# Patient Record
Sex: FEMALE | Race: OTHER | Employment: FULL TIME | URBAN - METROPOLITAN AREA
[De-identification: names, ages, dates, MRNs, and addresses within clinical notes are randomized per-mention and may not be internally consistent; named-entity substitution may affect disease eponyms.]

---

## 2017-07-11 ENCOUNTER — OFFICE VISIT (OUTPATIENT)
Dept: FAMILY MEDICINE CLINIC | Age: 37
End: 2017-07-11

## 2017-07-11 VITALS
DIASTOLIC BLOOD PRESSURE: 70 MMHG | TEMPERATURE: 98.3 F | BODY MASS INDEX: 35.33 KG/M2 | HEIGHT: 62 IN | SYSTOLIC BLOOD PRESSURE: 108 MMHG | WEIGHT: 192 LBS | HEART RATE: 76 BPM | RESPIRATION RATE: 18 BRPM

## 2017-07-11 DIAGNOSIS — Z86.69 HISTORY OF MIGRAINE HEADACHES: ICD-10-CM

## 2017-07-11 DIAGNOSIS — Z13.9 SCREENING: ICD-10-CM

## 2017-07-11 DIAGNOSIS — E03.9 HYPOTHYROIDISM, UNSPECIFIED TYPE: Primary | ICD-10-CM

## 2017-07-11 PROCEDURE — 99203 OFFICE O/P NEW LOW 30 MIN: CPT | Performed by: NURSE PRACTITIONER

## 2017-07-11 RX ORDER — LEVOTHYROXINE SODIUM 0.12 MG/1
125 TABLET ORAL DAILY
COMMUNITY
End: 2017-07-11 | Stop reason: SDUPTHER

## 2017-07-11 RX ORDER — LEVOTHYROXINE SODIUM 0.12 MG/1
125 TABLET ORAL DAILY
Qty: 90 TABLET | Refills: 3 | Status: SHIPPED | OUTPATIENT
Start: 2017-07-11 | End: 2018-02-05 | Stop reason: SDUPTHER

## 2017-07-11 RX ORDER — VERAPAMIL HYDROCHLORIDE 120 MG/1
120 CAPSULE, EXTENDED RELEASE ORAL NIGHTLY
COMMUNITY
End: 2019-02-18 | Stop reason: SDUPTHER

## 2017-07-11 RX ORDER — SERTRALINE HYDROCHLORIDE 100 MG/1
100 TABLET, FILM COATED ORAL DAILY
COMMUNITY
End: 2017-10-23 | Stop reason: SDUPTHER

## 2017-07-11 ASSESSMENT — PATIENT HEALTH QUESTIONNAIRE - PHQ9
SUM OF ALL RESPONSES TO PHQ QUESTIONS 1-9: 0
2. FEELING DOWN, DEPRESSED OR HOPELESS: 0
1. LITTLE INTEREST OR PLEASURE IN DOING THINGS: 0
SUM OF ALL RESPONSES TO PHQ9 QUESTIONS 1 & 2: 0

## 2017-09-02 LAB
ALBUMIN SERPL-MCNC: 4 G/DL (ref 3.2–5.3)
ALK PHOSPHATASE: 24 IU/L (ref 35–121)
ALT SERPL-CCNC: 12 IU/L (ref 5–59)
ANION GAP SERPL CALCULATED.3IONS-SCNC: 17 MMOL/L
AST SERPL-CCNC: 20 IU/L (ref 10–42)
BILIRUB SERPL-MCNC: 0.4 MG/DL (ref 0.2–1.3)
BUN BLDV-MCNC: 13 MG/DL (ref 10–20)
CALCIUM SERPL-MCNC: 9.1 MG/DL (ref 8.7–10.8)
CHLORIDE BLD-SCNC: 101 MMOL/L (ref 95–111)
CHOLESTEROL/HDL RATIO: 3.6
CHOLESTEROL: 178 MG/DL
CO2: 27 MMOL/L (ref 21–32)
CREAT SERPL-MCNC: 0.8 MG/DL (ref 0.5–1.3)
EGFR AFRICAN AMERICAN: 98
EGFR IF NONAFRICAN AMERICAN: 81
GLUCOSE: 77 MG/DL (ref 70–100)
HDLC SERPL-MCNC: 50 MG/DL (ref 40–60)
LDL CHOLESTEROL CALCULATED: 84 MG/DL
LDL/HDL RATIO: 1.7
POTASSIUM SERPL-SCNC: 4 MMOL/L (ref 3.5–5.4)
SODIUM BLD-SCNC: 141 MMOL/L (ref 134–147)
T4 FREE: 1.07 NG/DL (ref 0.8–1.8)
TOTAL PROTEIN: 7 G/DL (ref 5.8–8)
TRIGL SERPL-MCNC: 221 MG/DL
TSH SERPL DL<=0.05 MIU/L-ACNC: 1.07 UIU/ML (ref 0.4–4.4)
VLDLC SERPL CALC-MCNC: 44 MG/DL

## 2017-09-06 LAB — THYROID PEROXIDASE ANTIBODY: 1 IU/ML

## 2017-09-07 ENCOUNTER — TELEPHONE (OUTPATIENT)
Dept: FAMILY MEDICINE CLINIC | Age: 37
End: 2017-09-07

## 2017-10-23 ENCOUNTER — HOSPITAL ENCOUNTER (OUTPATIENT)
Dept: GENERAL RADIOLOGY | Age: 37
Discharge: HOME | End: 2017-10-23
Payer: COMMERCIAL

## 2017-10-23 ENCOUNTER — HOSPITAL ENCOUNTER (OUTPATIENT)
Age: 37
Discharge: HOME | End: 2017-10-23
Payer: COMMERCIAL

## 2017-10-23 ENCOUNTER — OFFICE VISIT (OUTPATIENT)
Dept: FAMILY MEDICINE CLINIC | Age: 37
End: 2017-10-23
Payer: COMMERCIAL

## 2017-10-23 VITALS
TEMPERATURE: 99.1 F | HEART RATE: 80 BPM | RESPIRATION RATE: 12 BRPM | SYSTOLIC BLOOD PRESSURE: 96 MMHG | DIASTOLIC BLOOD PRESSURE: 64 MMHG

## 2017-10-23 DIAGNOSIS — E66.9 OBESITY, UNSPECIFIED CLASSIFICATION, UNSPECIFIED OBESITY TYPE, UNSPECIFIED WHETHER SERIOUS COMORBIDITY PRESENT: ICD-10-CM

## 2017-10-23 DIAGNOSIS — F32.A DEPRESSION, UNSPECIFIED DEPRESSION TYPE: ICD-10-CM

## 2017-10-23 DIAGNOSIS — M79.672 ACUTE FOOT PAIN, LEFT: Primary | ICD-10-CM

## 2017-10-23 DIAGNOSIS — M79.672 ACUTE FOOT PAIN, LEFT: ICD-10-CM

## 2017-10-23 DIAGNOSIS — M25.572 ACUTE LEFT ANKLE PAIN: ICD-10-CM

## 2017-10-23 PROCEDURE — G8427 DOCREV CUR MEDS BY ELIG CLIN: HCPCS | Performed by: NURSE PRACTITIONER

## 2017-10-23 PROCEDURE — 73620 X-RAY EXAM OF FOOT: CPT

## 2017-10-23 PROCEDURE — 96372 THER/PROPH/DIAG INJ SC/IM: CPT | Performed by: NURSE PRACTITIONER

## 2017-10-23 PROCEDURE — 1036F TOBACCO NON-USER: CPT | Performed by: NURSE PRACTITIONER

## 2017-10-23 PROCEDURE — G8417 CALC BMI ABV UP PARAM F/U: HCPCS | Performed by: NURSE PRACTITIONER

## 2017-10-23 PROCEDURE — 99213 OFFICE O/P EST LOW 20 MIN: CPT | Performed by: NURSE PRACTITIONER

## 2017-10-23 PROCEDURE — 73600 X-RAY EXAM OF ANKLE: CPT

## 2017-10-23 PROCEDURE — G8484 FLU IMMUNIZE NO ADMIN: HCPCS | Performed by: NURSE PRACTITIONER

## 2017-10-23 RX ORDER — PREDNISONE 1 MG/1
TABLET ORAL
Qty: 30 TABLET | Refills: 0 | Status: SHIPPED | OUTPATIENT
Start: 2017-10-23 | End: 2017-11-02

## 2017-10-23 RX ORDER — SERTRALINE HYDROCHLORIDE 100 MG/1
100 TABLET, FILM COATED ORAL DAILY
Qty: 30 TABLET | Refills: 3 | Status: SHIPPED | OUTPATIENT
Start: 2017-10-23 | End: 2017-12-29 | Stop reason: SDUPTHER

## 2017-10-23 RX ORDER — METHYLPREDNISOLONE ACETATE 80 MG/ML
120 INJECTION, SUSPENSION INTRA-ARTICULAR; INTRALESIONAL; INTRAMUSCULAR; SOFT TISSUE ONCE
Status: COMPLETED | OUTPATIENT
Start: 2017-10-23 | End: 2017-10-23

## 2017-10-23 RX ORDER — KETOROLAC TROMETHAMINE 10 MG/1
10 TABLET, FILM COATED ORAL EVERY 6 HOURS PRN
Qty: 20 TABLET | Refills: 0 | Status: SHIPPED | OUTPATIENT
Start: 2017-10-23 | End: 2017-12-29

## 2017-10-23 RX ADMIN — METHYLPREDNISOLONE ACETATE 120 MG: 80 INJECTION, SUSPENSION INTRA-ARTICULAR; INTRALESIONAL; INTRAMUSCULAR; SOFT TISSUE at 14:56

## 2017-10-23 NOTE — LETTER
[facility]  [address] [place] [address] Rd[address] [address]  Phone: [phone]  Fax: [phone]    Tabatha Sifuentes NP        October 23, 2017     Patient: Neeru Kimball   YOB: 1980   Date of Visit: 10/23/2017       To Whom it May Concern:    Kristin Cummings Lou was seen in my clinic on 10/23/2017. She may return to work on 10/26/17. If you have any questions or concerns, please don't hesitate to call.     Sincerely,         Tabatha Sifuentes NP

## 2017-10-24 ENCOUNTER — TELEPHONE (OUTPATIENT)
Dept: FAMILY MEDICINE CLINIC | Age: 37
End: 2017-10-24

## 2017-10-24 DIAGNOSIS — M79.672 FOOT PAIN, LEFT: Primary | ICD-10-CM

## 2017-10-24 DIAGNOSIS — M77.50 BONE SPUR OF FOOT: ICD-10-CM

## 2017-10-24 DIAGNOSIS — T14.8XXA AVULSION FRACTURE: ICD-10-CM

## 2017-11-02 NOTE — PROGRESS NOTES
vitals reviewed. BP 96/64   Pulse 80   Temp 99.1 °F (37.3 °C) (Oral)   Resp 12       Impression/Plan:  1. Acute foot pain, left    2. Acute left ankle pain    3. Obesity, unspecified classification, unspecified obesity type, unspecified whether serious comorbidity present    4. Depression, unspecified depression type      Requested Prescriptions     Signed Prescriptions Disp Refills    naltrexone-bupropion (CONTRAVE) 8-90 MG per extended release tablet 120 tablet 2     Sig: Take 2 tablets by mouth 2 times daily    sertraline (ZOLOFT) 100 MG tablet 30 tablet 3     Sig: Take 1 tablet by mouth daily    predniSONE (DELTASONE) 5 MG tablet 30 tablet 0     Si po qd for 3 days, then 3 po qd for 3 days, then 2 po qd for 3 days, then 1 po qd for 3 days    ketorolac (TORADOL) 10 MG tablet 20 tablet 0     Sig: Take 1 tablet by mouth every 6 hours as needed for Pain    diclofenac sodium (VOLTAREN) 1 % GEL 5 Tube 0     Sig: Apply 4 g topically 4 times daily     Orders Placed This Encounter   Procedures    XR FOOT LEFT (2 VIEWS)     Standing Status:   Future     Number of Occurrences:   1     Standing Expiration Date:   10/23/2018    XR ANKLE LEFT (2 VIEWS)     Standing Status:   Future     Number of Occurrences:   1     Standing Expiration Date:   10/23/2018       Patient given educational materials - see patient instructions. Discussed use, benefit, and side effects of prescribed medications. All patient questions answered. Pt voiced understanding. Reviewed health maintenance. Patient agreed with treatment plan. Follow up as directed. weight loss info given to patient.     Electronically signed by Dante Butcher NP on 2017 at 1:17 PM

## 2017-12-29 ENCOUNTER — OFFICE VISIT (OUTPATIENT)
Dept: FAMILY MEDICINE CLINIC | Age: 37
End: 2017-12-29
Payer: COMMERCIAL

## 2017-12-29 VITALS
TEMPERATURE: 98.2 F | RESPIRATION RATE: 16 BRPM | HEART RATE: 84 BPM | HEIGHT: 62 IN | BODY MASS INDEX: 37.91 KG/M2 | DIASTOLIC BLOOD PRESSURE: 70 MMHG | WEIGHT: 206 LBS | SYSTOLIC BLOOD PRESSURE: 96 MMHG

## 2017-12-29 DIAGNOSIS — F43.23 SITUATIONAL MIXED ANXIETY AND DEPRESSIVE DISORDER: Primary | ICD-10-CM

## 2017-12-29 PROCEDURE — 99213 OFFICE O/P EST LOW 20 MIN: CPT | Performed by: NURSE PRACTITIONER

## 2017-12-29 PROCEDURE — G8484 FLU IMMUNIZE NO ADMIN: HCPCS | Performed by: NURSE PRACTITIONER

## 2017-12-29 PROCEDURE — 1036F TOBACCO NON-USER: CPT | Performed by: NURSE PRACTITIONER

## 2017-12-29 PROCEDURE — G8427 DOCREV CUR MEDS BY ELIG CLIN: HCPCS | Performed by: NURSE PRACTITIONER

## 2017-12-29 PROCEDURE — G8417 CALC BMI ABV UP PARAM F/U: HCPCS | Performed by: NURSE PRACTITIONER

## 2017-12-29 RX ORDER — HYDROXYZINE HYDROCHLORIDE 25 MG/1
25 TABLET, FILM COATED ORAL EVERY 8 HOURS PRN
Qty: 60 TABLET | Refills: 1 | Status: SHIPPED | OUTPATIENT
Start: 2017-12-29 | End: 2018-12-29

## 2017-12-29 RX ORDER — SERTRALINE HYDROCHLORIDE 100 MG/1
100 TABLET, FILM COATED ORAL DAILY
Qty: 30 TABLET | Refills: 3 | Status: SHIPPED | OUTPATIENT
Start: 2017-12-29 | End: 2018-04-10 | Stop reason: SDUPTHER

## 2017-12-29 RX ORDER — BUPROPION HYDROCHLORIDE 150 MG/1
150 TABLET ORAL EVERY MORNING
Qty: 30 TABLET | Refills: 3 | Status: SHIPPED | OUTPATIENT
Start: 2017-12-29 | End: 2018-01-30

## 2017-12-29 RX ORDER — BUSPIRONE HYDROCHLORIDE 5 MG/1
5 TABLET ORAL 3 TIMES DAILY
Qty: 60 TABLET | Refills: 1 | Status: SHIPPED | OUTPATIENT
Start: 2017-12-29 | End: 2018-01-30 | Stop reason: SDUPTHER

## 2018-01-03 ENCOUNTER — TELEPHONE (OUTPATIENT)
Dept: FAMILY MEDICINE CLINIC | Age: 38
End: 2018-01-03

## 2018-01-03 DIAGNOSIS — F43.23 ADJUSTMENT DISORDER WITH MIXED ANXIETY AND DEPRESSED MOOD: Primary | ICD-10-CM

## 2018-01-03 NOTE — TELEPHONE ENCOUNTER
1/3/18  Patient requesting to see Willis Bahena, no referral found. Please advise.   Balbir/dominique  Dolv: 12/29/17

## 2018-01-05 ASSESSMENT — ENCOUNTER SYMPTOMS
GASTROINTESTINAL NEGATIVE: 1
ALLERGIC/IMMUNOLOGIC NEGATIVE: 1
EYES NEGATIVE: 1
RESPIRATORY NEGATIVE: 1

## 2018-01-15 ENCOUNTER — OFFICE VISIT (OUTPATIENT)
Dept: BEHAVIORAL/MENTAL HEALTH CLINIC | Age: 38
End: 2018-01-15
Payer: COMMERCIAL

## 2018-01-15 DIAGNOSIS — F33.1 MAJOR DEPRESSIVE DISORDER, RECURRENT EPISODE, MODERATE WITH ANXIOUS DISTRESS (HCC): Primary | ICD-10-CM

## 2018-01-15 PROCEDURE — 90791 PSYCH DIAGNOSTIC EVALUATION: CPT | Performed by: PSYCHOLOGIST

## 2018-01-15 ASSESSMENT — PATIENT HEALTH QUESTIONNAIRE - PHQ9
10. IF YOU CHECKED OFF ANY PROBLEMS, HOW DIFFICULT HAVE THESE PROBLEMS MADE IT FOR YOU TO DO YOUR WORK, TAKE CARE OF THINGS AT HOME, OR GET ALONG WITH OTHER PEOPLE: 2
6. FEELING BAD ABOUT YOURSELF - OR THAT YOU ARE A FAILURE OR HAVE LET YOURSELF OR YOUR FAMILY DOWN: 1
5. POOR APPETITE OR OVEREATING: 0
2. FEELING DOWN, DEPRESSED OR HOPELESS: 3
3. TROUBLE FALLING OR STAYING ASLEEP: 3
7. TROUBLE CONCENTRATING ON THINGS, SUCH AS READING THE NEWSPAPER OR WATCHING TELEVISION: 1
9. THOUGHTS THAT YOU WOULD BE BETTER OFF DEAD, OR OF HURTING YOURSELF: 0
SUM OF ALL RESPONSES TO PHQ9 QUESTIONS 1 & 2: 6
SUM OF ALL RESPONSES TO PHQ QUESTIONS 1-9: 14
1. LITTLE INTEREST OR PLEASURE IN DOING THINGS: 3
8. MOVING OR SPEAKING SO SLOWLY THAT OTHER PEOPLE COULD HAVE NOTICED. OR THE OPPOSITE, BEING SO FIGETY OR RESTLESS THAT YOU HAVE BEEN MOVING AROUND A LOT MORE THAN USUAL: 0
4. FEELING TIRED OR HAVING LITTLE ENERGY: 3

## 2018-01-15 NOTE — PROGRESS NOTES
Maternal Grandmother     Allergy (Severe) Maternal Grandfather     Arthritis Maternal Grandfather     Arrhythmia Maternal Grandfather     Asthma Maternal Grandfather     Coronary Art Dis Maternal Grandfather     Depression Maternal Grandfather     Diabetes Maternal Grandfather     Hearing Loss Maternal Grandfather     High Blood Pressure Maternal Grandfather            A:    Administered PHQ-9 and EMIR-7 (see below). Patient endorses moderate symptoms of depression and mild symptoms of anxiety. EMIR can be ruled out, but pt's history of MDE combined with current presentation indicate criteria for MDD, recurrent, moderate with anxious distress are met. PHQ Scores 1/15/2018 7/11/2017   PHQ2 Score 6 0   PHQ9 Score 14 0     Interpretation of Total Score Depression Severity: 1-4 = Minimal depression, 5-9 = Mild depression, 10-14 = Moderate depression, 15-19 = Moderately severe depression, 20-27 = Severe depression    EMIR-7    Over the last 2 weeks, how often have you been bothered by the following problems? 1. Feeling nervous, anxious, or on edge   [  ] Not at all (0)  [  ] Several days (1)  [ X ] Over half the days (2)  [  ] Nearly every day (3)    2. Not being able to stop or control worrying    [ X ] Not at all (0)  [  ] Several days (1)  [  ] Over half the days (2)  [  ] Nearly every day (3)    3. Worrying too much about different things    [  ] Not at all (0)  [ X ] Several days (1)  [  ] Over half the days (2)  [  ] Nearly every day (3)    4. Trouble relaxing    [  ] Not at all (0)  [  ] Several days (1)  [ X ] Over half the days (2)  [  ] Nearly every day (3)    5. Being so restless that its hard to sit still    [  ] Not at all (0)  [ X ] Several days (1)  [  ] Over half the days (2)  [  ] Nearly every day (3)    6. Becoming easily annoyed or irritable    [  ] Not at all (0)  [  ] Several days (1)  [  ] Over half the days (2)  [ X ] Nearly every day (3)    7.  Feeling afraid as if something awful forward to    5. Call your local [employer] (and maybe even the Crossfit class in [facility]) and ask about group classes, cost, etc.      6.  Complete the vital needs list and bring back next time. 7.  Return to see Dr. Kristin Aguirre in 2 weeks.

## 2018-01-15 NOTE — PATIENT INSTRUCTIONS
may help decrease depression), recognizing and changing thought patterns that contribute to depression or worry (thoughts), or learning and trying out new behaviors to improve work or family situations (behaviors, e.g., problem-solving, effective communication, time management, etc.). As you can see, there are a variety of coping methods and behavioral strategies that may be helpful in decreasing depression. It is probably not in your best interests to try all or too many strategies at any one time. Rather, keep it simple and do not overwhelm yourself. It is usually best to pick one or two strategies that sound most relevant to you, try those coping strategies for a few weeks or longer, and then move on to other coping strategies that you think may be important later on. And remember -- even if you are just working directly on one or two coping strategies, you will probably be having an indirect positive effect on other areas. Your Behavioral Health Consultant [facility]) can work with you to develop skills in some of the most effective strategies for breaking the depression spiral and decreasing depression. Four effective strategies include:    __X___  a. Increasing rewarding activities    __X___  b. Taking antidepressant medication as directed by PCP     __X___  c. Increasing physical exercise     __X___  d. Increasing balanced thinking     Talk with your [employer] [facility] about which of the above you are interested in working on to better manage your depression.

## 2018-01-29 ENCOUNTER — OFFICE VISIT (OUTPATIENT)
Dept: BEHAVIORAL/MENTAL HEALTH CLINIC | Age: 38
End: 2018-01-29
Payer: COMMERCIAL

## 2018-01-29 DIAGNOSIS — F33.1 MAJOR DEPRESSIVE DISORDER, RECURRENT EPISODE, MODERATE WITH ANXIOUS DISTRESS (HCC): Primary | ICD-10-CM

## 2018-01-29 PROCEDURE — 90834 PSYTX W PT 45 MINUTES: CPT | Performed by: PSYCHOLOGIST

## 2018-01-29 NOTE — PROGRESS NOTES
Behavioral Health Consultation  Concetta Jhaveri PsyD  Psychologist  1/29/2018  3:08 PM      Time spent with Patient:  45 minutes  This is patient's second  [facility] appointment. Reason for Consult:  depression, anxiety and stress  Referring Provider: HAZEL Molina Ady  [place], [address]    Feedback given to PCP. S:  Pt identified the following vital needs:  Approval and acceptance, financial security, having a project, learning something new, need to give and do for others, personal time, and variety of experiences. Pt said the only one of these that's being met was need to give and do for others. 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 She said she has a hard time delegating tasks to others when she would rather do them herself to make sure they are done right. Pt said she read a book the past couple weeks ago, Present Over Perfect, and it was very good. Pt said she went to a comedy show this past couple weeks and had a good time. Pt said she's been taking the psychotropic medications regularly. She wanted to work on ways to learn something new, have a better variety of work experiences, and have more personal time for herself.       O:  MSE:    Appearance    alert, cooperative, moderate distress  Appetite normal  Sleep disturbance Yes  Fatigue Yes  Loss of pleasure Yes  Impulsive behavior No  Speech    spontaneous, normal rate, normal volume and well articulated  Mood    Depressed  Affect    depressed affect  Thought Content    intact and helplessness  Thought Process    linear, goal directed and coherent  Associations    logical connections  Insight    Fair  Judgment    Intact  Orientation    oriented to person, place, time, and general circumstances  Memory    recent and remote memory intact  Attention/Concentration    intact  Morbid ideation No  Suicide Assessment    no suicidal ideation    History:    Medications:   Current Outpatient Prescriptions   Medication Sig Dispense Refill    buPROPion (WELLBUTRIN XL) 150 MG extended release tablet Take 1 tablet by mouth every morning 30 tablet 3    busPIRone (BUSPAR) 5 MG tablet Take 1 tablet by mouth 3 times daily 60 tablet 1    hydrOXYzine (ATARAX) 25 MG tablet Take 1 tablet by mouth every 8 hours as needed for Anxiety (sleep) 60 tablet 1    sertraline (ZOLOFT) 100 MG tablet Take 1 tablet by mouth daily 30 tablet 3    sertraline (ZOLOFT) 50 MG tablet Take 1 tablet by mouth daily 30 tablet 3    diclofenac sodium (VOLTAREN) 1 % GEL Apply 4 g topically 4 times daily 5 Tube 0    verapamil (VERELAN) 120 MG extended release capsule Take 120 mg by mouth nightly      levothyroxine (SYNTHROID) 125 MCG tablet Take 1 tablet by mouth Daily 90 tablet 3     No current facility-administered medications for this visit. Social History:   Social History     Social History    Marital status: Single     Spouse name: N/A    Number of children: N/A    Years of education: N/A     Occupational History    Not on file. Social History Main Topics    Smoking status: Never Smoker    Smokeless tobacco: Never Used    Alcohol use No    Drug use: No    Sexual activity: Yes     Partners: Male     Other Topics Concern    Not on file     Social History Narrative    No narrative on file       TOBACCO:   reports that she has never smoked. She has never used smokeless tobacco.  ETOH:   reports that she does not drink alcohol.     Family History:   Family History   Problem Relation Age of Onset    Anemia Sister     Asthma Sister     Arthritis Maternal Grandmother     Cancer Maternal Grandmother     Hearing Loss Maternal Grandmother     Miscarriages / Stillbirths Maternal Grandmother     Allergy (Severe) Maternal Grandfather     Arthritis Maternal Grandfather     Arrhythmia Maternal Grandfather     Asthma Maternal Grandfather     Coronary Art Dis

## 2018-01-30 ENCOUNTER — OFFICE VISIT (OUTPATIENT)
Dept: FAMILY MEDICINE CLINIC | Age: 38
End: 2018-01-30
Payer: COMMERCIAL

## 2018-01-30 VITALS
DIASTOLIC BLOOD PRESSURE: 64 MMHG | RESPIRATION RATE: 12 BRPM | SYSTOLIC BLOOD PRESSURE: 98 MMHG | WEIGHT: 205 LBS | TEMPERATURE: 98.3 F | HEIGHT: 63 IN | BODY MASS INDEX: 36.32 KG/M2 | HEART RATE: 72 BPM

## 2018-01-30 DIAGNOSIS — F41.8 SITUATIONAL ANXIETY: ICD-10-CM

## 2018-01-30 DIAGNOSIS — F33.9 EPISODE OF RECURRENT MAJOR DEPRESSIVE DISORDER, UNSPECIFIED DEPRESSION EPISODE SEVERITY (HCC): Primary | ICD-10-CM

## 2018-01-30 DIAGNOSIS — E66.09 OBESITY DUE TO EXCESS CALORIES, UNSPECIFIED CLASSIFICATION, UNSPECIFIED WHETHER SERIOUS COMORBIDITY PRESENT: ICD-10-CM

## 2018-01-30 PROCEDURE — 1036F TOBACCO NON-USER: CPT | Performed by: NURSE PRACTITIONER

## 2018-01-30 PROCEDURE — 99213 OFFICE O/P EST LOW 20 MIN: CPT | Performed by: NURSE PRACTITIONER

## 2018-01-30 PROCEDURE — G8484 FLU IMMUNIZE NO ADMIN: HCPCS | Performed by: NURSE PRACTITIONER

## 2018-01-30 PROCEDURE — G8417 CALC BMI ABV UP PARAM F/U: HCPCS | Performed by: NURSE PRACTITIONER

## 2018-01-30 PROCEDURE — G8427 DOCREV CUR MEDS BY ELIG CLIN: HCPCS | Performed by: NURSE PRACTITIONER

## 2018-01-30 RX ORDER — BUSPIRONE HYDROCHLORIDE 7.5 MG/1
7.5 TABLET ORAL 3 TIMES DAILY
Qty: 90 TABLET | Refills: 1 | Status: SHIPPED | OUTPATIENT
Start: 2018-01-30 | End: 2018-11-26 | Stop reason: SDUPTHER

## 2018-02-05 ENCOUNTER — PATIENT MESSAGE (OUTPATIENT)
Dept: FAMILY MEDICINE CLINIC | Age: 38
End: 2018-02-05

## 2018-02-05 RX ORDER — LEVOTHYROXINE SODIUM 0.12 MG/1
125 TABLET ORAL DAILY
Qty: 90 TABLET | Refills: 1 | Status: SHIPPED | OUTPATIENT
Start: 2018-02-05 | End: 2018-11-26 | Stop reason: SDUPTHER

## 2018-02-05 NOTE — TELEPHONE ENCOUNTER
From: Lee Ann Pedro  To: Aubrey Velasquez NP  Sent: 2/5/2018 2:01 PM EST  Subject: Prescription Question    Good afternoon! I was in the office last week. We discussed prescribing the Contrave as well as refilling the levothyroxine. My other medications were at the pharmacy but they stated they didn't receive scripts for these two. Could you help me with this? Thank you for your time!     Thanks,  [employer]

## 2018-02-06 ASSESSMENT — ENCOUNTER SYMPTOMS
GASTROINTESTINAL NEGATIVE: 1
RESPIRATORY NEGATIVE: 1
EYES NEGATIVE: 1

## 2018-02-08 ENCOUNTER — HOSPITAL ENCOUNTER (OUTPATIENT)
Dept: PHYSICAL THERAPY | Age: 38
Setting detail: THERAPIES SERIES
Discharge: HOME | End: 2018-02-08
Payer: COMMERCIAL

## 2018-02-20 ENCOUNTER — TELEPHONE (OUTPATIENT)
Dept: ADMINISTRATIVE | Age: 38
End: 2018-02-20

## 2018-03-01 ENCOUNTER — OFFICE VISIT (OUTPATIENT)
Dept: FAMILY MEDICINE CLINIC | Age: 38
End: 2018-03-01
Payer: COMMERCIAL

## 2018-03-01 VITALS
HEIGHT: 62 IN | SYSTOLIC BLOOD PRESSURE: 90 MMHG | BODY MASS INDEX: 38.64 KG/M2 | RESPIRATION RATE: 16 BRPM | DIASTOLIC BLOOD PRESSURE: 66 MMHG | HEART RATE: 68 BPM | WEIGHT: 210 LBS

## 2018-03-01 DIAGNOSIS — E66.9 OBESITY, UNSPECIFIED CLASSIFICATION, UNSPECIFIED OBESITY TYPE, UNSPECIFIED WHETHER SERIOUS COMORBIDITY PRESENT: ICD-10-CM

## 2018-03-01 DIAGNOSIS — F43.23 SITUATIONAL MIXED ANXIETY AND DEPRESSIVE DISORDER: Primary | ICD-10-CM

## 2018-03-01 PROCEDURE — G8427 DOCREV CUR MEDS BY ELIG CLIN: HCPCS | Performed by: NURSE PRACTITIONER

## 2018-03-01 PROCEDURE — 99213 OFFICE O/P EST LOW 20 MIN: CPT | Performed by: NURSE PRACTITIONER

## 2018-03-01 PROCEDURE — G8484 FLU IMMUNIZE NO ADMIN: HCPCS | Performed by: NURSE PRACTITIONER

## 2018-03-01 PROCEDURE — G8417 CALC BMI ABV UP PARAM F/U: HCPCS | Performed by: NURSE PRACTITIONER

## 2018-03-01 PROCEDURE — 1036F TOBACCO NON-USER: CPT | Performed by: NURSE PRACTITIONER

## 2018-03-02 ASSESSMENT — ENCOUNTER SYMPTOMS
RESPIRATORY NEGATIVE: 1
EYES NEGATIVE: 1
GASTROINTESTINAL NEGATIVE: 1

## 2018-04-10 ENCOUNTER — OFFICE VISIT (OUTPATIENT)
Dept: FAMILY MEDICINE CLINIC | Age: 38
End: 2018-04-10
Payer: COMMERCIAL

## 2018-04-10 VITALS
HEART RATE: 80 BPM | HEIGHT: 62 IN | DIASTOLIC BLOOD PRESSURE: 66 MMHG | BODY MASS INDEX: 38.09 KG/M2 | SYSTOLIC BLOOD PRESSURE: 100 MMHG | WEIGHT: 207 LBS | RESPIRATION RATE: 16 BRPM

## 2018-04-10 DIAGNOSIS — F17.200 SMOKER: ICD-10-CM

## 2018-04-10 DIAGNOSIS — F43.23 SITUATIONAL MIXED ANXIETY AND DEPRESSIVE DISORDER: Primary | ICD-10-CM

## 2018-04-10 DIAGNOSIS — Z13.31 POSITIVE DEPRESSION SCREENING: ICD-10-CM

## 2018-04-10 DIAGNOSIS — E66.9 OBESITY, UNSPECIFIED CLASSIFICATION, UNSPECIFIED OBESITY TYPE, UNSPECIFIED WHETHER SERIOUS COMORBIDITY PRESENT: ICD-10-CM

## 2018-04-10 PROCEDURE — G8417 CALC BMI ABV UP PARAM F/U: HCPCS | Performed by: NURSE PRACTITIONER

## 2018-04-10 PROCEDURE — G8431 POS CLIN DEPRES SCRN F/U DOC: HCPCS | Performed by: NURSE PRACTITIONER

## 2018-04-10 PROCEDURE — 1036F TOBACCO NON-USER: CPT | Performed by: NURSE PRACTITIONER

## 2018-04-10 PROCEDURE — G8427 DOCREV CUR MEDS BY ELIG CLIN: HCPCS | Performed by: NURSE PRACTITIONER

## 2018-04-10 PROCEDURE — 99213 OFFICE O/P EST LOW 20 MIN: CPT | Performed by: NURSE PRACTITIONER

## 2018-04-10 RX ORDER — SERTRALINE HYDROCHLORIDE 100 MG/1
100 TABLET, FILM COATED ORAL DAILY
Qty: 30 TABLET | Refills: 5 | Status: SHIPPED | OUTPATIENT
Start: 2018-04-10 | End: 2019-02-18

## 2018-04-11 ENCOUNTER — TELEPHONE (OUTPATIENT)
Dept: FAMILY MEDICINE CLINIC | Age: 38
End: 2018-04-11

## 2018-04-20 ASSESSMENT — ENCOUNTER SYMPTOMS
RESPIRATORY NEGATIVE: 1
EYES NEGATIVE: 1
GASTROINTESTINAL NEGATIVE: 1

## 2018-06-20 NOTE — TELEPHONE ENCOUNTER
I attempted to contact the patient to verify dosage. Phone kept ringing, no answer. Will attempt to contact later.

## 2018-07-02 RX ORDER — BUSPIRONE HYDROCHLORIDE 5 MG/1
TABLET ORAL
Qty: 60 TABLET | Refills: 1 | OUTPATIENT
Start: 2018-07-02

## 2018-07-20 ENCOUNTER — OFFICE VISIT (OUTPATIENT)
Dept: FAMILY MEDICINE CLINIC | Age: 38
End: 2018-07-20
Payer: COMMERCIAL

## 2018-07-20 VITALS
DIASTOLIC BLOOD PRESSURE: 76 MMHG | HEART RATE: 72 BPM | RESPIRATION RATE: 16 BRPM | BODY MASS INDEX: 39.61 KG/M2 | SYSTOLIC BLOOD PRESSURE: 124 MMHG | HEIGHT: 61 IN | WEIGHT: 209.8 LBS | TEMPERATURE: 97.9 F

## 2018-07-20 DIAGNOSIS — Z01.818 PREOP EXAMINATION: Primary | ICD-10-CM

## 2018-07-20 PROCEDURE — G8427 DOCREV CUR MEDS BY ELIG CLIN: HCPCS | Performed by: NURSE PRACTITIONER

## 2018-07-20 PROCEDURE — 99242 OFF/OP CONSLTJ NEW/EST SF 20: CPT | Performed by: NURSE PRACTITIONER

## 2018-07-20 PROCEDURE — G8417 CALC BMI ABV UP PARAM F/U: HCPCS | Performed by: NURSE PRACTITIONER

## 2018-07-20 NOTE — PROGRESS NOTES
1 tablet by mouth Daily 90 tablet 1    busPIRone (BUSPAR) 7.5 MG tablet Take 1 tablet by mouth 3 times daily 90 tablet 1    hydrOXYzine (ATARAX) 25 MG tablet Take 1 tablet by mouth every 8 hours as needed for Anxiety (sleep) 60 tablet 1    verapamil (VERELAN) 120 MG extended release capsule Take 120 mg by mouth nightly       No current facility-administered medications for this visit. No Known Allergies  Health Maintenance   Topic Date Due    HIV screen  05/25/1995    DTaP/Tdap/Td vaccine (1 - Tdap) 05/25/1999    Cervical cancer screen  05/25/2001    Flu vaccine (1) 09/01/2018         Objective:     Physical Exam   Constitutional: She is oriented to person, place, and time. She appears well-developed and well-nourished. HENT:   Head: Normocephalic and atraumatic. Right Ear: External ear normal.   Left Ear: External ear normal.   Nose: Nose normal.   Mouth/Throat: Oropharynx is clear and moist. She does not have dentures. Normal dentition. No dental caries. Eyes: Conjunctivae and EOM are normal. Pupils are equal, round, and reactive to light. No scleral icterus. Neck: Normal range of motion. Neck supple. No JVD present. No thyromegaly present. Cardiovascular: Normal rate, regular rhythm, normal heart sounds and intact distal pulses. Pulmonary/Chest: Effort normal and breath sounds normal.   Abdominal: Soft. Bowel sounds are normal.   Musculoskeletal: Normal range of motion. Lymphadenopathy:     She has no cervical adenopathy. Neurological: She is alert and oriented to person, place, and time. Skin: Skin is warm and dry. Psychiatric: She has a normal mood and affect. Nursing note and vitals reviewed.     Component      Latest Ref Rng & Units 7/13/2018   WBC      4.4 - 10.5 th/cmm 6.9   RBC      4.00 - 5.10 mil/cmm 4.11   Hemoglobin Quant      12.0 - 15.0 gm/dL 13.1   Hematocrit      35.0 - 44.0 % 38.6   MCV      80 - 97 CU BROOKE 94.0   MCH      27.5 - 33.0 PG 31.9   MCHC      33.0 - 36.0 gm/dL 33.9   RDW      12.0 - 16.0 % 13.5   Platelet Count      469 - 400 th/cmm 265   Neutrophils %      40 - 70 % 68.4   Lymphocyte %      15 - 45 % 20.9   Monocytes %      2 - 10 % 7.2   Eosinophils %      0 - 6 % 2.9   Basophils %      0 - 2 % 0.6   Nucleated Red Blood Cells      <1 /100 WBC 0.0   Absolute Neut #      1800 - 7700 /cmm 4700   Absolute Lymph #      1000 - 4800 /cmm 1400   Absolute Mono #      0 - 800 /cmm 500   Absolute Eos #      0 - 500 /cmm 200   Basophils #      0 - 200 /cmm 0   Sodium      135 - 145 mEq/L 136   Potassium      3.6 - 5.0 mEq/L 3.7   Chloride      101 - 111 mEq/L 104   CO2      21 - 32 mEq/L 28   Anion Gap      4 - 12 4   Glucose      70 - 110 mg/dL 92   BUN      7 - 20 mg/dL 14   Creatinine      0.60 - 1.30 mg/dL 0.84   Creatinine Clearance       >60   AST      15 - 41 IU/L 26   Alk Phos      39 - 118 IU/L 20 (L)   Bilirubin      0.2 - 1.0 mg/dL 0.4   Calcium      8.8 - 10.5 mg/dL 9.2   Albumin      3.5 - 5.0 gm/dL 4.4   Total Protein      6.2 - 8.0 g/dL 7.7   Albumin/Globulin Ratio      1.5 - 2.5 1.3 (L)   ALT      10 - 40 IU/L 24   Color, UA       Yellow   Appearance       Clear   Glucose, UA      Negative Negative   Bilirubin, Urine      Negative Negative   Ketones, Urine      Negative Negative   Specific Gravity, Urine      1.000 - 1.03 1.018   Urine Hgb      Negative Trace (H)   pH, Urine      5.0 - 8.0 5.0   Protein, Urine      Negative Negative   Urobilinogen, Urine      0.2 - 1.0 <2.0 E.U./dL   Nitrite, Urine      Negative Negative   LEUKOCYTES, UA      Negative Negative   WBC, UA      /HPF 0-5   RBC, UA      /HPF 3-5 (H)   Squam Epithel, UA      /HPF 0-5   Mucus, UA       Present   URINALYSIS REFLEX       No   Prothrombin Time      9.6 - 13.3 Sec 12.1   INR      0.9 - 1.2 1.05   aPTT      23.0 - 38.0 Sec 30.3        Ordering Provider: [facility]          Radiology Department  Patient: [facility]     [address]   :  1980   Sex:  LUBNA Alcocer, [address] [facility]  Location:  [place] [address]   Unit #:   [id number]       Acct #: [de-identified]       Ordering Phys: Dali Rodrigues MD            Exam Date: 07/13/18     Accession #:  [id number]     Exam:  MAIN   XR Chest (PA ^ LAT) Min 2 View     Result:           STUDY:   X-RAY CHEST          REASON FOR EXAM:   Female, 45years old. Neville Prince for carpal tunnel syndrome     surgery          TECHNIQUE:   PA and lateral views of the chest.          COMPARISON:   None.     ___________________________________          FINDINGS:          The lungs are clear and expanded.  There is no demonstrated pleural     abnormality.          Normal size heart.   Normal mediastinum and ju.  Normal visualized     pulmonary arteries.  Normal visualized aortic arch and descending thoracic     aorta.          Normal visualized thoracic spine.  Normal visualized ribs, clavicles, and     shoulders.          There is no demonstrated abnormality of the visualized soft tissue     structures of the upper abdomen.     ___________________________________          IMPRESSION:     Normal x-ray examination of the chest.          Electronically Signed:     Linus Jimenez MD     2018/07/13 at 9:29 EDT     Tel [phone], Service support  [phone], Fax [phone]                         cc: Jessi Griffin FN-C;           /76 (Site: Left Arm, Position: Sitting, Cuff Size: Medium Adult)   Pulse 72   Temp 97.9 °F (36.6 °C) (Oral)   Resp 16   Ht 5' 1.5\" (1.562 m)   Wt 209 lb 12.8 oz (95.2 kg)   BMI 39.00 kg/m²       Impression/Plan:  1. Preop examination      Requested Prescriptions      No prescriptions requested or ordered in this encounter     No orders of the defined types were placed in this encounter. Patient given educational materials - see patient instructions. Discussed use, benefit, and side effects of prescribed medications. All patient questions answered. Pt voiced understanding.  Reviewed

## 2018-10-16 ENCOUNTER — OFFICE VISIT (OUTPATIENT)
Dept: FAMILY MEDICINE CLINIC | Age: 38
End: 2018-10-16
Payer: COMMERCIAL

## 2018-10-16 VITALS
HEART RATE: 84 BPM | TEMPERATURE: 98.6 F | BODY MASS INDEX: 39.56 KG/M2 | DIASTOLIC BLOOD PRESSURE: 78 MMHG | HEIGHT: 62 IN | SYSTOLIC BLOOD PRESSURE: 122 MMHG | RESPIRATION RATE: 18 BRPM | WEIGHT: 215 LBS

## 2018-10-16 DIAGNOSIS — H65.91 RIGHT OTITIS MEDIA WITH EFFUSION: ICD-10-CM

## 2018-10-16 DIAGNOSIS — J02.9 ACUTE PHARYNGITIS, UNSPECIFIED ETIOLOGY: Primary | ICD-10-CM

## 2018-10-16 PROCEDURE — G8484 FLU IMMUNIZE NO ADMIN: HCPCS | Performed by: NURSE PRACTITIONER

## 2018-10-16 PROCEDURE — G8427 DOCREV CUR MEDS BY ELIG CLIN: HCPCS | Performed by: NURSE PRACTITIONER

## 2018-10-16 PROCEDURE — 1036F TOBACCO NON-USER: CPT | Performed by: NURSE PRACTITIONER

## 2018-10-16 PROCEDURE — 99213 OFFICE O/P EST LOW 20 MIN: CPT | Performed by: NURSE PRACTITIONER

## 2018-10-16 PROCEDURE — G8417 CALC BMI ABV UP PARAM F/U: HCPCS | Performed by: NURSE PRACTITIONER

## 2018-10-16 RX ORDER — AZITHROMYCIN 250 MG/1
TABLET, FILM COATED ORAL
Qty: 1 PACKET | Refills: 0 | Status: SHIPPED | OUTPATIENT
Start: 2018-10-16 | End: 2018-10-20

## 2018-10-16 RX ORDER — CETIRIZINE HYDROCHLORIDE 10 MG/1
10 TABLET ORAL DAILY
Qty: 30 TABLET | Refills: 0 | Status: SHIPPED | OUTPATIENT
Start: 2018-10-16 | End: 2019-02-18 | Stop reason: SDUPTHER

## 2018-10-17 ASSESSMENT — ENCOUNTER SYMPTOMS
COUGH: 1
SORE THROAT: 1

## 2018-10-17 NOTE — PROGRESS NOTES
tablet Take 1 tablet by mouth daily 30 tablet 0    azithromycin (ZITHROMAX Z-ODILON) 250 MG tablet Take 2 tablets (500 mg) on Day 1, and then take 1 tablet (250 mg) on days 2 through 5. 1 packet 0    naltrexone-bupropion (CONTRAVE) 8-90 MG per extended release tablet Take 2 tablets by mouth 2 times daily 120 tablet 1    sertraline (ZOLOFT) 100 MG tablet Take 1 tablet by mouth daily 30 tablet 5    sertraline (ZOLOFT) 50 MG tablet Take 1 tablet by mouth daily 30 tablet 5    levothyroxine (SYNTHROID) 125 MCG tablet Take 1 tablet by mouth Daily 90 tablet 1    busPIRone (BUSPAR) 7.5 MG tablet Take 1 tablet by mouth 3 times daily 90 tablet 1    hydrOXYzine (ATARAX) 25 MG tablet Take 1 tablet by mouth every 8 hours as needed for Anxiety (sleep) 60 tablet 1    verapamil (VERELAN) 120 MG extended release capsule Take 120 mg by mouth nightly       No current facility-administered medications for this visit. No Known Allergies  Health Maintenance   Topic Date Due    HIV screen  05/25/1995    DTaP/Tdap/Td vaccine (1 - Tdap) 05/25/1999    Cervical cancer screen  05/25/2001    Flu vaccine (1) 09/01/2018         Objective:     Physical Exam   Constitutional: She appears well-developed and well-nourished. HENT:   Right Ear: Tympanic membrane is erythematous. A middle ear effusion is present. Mouth/Throat: Posterior oropharyngeal erythema present. No oropharyngeal exudate or tonsillar abscesses. Eyes: Conjunctivae and EOM are normal.   Neck: Normal range of motion. Cardiovascular: Normal rate, regular rhythm and normal heart sounds. Pulmonary/Chest: Effort normal and breath sounds normal.   Abdominal: Soft. Musculoskeletal: Normal range of motion. Neurological: She is alert. Skin: Skin is warm. Psychiatric: She has a normal mood and affect. Nursing note and vitals reviewed.     /78 (Site: Right Upper Arm, Position: Sitting)   Pulse 84   Temp 98.6 °F (37 °C) (Oral)   Resp 18   Ht 5' 1.5\"

## 2018-11-27 RX ORDER — LEVOTHYROXINE SODIUM 0.12 MG/1
125 TABLET ORAL DAILY
Qty: 90 TABLET | Refills: 1 | Status: SHIPPED | OUTPATIENT
Start: 2018-11-27 | End: 2019-02-18 | Stop reason: SDUPTHER

## 2018-11-27 RX ORDER — BUSPIRONE HYDROCHLORIDE 7.5 MG/1
7.5 TABLET ORAL 3 TIMES DAILY
Qty: 90 TABLET | Refills: 1 | Status: SHIPPED | OUTPATIENT
Start: 2018-11-27 | End: 2019-02-18 | Stop reason: SDUPTHER

## 2019-02-18 ENCOUNTER — OFFICE VISIT (OUTPATIENT)
Dept: FAMILY MEDICINE CLINIC | Age: 39
End: 2019-02-18
Payer: COMMERCIAL

## 2019-02-18 VITALS
BODY MASS INDEX: 40.96 KG/M2 | HEIGHT: 62 IN | TEMPERATURE: 98.6 F | DIASTOLIC BLOOD PRESSURE: 70 MMHG | SYSTOLIC BLOOD PRESSURE: 104 MMHG | WEIGHT: 222.6 LBS | OXYGEN SATURATION: 98 % | RESPIRATION RATE: 16 BRPM | HEART RATE: 79 BPM

## 2019-02-18 DIAGNOSIS — E66.9 OBESITY, UNSPECIFIED CLASSIFICATION, UNSPECIFIED OBESITY TYPE, UNSPECIFIED WHETHER SERIOUS COMORBIDITY PRESENT: ICD-10-CM

## 2019-02-18 DIAGNOSIS — E03.9 HYPOTHYROIDISM, UNSPECIFIED TYPE: ICD-10-CM

## 2019-02-18 DIAGNOSIS — R53.83 FATIGUE, UNSPECIFIED TYPE: ICD-10-CM

## 2019-02-18 DIAGNOSIS — R63.5 WEIGHT GAIN: Primary | ICD-10-CM

## 2019-02-18 DIAGNOSIS — F33.1 MODERATE EPISODE OF RECURRENT MAJOR DEPRESSIVE DISORDER (HCC): ICD-10-CM

## 2019-02-18 DIAGNOSIS — G47.9 SLEEP DISTURBANCE: ICD-10-CM

## 2019-02-18 DIAGNOSIS — F43.23 SITUATIONAL MIXED ANXIETY AND DEPRESSIVE DISORDER: ICD-10-CM

## 2019-02-18 DIAGNOSIS — E04.9 ENLARGED THYROID: ICD-10-CM

## 2019-02-18 LAB
ALBUMIN SERPL-MCNC: 4.5 G/DL (ref 3.5–5.1)
ALP BLD-CCNC: 27 U/L (ref 38–126)
ALT SERPL-CCNC: 13 U/L (ref 11–66)
ANION GAP SERPL CALCULATED.3IONS-SCNC: 15 MEQ/L (ref 8–16)
AST SERPL-CCNC: 19 U/L (ref 5–40)
BASOPHILS # BLD: 0.3 %
BASOPHILS ABSOLUTE: 0 THOU/MM3 (ref 0–0.1)
BILIRUB SERPL-MCNC: 0.3 MG/DL (ref 0.3–1.2)
BUN BLDV-MCNC: 16 MG/DL (ref 7–22)
CALCIUM SERPL-MCNC: 9.7 MG/DL (ref 8.5–10.5)
CHLORIDE BLD-SCNC: 102 MEQ/L (ref 98–111)
CHOLESTEROL, TOTAL: 201 MG/DL (ref 100–199)
CO2: 23 MEQ/L (ref 23–33)
CREAT SERPL-MCNC: 0.7 MG/DL (ref 0.4–1.2)
EOSINOPHIL # BLD: 3.7 %
EOSINOPHILS ABSOLUTE: 0.3 THOU/MM3 (ref 0–0.4)
ERYTHROCYTE [DISTWIDTH] IN BLOOD BY AUTOMATED COUNT: 13.2 % (ref 11.5–14.5)
ERYTHROCYTE [DISTWIDTH] IN BLOOD BY AUTOMATED COUNT: 45.5 FL (ref 35–45)
GFR SERPL CREATININE-BSD FRML MDRD: > 90 ML/MIN/1.73M2
GLUCOSE BLD-MCNC: 93 MG/DL (ref 70–108)
HCT VFR BLD CALC: 40.2 % (ref 37–47)
HDLC SERPL-MCNC: 50 MG/DL
HEMOGLOBIN: 13.2 GM/DL (ref 12–16)
IMMATURE GRANS (ABS): 0.02 THOU/MM3 (ref 0–0.07)
IMMATURE GRANULOCYTES: 0.3 %
LDL CHOLESTEROL CALCULATED: 121 MG/DL
LYMPHOCYTES # BLD: 22.5 %
LYMPHOCYTES ABSOLUTE: 1.5 THOU/MM3 (ref 1–4.8)
MCH RBC QN AUTO: 31.3 PG (ref 26–33)
MCHC RBC AUTO-ENTMCNC: 32.8 GM/DL (ref 32.2–35.5)
MCV RBC AUTO: 95.3 FL (ref 81–99)
MONOCYTES # BLD: 6.8 %
MONOCYTES ABSOLUTE: 0.5 THOU/MM3 (ref 0.4–1.3)
NUCLEATED RED BLOOD CELLS: 0 /100 WBC
PLATELET # BLD: 221 THOU/MM3 (ref 130–400)
PMV BLD AUTO: 10.8 FL (ref 9.4–12.4)
POTASSIUM SERPL-SCNC: 4.2 MEQ/L (ref 3.5–5.2)
RBC # BLD: 4.22 MILL/MM3 (ref 4.2–5.4)
SEG NEUTROPHILS: 66.4 %
SEGMENTED NEUTROPHILS ABSOLUTE COUNT: 4.5 THOU/MM3 (ref 1.8–7.7)
SODIUM BLD-SCNC: 140 MEQ/L (ref 135–145)
T4 FREE: 1.12 NG/DL (ref 0.93–1.76)
TOTAL PROTEIN: 7.7 G/DL (ref 6.1–8)
TRIGL SERPL-MCNC: 148 MG/DL (ref 0–199)
TSH SERPL DL<=0.05 MIU/L-ACNC: 2.35 UIU/ML (ref 0.4–4.2)
WBC # BLD: 6.8 THOU/MM3 (ref 4.8–10.8)

## 2019-02-18 PROCEDURE — G8417 CALC BMI ABV UP PARAM F/U: HCPCS | Performed by: NURSE PRACTITIONER

## 2019-02-18 PROCEDURE — 1036F TOBACCO NON-USER: CPT | Performed by: NURSE PRACTITIONER

## 2019-02-18 PROCEDURE — G8484 FLU IMMUNIZE NO ADMIN: HCPCS | Performed by: NURSE PRACTITIONER

## 2019-02-18 PROCEDURE — 36415 COLL VENOUS BLD VENIPUNCTURE: CPT | Performed by: NURSE PRACTITIONER

## 2019-02-18 PROCEDURE — 99214 OFFICE O/P EST MOD 30 MIN: CPT | Performed by: NURSE PRACTITIONER

## 2019-02-18 PROCEDURE — G8427 DOCREV CUR MEDS BY ELIG CLIN: HCPCS | Performed by: NURSE PRACTITIONER

## 2019-02-18 RX ORDER — LEVOTHYROXINE SODIUM 0.12 MG/1
125 TABLET ORAL DAILY
Qty: 90 TABLET | Refills: 0 | Status: SHIPPED | OUTPATIENT
Start: 2019-02-18 | End: 2019-03-25 | Stop reason: DRUGHIGH

## 2019-02-18 RX ORDER — DULOXETIN HYDROCHLORIDE 60 MG/1
60 CAPSULE, DELAYED RELEASE ORAL DAILY
Qty: 30 CAPSULE | Refills: 3 | Status: SHIPPED | OUTPATIENT
Start: 2019-02-18 | End: 2019-06-28 | Stop reason: SDUPTHER

## 2019-02-18 RX ORDER — CETIRIZINE HYDROCHLORIDE 10 MG/1
10 TABLET ORAL DAILY PRN
Qty: 90 TABLET | Refills: 1 | Status: SHIPPED | OUTPATIENT
Start: 2019-02-18 | End: 2019-12-20 | Stop reason: ALTCHOICE

## 2019-02-18 RX ORDER — SERTRALINE HYDROCHLORIDE 100 MG/1
100 TABLET, FILM COATED ORAL DAILY
Qty: 90 TABLET | Refills: 2 | Status: CANCELLED | OUTPATIENT
Start: 2019-02-18

## 2019-02-18 RX ORDER — DULOXETIN HYDROCHLORIDE 30 MG/1
30 CAPSULE, DELAYED RELEASE ORAL DAILY
Qty: 10 CAPSULE | Refills: 3 | Status: SHIPPED | OUTPATIENT
Start: 2019-02-18 | End: 2019-03-25 | Stop reason: ALTCHOICE

## 2019-02-18 RX ORDER — BUSPIRONE HYDROCHLORIDE 7.5 MG/1
7.5 TABLET ORAL 2 TIMES DAILY
Qty: 180 TABLET | Refills: 2 | Status: SHIPPED | OUTPATIENT
Start: 2019-02-18 | End: 2019-12-20 | Stop reason: DRUGHIGH

## 2019-02-18 RX ORDER — VERAPAMIL HYDROCHLORIDE 120 MG/1
120 CAPSULE, EXTENDED RELEASE ORAL NIGHTLY
Qty: 90 CAPSULE | Refills: 2 | Status: SHIPPED | OUTPATIENT
Start: 2019-02-18 | End: 2020-01-27 | Stop reason: SDUPTHER

## 2019-02-18 ASSESSMENT — PATIENT HEALTH QUESTIONNAIRE - PHQ9
SUM OF ALL RESPONSES TO PHQ QUESTIONS 1-9: 1
SUM OF ALL RESPONSES TO PHQ QUESTIONS 1-9: 1
SUM OF ALL RESPONSES TO PHQ9 QUESTIONS 1 & 2: 1
2. FEELING DOWN, DEPRESSED OR HOPELESS: 1
1. LITTLE INTEREST OR PLEASURE IN DOING THINGS: 0

## 2019-02-19 LAB — THYROID PEROXIDASE ANTIBODY: 0.4 IU/ML (ref 0–9)

## 2019-02-19 ASSESSMENT — ENCOUNTER SYMPTOMS
EYES NEGATIVE: 1
GASTROINTESTINAL NEGATIVE: 1
RESPIRATORY NEGATIVE: 1

## 2019-03-04 ENCOUNTER — HOSPITAL ENCOUNTER (OUTPATIENT)
Dept: ULTRASOUND IMAGING | Age: 39
Discharge: HOME | End: 2019-03-04
Payer: COMMERCIAL

## 2019-03-04 DIAGNOSIS — E03.9 HYPOTHYROIDISM, UNSPECIFIED TYPE: ICD-10-CM

## 2019-03-04 DIAGNOSIS — E04.9 ENLARGED THYROID: ICD-10-CM

## 2019-03-04 PROCEDURE — 76536 US EXAM OF HEAD AND NECK: CPT

## 2019-03-07 ENCOUNTER — TELEPHONE (OUTPATIENT)
Dept: FAMILY MEDICINE CLINIC | Age: 39
End: 2019-03-07

## 2019-03-25 ENCOUNTER — OFFICE VISIT (OUTPATIENT)
Dept: FAMILY MEDICINE CLINIC | Age: 39
End: 2019-03-25
Payer: COMMERCIAL

## 2019-03-25 VITALS
WEIGHT: 226.6 LBS | OXYGEN SATURATION: 98 % | HEART RATE: 71 BPM | BODY MASS INDEX: 41.7 KG/M2 | RESPIRATION RATE: 16 BRPM | HEIGHT: 62 IN | TEMPERATURE: 98.4 F | DIASTOLIC BLOOD PRESSURE: 64 MMHG | SYSTOLIC BLOOD PRESSURE: 98 MMHG

## 2019-03-25 DIAGNOSIS — F43.23 SITUATIONAL MIXED ANXIETY AND DEPRESSIVE DISORDER: ICD-10-CM

## 2019-03-25 DIAGNOSIS — R63.5 WEIGHT GAIN: ICD-10-CM

## 2019-03-25 DIAGNOSIS — E66.9 OBESITY, UNSPECIFIED CLASSIFICATION, UNSPECIFIED OBESITY TYPE, UNSPECIFIED WHETHER SERIOUS COMORBIDITY PRESENT: ICD-10-CM

## 2019-03-25 DIAGNOSIS — R53.83 FATIGUE, UNSPECIFIED TYPE: Primary | ICD-10-CM

## 2019-03-25 DIAGNOSIS — E03.9 HYPOTHYROIDISM, UNSPECIFIED TYPE: ICD-10-CM

## 2019-03-25 PROCEDURE — G8427 DOCREV CUR MEDS BY ELIG CLIN: HCPCS | Performed by: NURSE PRACTITIONER

## 2019-03-25 PROCEDURE — G8484 FLU IMMUNIZE NO ADMIN: HCPCS | Performed by: NURSE PRACTITIONER

## 2019-03-25 PROCEDURE — G8417 CALC BMI ABV UP PARAM F/U: HCPCS | Performed by: NURSE PRACTITIONER

## 2019-03-25 PROCEDURE — 99214 OFFICE O/P EST MOD 30 MIN: CPT | Performed by: NURSE PRACTITIONER

## 2019-03-25 PROCEDURE — 1036F TOBACCO NON-USER: CPT | Performed by: NURSE PRACTITIONER

## 2019-03-25 RX ORDER — LEVOTHYROXINE SODIUM 0.15 MG/1
150 TABLET ORAL DAILY
Qty: 30 TABLET | Refills: 3 | Status: SHIPPED | OUTPATIENT
Start: 2019-03-25 | End: 2019-05-30 | Stop reason: DRUGHIGH

## 2019-03-28 ASSESSMENT — ENCOUNTER SYMPTOMS
EYES NEGATIVE: 1
RESPIRATORY NEGATIVE: 1
GASTROINTESTINAL NEGATIVE: 1

## 2019-05-10 ENCOUNTER — NURSE ONLY (OUTPATIENT)
Dept: FAMILY MEDICINE CLINIC | Age: 39
End: 2019-05-10
Payer: COMMERCIAL

## 2019-05-10 DIAGNOSIS — E03.9 HYPOTHYROIDISM, UNSPECIFIED TYPE: ICD-10-CM

## 2019-05-10 LAB
T4 FREE: 1.1 NG/DL (ref 0.93–1.76)
TSH SERPL DL<=0.05 MIU/L-ACNC: 0.57 UIU/ML (ref 0.4–4.2)

## 2019-05-10 PROCEDURE — 36415 COLL VENOUS BLD VENIPUNCTURE: CPT | Performed by: NURSE PRACTITIONER

## 2019-05-30 ENCOUNTER — OFFICE VISIT (OUTPATIENT)
Dept: FAMILY MEDICINE CLINIC | Age: 39
End: 2019-05-30
Payer: COMMERCIAL

## 2019-05-30 VITALS
TEMPERATURE: 97.8 F | SYSTOLIC BLOOD PRESSURE: 122 MMHG | HEART RATE: 72 BPM | RESPIRATION RATE: 16 BRPM | DIASTOLIC BLOOD PRESSURE: 64 MMHG | BODY MASS INDEX: 40.93 KG/M2 | WEIGHT: 220.2 LBS

## 2019-05-30 DIAGNOSIS — E66.9 OBESITY, UNSPECIFIED CLASSIFICATION, UNSPECIFIED OBESITY TYPE, UNSPECIFIED WHETHER SERIOUS COMORBIDITY PRESENT: Primary | ICD-10-CM

## 2019-05-30 DIAGNOSIS — F43.23 SITUATIONAL MIXED ANXIETY AND DEPRESSIVE DISORDER: ICD-10-CM

## 2019-05-30 DIAGNOSIS — Z86.69 HISTORY OF MIGRAINE HEADACHES: ICD-10-CM

## 2019-05-30 DIAGNOSIS — R53.83 FATIGUE, UNSPECIFIED TYPE: ICD-10-CM

## 2019-05-30 DIAGNOSIS — F32.A DEPRESSION, UNSPECIFIED DEPRESSION TYPE: ICD-10-CM

## 2019-05-30 DIAGNOSIS — F51.5 NIGHTMARES: ICD-10-CM

## 2019-05-30 PROCEDURE — 99214 OFFICE O/P EST MOD 30 MIN: CPT | Performed by: NURSE PRACTITIONER

## 2019-05-30 PROCEDURE — G8417 CALC BMI ABV UP PARAM F/U: HCPCS | Performed by: NURSE PRACTITIONER

## 2019-05-30 PROCEDURE — 1036F TOBACCO NON-USER: CPT | Performed by: NURSE PRACTITIONER

## 2019-05-30 PROCEDURE — G8427 DOCREV CUR MEDS BY ELIG CLIN: HCPCS | Performed by: NURSE PRACTITIONER

## 2019-05-30 RX ORDER — LEVOTHYROXINE SODIUM 0.12 MG/1
1 TABLET ORAL DAILY
Refills: 0 | COMMUNITY
Start: 2019-04-28 | End: 2019-10-15 | Stop reason: SDUPTHER

## 2019-05-30 NOTE — PATIENT INSTRUCTIONS
Patient Education        Learning About Low-Carbohydrate Diets for Weight Loss  What is a low-carbohydrate diet? Low-carb diets avoid foods that are high in carbohydrate. These high-carb foods include pasta, bread, rice, cereal, fruits, and starchy vegetables. Instead, these diets usually have you eat foods that are high in fat and protein. Many people lose weight quickly on a low-carb diet. But the early weight loss is water. People on this diet often gain the weight back after they start eating carbs again. Not all diet plans are safe or work well. A lot of the evidence shows that low-carb diets aren't healthy. That's because these diets often don't include healthy foods like fruits and vegetables. Losing weight safely means balancing protein, fat, and carbs with every meal and snack. And low-carb diets don't always provide the vitamins, minerals, and fiber you need. If you have a serious medical condition, talk to your doctor before you try any diet. These conditions include kidney disease, heart disease, type 2 diabetes, high cholesterol, and high blood pressure. If you are pregnant, it may not be safe for your baby if you are on a low-carb diet. How can you lose weight safely? You might have heard that a diet plan helped another person lose weight. But that doesn't mean that it will work for you. It is very hard to stay on a diet that includes lots of big changes in your eating habits. If you want to get to a healthy weight and stay there, making healthy lifestyle changes will often work better than dieting. These steps can help. · Make a plan for change. Work with your doctor to create a plan that is right for you. · See a dietitian. He or she can show you how to make healthy changes in your eating habits. · Manage stress. If you have a lot of stress in your life, it can be hard to focus on making healthy changes to your daily habits. · Track your food and activity.  You are likely to do better at losing weight if you keep track of what you eat and what you do. Follow-up care is a key part of your treatment and safety. Be sure to make and go to all appointments, and call your doctor if you are having problems. It's also a good idea to know your test results and keep a list of the medicines you take. Where can you learn more? Go to https://chpepiceweb.[employer]. org and sign in to your [employer] account. Enter A121 in the [employer] box to learn more about \"Learning About Low-Carbohydrate Diets for Weight Loss. \"     If you do not have an account, please click on the \"Sign Up Now\" link. Current as of: November 7, 2018  Content Version: 12.0  © 1213-9654 Healthwise, Incorporated. Care instructions adapted under license by [facility] [place] ([facility]). If you have questions about a medical condition or this instruction, always ask your healthcare professional. [place] [address] any warranty or liability for your use of this information. Patient Education        Learning About Cutting Calories  How do calories affect your weight? Food gives your body energy. Energy from the food you eat is measured in calories. This energy keeps your heart beating, your brain active, and your muscles working. Your body needs a certain number of calories each day. After your body uses the calories it needs, it stores extra calories as fat. To lose weight safely, you have to eat fewer calories while eating in a healthy way. How many calories do you need each day? The more active you are, the more calories you need. When you are less active, you need fewer calories. How many calories you need each day also depends on several things, including your age and whether you are male or female. Here are some general guidelines for adults:  · Less active women and older adults need 1,600 to 2,000 calories each day. · Active women and less active men need 2,000 to 2,400 calories each day.   · Active men need 2,400 to 3,000 calories each day. How can you cut calories and eat healthy meals? Whole grains, vegetables and fruits, and dried beans are good lower-calorie foods. They give you lots of nutrients and fiber. And they fill you up. Sweets, energy drinks, and soda pop are high in calories. They give you few nutrients and no fiber. Try to limit soda pop, fruit juice, and energy drinks. Drink water instead. Some fats can be part of a healthy diet. But cutting back on fats from highly processed foods like fast foods and many snack foods is a good way to lower the calories in your diet. Also, use smaller amounts of fats like butter, margarine, salad dressing, and mayonnaise. Add fresh garlic, lemon, or herbs to your meals to add flavor without adding fat. Meats and dairy products can be a big source of hidden fats. Try to choose lean or low-fat versions of these products. Fat-free cookies, candies, chips, and frozen treats can still be high in sugar and calories. Some fat-free foods have more calories than regular ones. Eat fat-free treats in moderation, as you would other foods. If your favorite foods are high in fat, salt, sugar, or calories, limit how often you eat them. Eat smaller servings, or look for healthy substitutes. Fill up on fruits, vegetables, and whole grains. Eating at home  · Use meat as a side dish instead of as the main part of your meal.  · Try main dishes that use whole wheat pasta, brown rice, dried beans, or vegetables. · Find ways to cook with little or no fat, such as broiling, steaming, or grilling. · Use cooking spray instead of oil. If you use oil, use a monounsaturated oil, such as canola or olive oil. · Trim fat from meats before you cook them. · Drain off fat after you brown the meat or while you roast it. · Chill soups and stews after you cook them. Then skim the fat off the top after it hardens.   Eating out  · Order foods that are broiled or poached rather than fried or

## 2019-06-26 ENCOUNTER — OFFICE VISIT (OUTPATIENT)
Dept: FAMILY MEDICINE CLINIC | Age: 39
End: 2019-06-26
Payer: COMMERCIAL

## 2019-06-26 VITALS
RESPIRATION RATE: 20 BRPM | HEIGHT: 62 IN | WEIGHT: 217 LBS | BODY MASS INDEX: 39.93 KG/M2 | TEMPERATURE: 98.8 F | DIASTOLIC BLOOD PRESSURE: 56 MMHG | SYSTOLIC BLOOD PRESSURE: 90 MMHG | HEART RATE: 104 BPM

## 2019-06-26 DIAGNOSIS — H65.01 NON-RECURRENT ACUTE SEROUS OTITIS MEDIA OF RIGHT EAR: Primary | ICD-10-CM

## 2019-06-26 PROCEDURE — G8417 CALC BMI ABV UP PARAM F/U: HCPCS | Performed by: NURSE PRACTITIONER

## 2019-06-26 PROCEDURE — 1036F TOBACCO NON-USER: CPT | Performed by: NURSE PRACTITIONER

## 2019-06-26 PROCEDURE — 99213 OFFICE O/P EST LOW 20 MIN: CPT | Performed by: NURSE PRACTITIONER

## 2019-06-26 PROCEDURE — G8427 DOCREV CUR MEDS BY ELIG CLIN: HCPCS | Performed by: NURSE PRACTITIONER

## 2019-06-26 RX ORDER — CEFDINIR 300 MG/1
300 CAPSULE ORAL 2 TIMES DAILY
Qty: 20 CAPSULE | Refills: 0 | Status: SHIPPED | OUTPATIENT
Start: 2019-06-26 | End: 2019-07-06

## 2019-06-26 ASSESSMENT — ENCOUNTER SYMPTOMS
SHORTNESS OF BREATH: 0
WHEEZING: 0
CONSTIPATION: 0
DIARRHEA: 0
COUGH: 0
SORE THROAT: 1

## 2019-06-26 NOTE — PROGRESS NOTES
[address] [address]  Dept: [phone]  Dept Fax: [phone]: [phone]     Visit Date:  6/26/2019      Patient:  Sophia Irizarry  YOB: 1980    HPI:     Chief Complaint   Patient presents with    Pharyngitis     right earache, felt flushed, chills. Sx for 3 days. Has taken motrin, otc cough and cold syrup. Otalgia    There is pain in the right ear. This is a new problem. The current episode started in the past 7 days. The problem occurs constantly. The problem has been gradually worsening. There has been no fever. The pain is moderate. Associated symptoms include headaches and a sore throat. Pertinent negatives include no coughing or diarrhea. She has tried nothing for the symptoms. There is no history of a chronic ear infection, hearing loss or a tympanostomy tube. Medications    Current Outpatient Medications:     cefdinir (OMNICEF) 300 MG capsule, Take 1 capsule by mouth 2 times daily for 10 days, Disp: 20 capsule, Rfl: 0    levothyroxine (SYNTHROID) 125 MCG tablet, Take 1 tablet by mouth daily, Disp: , Rfl: 0    naltrexone-bupropion (CONTRAVE) 8-90 MG per extended release tablet, Take 2 tablets by mouth 2 times daily, Disp: 120 tablet, Rfl: 2    busPIRone (BUSPAR) 7.5 MG tablet, Take 1 tablet by mouth 2 times daily, Disp: 180 tablet, Rfl: 2    verapamil (VERELAN) 120 MG extended release capsule, Take 1 capsule by mouth nightly, Disp: 90 capsule, Rfl: 2    DULoxetine (CYMBALTA) 60 MG extended release capsule, Take 1 capsule by mouth daily, Disp: 30 capsule, Rfl: 3    cetirizine (ZYRTEC ALLERGY) 10 MG tablet, Take 1 tablet by mouth daily as needed for Allergies, Disp: 90 tablet, Rfl: 1    The patient has No Known Allergies. Past Medical History  Kristin HINTON  has a past medical history of Anemia, Headache, Hypothyroidism, and Obesity.     Subjective:      Review of Systems Constitutional: Negative for appetite change. HENT: Positive for congestion, ear pain and sore throat. Negative for tinnitus. Eyes: Negative for visual disturbance. Respiratory: Negative for cough, shortness of breath and wheezing. Cardiovascular: Negative for leg swelling. Gastrointestinal: Negative for constipation and diarrhea. Genitourinary: Negative for frequency. Musculoskeletal: Negative for neck stiffness. Neurological: Positive for headaches. Negative for dizziness. Psychiatric/Behavioral: The patient is not nervous/anxious. All other systems reviewed and are negative. Objective:     BP (!) 90/56   Pulse 104   Temp 98.8 °F (37.1 °C) (Oral)   Resp 20   Ht 5' 2\" (1.575 m)   Wt 217 lb (98.4 kg)   BMI 39.69 kg/m²     Physical Exam   Constitutional: She is oriented to person, place, and time. She appears well-developed and well-nourished. She is cooperative. No distress. HENT:   Right Ear: Hearing, external ear and ear canal normal. Tympanic membrane is erythematous and bulging. Left Ear: Hearing, tympanic membrane, external ear and ear canal normal.   Nose: Mucosal edema present. No rhinorrhea. Mouth/Throat: Uvula is midline, oropharynx is clear and moist and mucous membranes are normal. No uvula swelling. No oropharyngeal exudate, posterior oropharyngeal edema or posterior oropharyngeal erythema. Eyes: Pupils are equal, round, and reactive to light. Conjunctivae, EOM and lids are normal. Right eye exhibits no discharge. Left eye exhibits no discharge. Neck: Full passive range of motion without pain. No muscular tenderness present. Cardiovascular: Normal rate, regular rhythm and normal heart sounds. Pulmonary/Chest: Effort normal and breath sounds normal. No accessory muscle usage. No respiratory distress. She has no wheezes. She has no rales. Lymphadenopathy:     She has no cervical adenopathy.    Neurological: She is alert and oriented to person, place, and

## 2019-06-27 ASSESSMENT — ENCOUNTER SYMPTOMS
SHORTNESS OF BREATH: 0
APNEA: 0
WHEEZING: 0
PHOTOPHOBIA: 0
ABDOMINAL PAIN: 0
COUGH: 0
EYE DISCHARGE: 0
ABDOMINAL DISTENTION: 0
CHEST TIGHTNESS: 0
EYE PAIN: 0
RHINORRHEA: 0
BACK PAIN: 0

## 2019-06-27 NOTE — PROGRESS NOTES
[address] [address]  Dept: [phone]  Dept Fax: [phone]  Loc: [phone]  PROGRESS NOTE      VisitDate: 5/30/2019    Sheldon Renteria is a 44 y.o. female who presents today for:     Chief Complaint   Patient presents with    Anxiety     doing well, no other complaints    Medication Refill         Subjective:  Patient presents for follow-up regarding her anxiety, depression. Reports doing well, mood stable no other complaints. Patient requesting medication refill. History of anemia, headaches, hypothyroidism, anxiety, nightmares, depression and obesity. Patient requesting medication to aid with weight loss. .  Reports that she has reached a plateau regarding weight loss despite daily exercise regimen and significant dietary changes. Review of Systems   Constitutional: Negative for activity change, appetite change, chills, fatigue and fever. HENT: Negative for ear discharge, ear pain, hearing loss, rhinorrhea and tinnitus. Eyes: Negative for photophobia, pain, discharge and visual disturbance. Respiratory: Negative for apnea, cough, chest tightness, shortness of breath and wheezing. Cardiovascular: Negative for chest pain, palpitations and leg swelling. Gastrointestinal: Negative for abdominal distention and abdominal pain. Genitourinary: Negative for decreased urine volume, difficulty urinating, dysuria, flank pain, frequency, hematuria and urgency. Musculoskeletal: Negative for arthralgias, back pain, joint swelling, myalgias and neck pain. Skin: Negative. Neurological: Negative for dizziness, tremors, light-headedness, numbness and headaches. Hematological: Negative for adenopathy. Psychiatric/Behavioral: Positive for decreased concentration, dysphoric mood and sleep disturbance. Negative for confusion. The patient is nervous/anxious.     All other systems reviewed and are negative. Past Medical History:   Diagnosis Date    Anemia     Headache     Hypothyroidism     Obesity       Past Surgical History:   Procedure Laterality Date     SECTION      COLON SURGERY      HYSTERECTOMY       Family History   Problem Relation Age of Onset    Anemia Sister     Asthma Sister     Arthritis Maternal Grandmother     Cancer Maternal Grandmother     Hearing Loss Maternal Grandmother     Miscarriages / Stillbirths Maternal Grandmother     Allergy (Severe) Maternal Grandfather     Arthritis Maternal Grandfather     Arrhythmia Maternal Grandfather     Asthma Maternal Grandfather     Coronary Art Dis Maternal Grandfather     Depression Maternal Grandfather     Diabetes Maternal Grandfather     Hearing Loss Maternal Grandfather     High Blood Pressure Maternal Grandfather     Lupus Mother      Social History     Tobacco Use    Smoking status: Never Smoker    Smokeless tobacco: Never Used   Substance Use Topics    Alcohol use: No      Current Outpatient Medications   Medication Sig Dispense Refill    levothyroxine (SYNTHROID) 125 MCG tablet Take 1 tablet by mouth daily  0    naltrexone-bupropion (CONTRAVE) 8-90 MG per extended release tablet Take 2 tablets by mouth 2 times daily 120 tablet 2    cetirizine (ZYRTEC ALLERGY) 10 MG tablet Take 1 tablet by mouth daily as needed for Allergies 90 tablet 1    busPIRone (BUSPAR) 7.5 MG tablet Take 1 tablet by mouth 2 times daily 180 tablet 2    verapamil (VERELAN) 120 MG extended release capsule Take 1 capsule by mouth nightly 90 capsule 2    DULoxetine (CYMBALTA) 60 MG extended release capsule Take 1 capsule by mouth daily 30 capsule 3    cefdinir (OMNICEF) 300 MG capsule Take 1 capsule by mouth 2 times daily for 10 days 20 capsule 0     No current facility-administered medications for this visit.       No Known Allergies  Health Maintenance   Topic Date Due    Varicella Vaccine (1 of 2 - 13+ 2-dose series) 1993  HIV screen  05/25/1995    DTaP/Tdap/Td vaccine (1 - Tdap) 05/25/1999    Cervical cancer screen  05/25/2001    Flu vaccine (Season Ended) 09/01/2019    Pneumococcal 0-64 years Vaccine  Aged Out         Objective:     Physical Exam   Constitutional: She is oriented to person, place, and time. She appears well-developed and well-nourished. HENT:   Head: Normocephalic. Right Ear: External ear normal.   Left Ear: External ear normal.   Nose: Nose normal.   Mouth/Throat: Oropharynx is clear and moist.   Eyes: Pupils are equal, round, and reactive to light. Conjunctivae and EOM are normal.   Neck: Normal range of motion. Neck supple. No JVD present. No thyromegaly present. Cardiovascular: Normal rate, regular rhythm, normal heart sounds and intact distal pulses. Pulmonary/Chest: Effort normal and breath sounds normal.   Abdominal: Soft. Bowel sounds are normal.   Musculoskeletal: Normal range of motion. Lymphadenopathy:     She has no cervical adenopathy. Neurological: She is alert and oriented to person, place, and time. Skin: Skin is warm and dry. Psychiatric: Her speech is normal and behavior is normal. Judgment normal. Her mood appears anxious. Thought content is not paranoid. Cognition and memory are normal. She exhibits a depressed mood. She expresses no homicidal and no suicidal ideation. Nursing note and vitals reviewed. /64 (Site: Right Upper Arm)   Pulse 72   Temp 97.8 °F (36.6 °C) (Oral)   Resp 16   Wt 220 lb 3.2 oz (99.9 kg)   BMI 40.93 kg/m²       Impression/Plan:  1. Obesity, unspecified classification, unspecified obesity type, unspecified whether serious comorbidity present    2. Situational mixed anxiety and depressive disorder    3. Depression, unspecified depression type    4. History of migraine headaches    5. Nightmares    6.  Fatigue, unspecified type      Requested Prescriptions     Signed Prescriptions Disp Refills    naltrexone-bupropion (CONTRAVE) 8-90 MG

## 2019-06-28 RX ORDER — DULOXETIN HYDROCHLORIDE 60 MG/1
60 CAPSULE, DELAYED RELEASE ORAL DAILY
Qty: 30 CAPSULE | Refills: 5 | Status: SHIPPED | OUTPATIENT
Start: 2019-06-28 | End: 2020-01-27 | Stop reason: SDUPTHER

## 2019-10-15 RX ORDER — LEVOTHYROXINE SODIUM 0.12 MG/1
TABLET ORAL
Qty: 90 TABLET | Refills: 0 | Status: SHIPPED | OUTPATIENT
Start: 2019-10-15 | End: 2020-01-27 | Stop reason: SDUPTHER

## 2019-12-20 ENCOUNTER — OFFICE VISIT (OUTPATIENT)
Dept: FAMILY MEDICINE CLINIC | Age: 39
End: 2019-12-20
Payer: COMMERCIAL

## 2019-12-20 VITALS
HEIGHT: 61 IN | DIASTOLIC BLOOD PRESSURE: 62 MMHG | TEMPERATURE: 98.4 F | WEIGHT: 232.4 LBS | HEART RATE: 79 BPM | SYSTOLIC BLOOD PRESSURE: 100 MMHG | RESPIRATION RATE: 16 BRPM | BODY MASS INDEX: 43.88 KG/M2

## 2019-12-20 DIAGNOSIS — F33.1 MODERATE EPISODE OF RECURRENT MAJOR DEPRESSIVE DISORDER (HCC): ICD-10-CM

## 2019-12-20 DIAGNOSIS — F43.23 SITUATIONAL MIXED ANXIETY AND DEPRESSIVE DISORDER: Primary | ICD-10-CM

## 2019-12-20 DIAGNOSIS — E66.9 OBESITY, UNSPECIFIED CLASSIFICATION, UNSPECIFIED OBESITY TYPE, UNSPECIFIED WHETHER SERIOUS COMORBIDITY PRESENT: ICD-10-CM

## 2019-12-20 PROCEDURE — G8417 CALC BMI ABV UP PARAM F/U: HCPCS | Performed by: NURSE PRACTITIONER

## 2019-12-20 PROCEDURE — 99214 OFFICE O/P EST MOD 30 MIN: CPT | Performed by: NURSE PRACTITIONER

## 2019-12-20 PROCEDURE — G8427 DOCREV CUR MEDS BY ELIG CLIN: HCPCS | Performed by: NURSE PRACTITIONER

## 2019-12-20 PROCEDURE — G8484 FLU IMMUNIZE NO ADMIN: HCPCS | Performed by: NURSE PRACTITIONER

## 2019-12-20 PROCEDURE — 1036F TOBACCO NON-USER: CPT | Performed by: NURSE PRACTITIONER

## 2019-12-20 RX ORDER — BUSPIRONE HYDROCHLORIDE 15 MG/1
15 TABLET ORAL 2 TIMES DAILY
Qty: 60 TABLET | Refills: 2 | Status: SHIPPED | OUTPATIENT
Start: 2019-12-20 | End: 2020-01-27 | Stop reason: SDUPTHER

## 2019-12-23 RX ORDER — BUSPIRONE HYDROCHLORIDE 7.5 MG/1
TABLET ORAL
Qty: 180 TABLET | Refills: 2 | Status: SHIPPED | OUTPATIENT
Start: 2019-12-23 | End: 2020-01-27 | Stop reason: ALTCHOICE

## 2020-01-27 ENCOUNTER — OFFICE VISIT (OUTPATIENT)
Dept: FAMILY MEDICINE CLINIC | Age: 40
End: 2020-01-27
Payer: COMMERCIAL

## 2020-01-27 VITALS
DIASTOLIC BLOOD PRESSURE: 68 MMHG | HEIGHT: 61 IN | BODY MASS INDEX: 43.95 KG/M2 | RESPIRATION RATE: 16 BRPM | SYSTOLIC BLOOD PRESSURE: 110 MMHG | HEART RATE: 71 BPM | TEMPERATURE: 98.5 F | WEIGHT: 232.8 LBS

## 2020-01-27 PROCEDURE — G8427 DOCREV CUR MEDS BY ELIG CLIN: HCPCS | Performed by: NURSE PRACTITIONER

## 2020-01-27 PROCEDURE — 99213 OFFICE O/P EST LOW 20 MIN: CPT | Performed by: NURSE PRACTITIONER

## 2020-01-27 PROCEDURE — G8417 CALC BMI ABV UP PARAM F/U: HCPCS | Performed by: NURSE PRACTITIONER

## 2020-01-27 PROCEDURE — 1036F TOBACCO NON-USER: CPT | Performed by: NURSE PRACTITIONER

## 2020-01-27 PROCEDURE — G8484 FLU IMMUNIZE NO ADMIN: HCPCS | Performed by: NURSE PRACTITIONER

## 2020-01-27 RX ORDER — BUSPIRONE HYDROCHLORIDE 15 MG/1
15 TABLET ORAL 2 TIMES DAILY
Qty: 60 TABLET | Refills: 2 | Status: SHIPPED | OUTPATIENT
Start: 2020-01-27 | End: 2020-03-23

## 2020-01-27 RX ORDER — VERAPAMIL HYDROCHLORIDE 120 MG/1
120 CAPSULE, EXTENDED RELEASE ORAL NIGHTLY
Qty: 90 CAPSULE | Refills: 2 | Status: SHIPPED | OUTPATIENT
Start: 2020-01-27 | End: 2020-11-18

## 2020-01-27 RX ORDER — LEVOTHYROXINE SODIUM 0.12 MG/1
TABLET ORAL
Qty: 90 TABLET | Refills: 0 | Status: SHIPPED | OUTPATIENT
Start: 2020-01-27 | End: 2020-03-23

## 2020-01-27 RX ORDER — DULOXETIN HYDROCHLORIDE 60 MG/1
60 CAPSULE, DELAYED RELEASE ORAL DAILY
Qty: 30 CAPSULE | Refills: 5 | Status: SHIPPED | OUTPATIENT
Start: 2020-01-27 | End: 2020-12-09

## 2020-01-27 ASSESSMENT — PATIENT HEALTH QUESTIONNAIRE - PHQ9
SUM OF ALL RESPONSES TO PHQ QUESTIONS 1-9: 0
SUM OF ALL RESPONSES TO PHQ9 QUESTIONS 1 & 2: 0
1. LITTLE INTEREST OR PLEASURE IN DOING THINGS: 0
SUM OF ALL RESPONSES TO PHQ QUESTIONS 1-9: 0
2. FEELING DOWN, DEPRESSED OR HOPELESS: 0

## 2020-01-27 NOTE — PATIENT INSTRUCTIONS
losing weight if you keep track of what you eat and what you do. Follow-up care is a key part of your treatment and safety. Be sure to make and go to all appointments, and call your doctor if you are having problems. It's also a good idea to know your test results and keep a list of the medicines you take. Where can you learn more? Go to https://chpepiceweb.[employer]. org and sign in to your [employer] account. Enter A121 in the [employer] box to learn more about \"Learning About Low-Carbohydrate Diets for Weight Loss. \"     If you do not have an account, please click on the \"Sign Up Now\" link. Current as of: August 21, 2019  Content Version: 12.3  © 8872-0967 Healthwise, [employer]. Care instructions adapted under license by [facility] ([facility]). If you have questions about a medical condition or this instruction, always ask your healthcare professional. [place] [address] any warranty or liability for your use of this information. Patient Education        Starting a Weight Loss Plan: Care Instructions  Your Care Instructions    If you are thinking about losing weight, it can be hard to know where to start. Your doctor can help you set up a weight loss plan that best meets your needs. You may want to take a class on nutrition or exercise, or join a weight loss support group. If you have questions about how to make changes to your eating or exercise habits, ask your doctor about seeing a registered dietitian or an exercise specialist.  It can be a big challenge to lose weight. But you do not have to make huge changes at once. Make small changes, and stick with them. When those changes become habit, add a few more changes. If you do not think you are ready to make changes right now, try to pick a date in the future. Make an appointment to see your doctor to discuss whether the time is right for you to start a plan. Follow-up care is a key part of your treatment and safety.  Be sure to make and go to all appointments, and call your doctor if you are having problems. It's also a good idea to know your test results and keep a list of the medicines you take. How can you care for yourself at home? · Set realistic goals. Many people expect to lose much more weight than is likely. A weight loss of 5% to 10% of your body weight may be enough to improve your health. · Get family and friends involved to provide support. Talk to them about why you are trying to lose weight, and ask them to help. They can help by participating in exercise and having meals with you, even if they may be eating something different. · Find what works best for you. If you do not have time or do not like to cook, a program that offers meal replacement bars or shakes may be better for you. Or if you like to prepare meals, finding a plan that includes daily menus and recipes may be best.  · Ask your doctor about other health professionals who can help you achieve your weight loss goals. ? A dietitian can help you make healthy changes in your diet. ? An exercise specialist or  can help you develop a safe and effective exercise program.  ? A counselor or psychiatrist can help you cope with issues such as depression, anxiety, or family problems that can make it hard to focus on weight loss. · Consider joining a support group for people who are trying to lose weight. Your doctor can suggest groups in your area. Where can you learn more? Go to https://NeoChordjimena.[employer]. org and sign in to your [employer] account. Enter W454 in the [employer] box to learn more about \"Starting a Weight Loss Plan: Care Instructions. \"     If you do not have an account, please click on the \"Sign Up Now\" link. Current as of: March 28, 2019  Content Version: 12.3  © 1893-9760 Healthwise, Incorporated. Care instructions adapted under license by [facility] ([facility]).  If you have questions about a medical condition or this instruction, always ask your healthcare professional. [place] [address] any warranty or liability for your use of this information.

## 2020-01-28 NOTE — PROGRESS NOTES
capsule Take 1 capsule by mouth nightly 90 capsule 2    DULoxetine (CYMBALTA) 60 MG extended release capsule Take 1 capsule by mouth daily 30 capsule 5    levothyroxine (SYNTHROID) 125 MCG tablet take 1 tablet by mouth once daily 90 tablet 0    VORTIoxetine (TRINTELLIX) 10 MG TABS tablet Take 1 tablet by mouth daily 30 tablet 3    busPIRone (BUSPAR) 15 MG tablet Take 15 mg by mouth 2 times daily 60 tablet 2     No current facility-administered medications for this visit. No Known Allergies  Health Maintenance   Topic Date Due    Varicella Vaccine (1 of 2 - 2-dose childhood series) 05/25/1981    DTaP/Tdap/Td vaccine (1 - Tdap) 05/25/1991    HIV screen  05/25/1995    Cervical cancer screen  05/25/2001    Flu vaccine (1) 12/20/2020 (Originally 9/1/2019)    Pneumococcal 0-64 years Vaccine  Aged Out         Objective:     Physical Exam  Vitals signs and nursing note reviewed. Constitutional:       Appearance: Normal appearance. She is normal weight. HENT:      Right Ear: Tympanic membrane normal.      Left Ear: Tympanic membrane normal.      Nose: Nose normal.      Mouth/Throat:      Mouth: Mucous membranes are moist.   Eyes:      Pupils: Pupils are equal, round, and reactive to light. Cardiovascular:      Rate and Rhythm: Normal rate and regular rhythm. Pulses: Normal pulses. Heart sounds: Normal heart sounds. Pulmonary:      Effort: Pulmonary effort is normal.      Breath sounds: Normal breath sounds. Skin:     General: Skin is warm. Capillary Refill: Capillary refill takes 2 to 3 seconds. Neurological:      Mental Status: She is alert. Psychiatric:         Mood and Affect: Mood normal.         Behavior: Behavior normal.         Thought Content: Thought content normal.         Judgment: Judgment normal.       /68   Pulse 71   Temp 98.5 °F (36.9 °C) (Oral)   Resp 16   Ht 5' 1\" (1.549 m)   Wt 232 lb 12.8 oz (105.6 kg)   BMI 43.99 kg/m²       Impression/Plan:  1.

## 2020-03-23 RX ORDER — BUSPIRONE HYDROCHLORIDE 15 MG/1
TABLET ORAL
Qty: 180 TABLET | Refills: 1 | Status: SHIPPED | OUTPATIENT
Start: 2020-03-23 | End: 2020-04-13

## 2020-03-23 RX ORDER — LEVOTHYROXINE SODIUM 0.12 MG/1
TABLET ORAL
Qty: 90 TABLET | Refills: 0 | Status: SHIPPED | OUTPATIENT
Start: 2020-03-23 | End: 2020-04-13

## 2020-03-23 NOTE — TELEPHONE ENCOUNTER
Please approve or deny     Last Visit Date:  1/27/2020       Next Visit Date:    Visit date not found

## 2020-04-13 RX ORDER — LEVOTHYROXINE SODIUM 0.12 MG/1
TABLET ORAL
Qty: 90 TABLET | Refills: 0 | Status: SHIPPED | OUTPATIENT
Start: 2020-04-13 | End: 2020-11-24 | Stop reason: SDUPTHER

## 2020-04-13 RX ORDER — BUSPIRONE HYDROCHLORIDE 15 MG/1
TABLET ORAL
Qty: 180 TABLET | Refills: 1 | Status: SHIPPED | OUTPATIENT
Start: 2020-04-13 | End: 2020-12-09

## 2020-07-21 ENCOUNTER — TELEPHONE (OUTPATIENT)
Dept: FAMILY MEDICINE CLINIC | Age: 40
End: 2020-07-21

## 2020-08-12 ENCOUNTER — TELEPHONE (OUTPATIENT)
Dept: FAMILY MEDICINE CLINIC | Age: 40
End: 2020-08-12

## 2020-08-12 ENCOUNTER — OFFICE VISIT (OUTPATIENT)
Dept: FAMILY MEDICINE CLINIC | Age: 40
End: 2020-08-12
Payer: COMMERCIAL

## 2020-08-12 VITALS
TEMPERATURE: 98.8 F | BODY MASS INDEX: 39.93 KG/M2 | WEIGHT: 217 LBS | HEIGHT: 62 IN | RESPIRATION RATE: 16 BRPM | DIASTOLIC BLOOD PRESSURE: 62 MMHG | SYSTOLIC BLOOD PRESSURE: 100 MMHG | HEART RATE: 78 BPM

## 2020-08-12 PROBLEM — E66.01 MORBIDLY OBESE (HCC): Status: ACTIVE | Noted: 2020-08-12

## 2020-08-12 PROCEDURE — G8417 CALC BMI ABV UP PARAM F/U: HCPCS | Performed by: NURSE PRACTITIONER

## 2020-08-12 PROCEDURE — G8427 DOCREV CUR MEDS BY ELIG CLIN: HCPCS | Performed by: NURSE PRACTITIONER

## 2020-08-12 PROCEDURE — 99214 OFFICE O/P EST MOD 30 MIN: CPT | Performed by: NURSE PRACTITIONER

## 2020-08-12 PROCEDURE — 1036F TOBACCO NON-USER: CPT | Performed by: NURSE PRACTITIONER

## 2020-08-12 RX ORDER — CLONAZEPAM 0.25 MG/1
0.25 TABLET, ORALLY DISINTEGRATING ORAL 3 TIMES DAILY PRN
Qty: 60 TABLET | Refills: 0 | Status: SHIPPED | OUTPATIENT
Start: 2020-08-12 | End: 2020-12-09

## 2020-08-12 RX ORDER — FAMOTIDINE 20 MG/1
20 TABLET, FILM COATED ORAL DAILY
COMMUNITY
Start: 2020-08-06

## 2020-08-12 RX ORDER — ESCITALOPRAM OXALATE 5 MG/5ML
10 SOLUTION ORAL DAILY
Qty: 300 ML | Refills: 3 | Status: SHIPPED | OUTPATIENT
Start: 2020-08-12 | End: 2020-12-09

## 2020-08-12 RX ORDER — ONDANSETRON 4 MG/1
4 TABLET, ORALLY DISINTEGRATING ORAL 3 TIMES DAILY PRN
Qty: 60 TABLET | Refills: 1 | Status: SHIPPED | OUTPATIENT
Start: 2020-08-12

## 2020-08-12 NOTE — TELEPHONE ENCOUNTER
Fax received from [facility] stating that clonazepam 0.25mg ODT is not on pt's formulary, they are requesting an alternative medication. Please advise.

## 2020-08-13 RX ORDER — CITALOPRAM HYDROBROMIDE 20 MG/10ML
10 SOLUTION, ORAL ORAL DAILY
Qty: 300 ML | Refills: 3 | Status: SHIPPED | OUTPATIENT
Start: 2020-08-13 | End: 2020-12-09

## 2020-08-13 ASSESSMENT — ENCOUNTER SYMPTOMS
NAUSEA: 1
VOMITING: 1

## 2020-08-13 NOTE — PROGRESS NOTES
[address] [place] [facility] [address]  Dept: [phone]  Dept Fax: [phone]  Loc: [phone]  PROGRESS NOTE      VisitDate: 8/12/2020    Cady Grace is a 36 y.o. female who presents today for:     Chief Complaint   Patient presents with    Discuss Medications     Had gastric sleeve surgery on 7-9-20, since then she cannot keep her meds down. Subjective:  Patient presents with complaint of medication intolerance of all pills and capsules since gastric sleeve surgery on 7/9/2020. Patient complains of intense chronic nausea since surgery. Reports that nausea has gradually lessened to intermittent status. Patient has attempted to open capsules, crush tablets with no benefit. Requesting medication for nausea and vomiting. Is scheduled to follow-up with surgeon in 1 week. History of headaches, hypothyroidism, obesity, depression, anxiety and insomnia. Patient reports major depressive mood, increased agitation, easily overwhelmed, heightened anxiety/panic which has worsened over the past several weeks. Patient reports that she lived in her car for 4 days last week. Denies any homicidal or suicidal ideations currently. Patient reports that she has lost approximately 24 pounds since surgery. Denies any syncope, dizziness, chest pain, shortness of breath. Denies blood in stool or melena. Denies constipation or obstipation. Currently patient denies any abdominal pain. Review of Systems   Constitutional: Negative for fever. Gastrointestinal: Positive for nausea and vomiting. Psychiatric/Behavioral: Positive for agitation, behavioral problems, decreased concentration, dysphoric mood and sleep disturbance. Negative for hallucinations and suicidal ideas. The patient is nervous/anxious.       Past Medical History:   Diagnosis Date    Anemia     Headache     Hypothyroidism     Obesity       Past Surgical History:   Procedure Laterality Date     SECTION      COLON SURGERY      HYSTERECTOMY       Family History   Problem Relation Age of Onset    Anemia Sister     Asthma Sister     Arthritis Maternal Grandmother     Cancer Maternal Grandmother     Hearing Loss Maternal Grandmother     Miscarriages / Stillbirths Maternal Grandmother     Allergy (Severe) Maternal Grandfather     Arthritis Maternal Grandfather     Arrhythmia Maternal Grandfather     Asthma Maternal Grandfather     Coronary Art Dis Maternal Grandfather     Depression Maternal Grandfather     Diabetes Maternal Grandfather     Hearing Loss Maternal Grandfather     High Blood Pressure Maternal Grandfather     Lupus Mother      Social History     Tobacco Use    Smoking status: Never Smoker    Smokeless tobacco: Never Used   Substance Use Topics    Alcohol use: No      Current Outpatient Medications   Medication Sig Dispense Refill    famotidine (PEPCID) 20 MG tablet Take 20 mg by mouth daily       ondansetron (ZOFRAN-ODT) 4 MG disintegrating tablet Take 1 tablet by mouth 3 times daily as needed for Nausea or Vomiting 60 tablet 1    escitalopram (LEXAPRO) 5 MG/5ML solution Take 10 mLs by mouth daily 300 mL 3    clonazePAM (KLONOPIN) 0.25 MG disintegrating tablet Take 1 tablet by mouth 3 times daily as needed for Anxiety for up to 30 days.  60 tablet 0    citalopram (CELEXA) 10 MG/5ML solution Take 5 mLs by mouth daily for 7 days 5 mls for 7 days then increase to 10 mL's daily 300 mL 3    busPIRone (BUSPAR) 15 MG tablet take 1 tablet by mouth twice a day (Patient not taking: Reported on 2020) 180 tablet 1    levothyroxine (SYNTHROID) 125 MCG tablet take 1 tablet by mouth once daily (Patient not taking: Reported on 2020) 90 tablet 0    verapamil (VERELAN) 120 MG extended release capsule Take 1 capsule by mouth nightly (Patient not taking: Reported on 2020) 90 capsule 2    DULoxetine (CYMBALTA) 60 MG extended release capsule Take 1 capsule by mouth daily (Patient not taking: Reported on 8/12/2020) 30 capsule 5    VORTIoxetine (TRINTELLIX) 10 MG TABS tablet Take 1 tablet by mouth daily (Patient not taking: Reported on 8/12/2020) 30 tablet 3     No current facility-administered medications for this visit. No Known Allergies  Health Maintenance   Topic Date Due    Varicella vaccine (1 of 2 - 2-dose childhood series) 05/25/1981    HIV screen  05/25/1995    DTaP/Tdap/Td vaccine (1 - Tdap) 05/25/1999    Cervical cancer screen  05/25/2001    Flu vaccine (1) 09/01/2020    Lipid screen  02/18/2024    Hepatitis A vaccine  Aged Out    Hepatitis B vaccine  Aged Out    Hib vaccine  Aged Out    Meningococcal (ACWY) vaccine  Aged Out    Pneumococcal 0-64 years Vaccine  Aged Out         Objective:     Physical Exam  Vitals signs and nursing note reviewed. Constitutional:       Appearance: Normal appearance. HENT:      Mouth/Throat:      Pharynx: Oropharynx is clear. Neck:      Musculoskeletal: Normal range of motion. Cardiovascular:      Rate and Rhythm: Normal rate and regular rhythm. Pulses: Normal pulses. Heart sounds: Normal heart sounds. Pulmonary:      Effort: Pulmonary effort is normal.      Breath sounds: Normal breath sounds. Abdominal:      General: Bowel sounds are normal.      Palpations: Abdomen is soft. Tenderness: There is no abdominal tenderness. Neurological:      General: No focal deficit present. Mental Status: She is alert and oriented to person, place, and time. Psychiatric:         Mood and Affect: Mood is anxious. Affect is blunt and tearful. Speech: Speech is rapid and pressured. Behavior: Behavior is agitated and hyperactive. Behavior is cooperative. Thought Content: Thought content is not paranoid. Thought content does not include homicidal or suicidal ideation.          Cognition and Memory: Cognition normal.         Judgment: Judgment normal.       /62   Pulse 78   Temp 98.8 °F (37.1 °C) (Infrared)   Resp 16   Ht 5' 1.5\" (1.562 m)   Wt 217 lb (98.4 kg)   BMI 40.34 kg/m²       Impression/Plan:  1. S/P gastric surgery    2. Nausea and vomiting, intractability of vomiting not specified, unspecified vomiting type    3. Morbidly obese ([address]    4. Chronic nausea    5. Hypothyroidism, unspecified type    6. Depression, unspecified depression type    7. Situational mixed anxiety and depressive disorder    8. Moderate episode of recurrent major depressive disorder ([address]    9. Sleep disturbance      Requested Prescriptions     Signed Prescriptions Disp Refills    ondansetron (ZOFRAN-ODT) 4 MG disintegrating tablet 60 tablet 1     Sig: Take 1 tablet by mouth 3 times daily as needed for Nausea or Vomiting    escitalopram (LEXAPRO) 5 MG/5ML solution 300 mL 3     Sig: Take 10 mLs by mouth daily    clonazePAM (KLONOPIN) 0.25 MG disintegrating tablet 60 tablet 0     Sig: Take 1 tablet by mouth 3 times daily as needed for Anxiety for up to 30 days. Orders Placed This Encounter   Procedures   Rupali Lopez, Andrez Simons, [facility], Psychiatry, [facility]     Referral Priority:   Routine     Referral Type:   Eval and Treat     Referral Reason:   Specialty Services Required     Referred to Provider:   PELON Headley CNP     Requested Specialty:   Nurse Practitioner     Number of Visits Requested:   1       Patient giveneducational materials - see patient instructions. Discussed use, benefit, and side effects of prescribed medications. All patient questions answered. Pt voiced understanding. Reviewed health maintenance. Patient agreedwith treatment plan. Follow up as directed. Consult with psychiatry. Lexapro solution as well as Klonopin wafers prescribed. Oars report reviewed. Follow-up with me in 3 weeks. 25 minutes spent with patient.   Electronically signed by PELON Hernandez CNP on 8/13/2020 at 12:58 PM

## 2020-08-13 NOTE — TELEPHONE ENCOUNTER
Please just patient aware that the Lexapro was not available at that pharmacy. Consult with psych as scheduled. Klonopin wafers she would have to pay out of pocket for. If the pharmacist has any suggestions to overcome this dilemma .   Thank you

## 2020-09-14 LAB — POTASSIUM (K+): 2.9

## 2020-09-28 ENCOUNTER — TELEPHONE (OUTPATIENT)
Dept: NEPHROLOGY | Age: 40
End: 2020-09-28

## 2020-09-28 LAB — POTASSIUM (K+): 3

## 2020-09-28 NOTE — TELEPHONE ENCOUNTER
I believe this is the patient Dr. Micah Walsh called me about on Friday.    We increased potassium to 60 meq BID.     Please have her repeat a bmp early this week

## 2020-09-28 NOTE — TELEPHONE ENCOUNTER
Patient is informed. Patient wants to know if there is a tablet she can take due to the packets taste nasty.

## 2020-10-01 LAB — POTASSIUM (K+): 2.8

## 2020-10-02 ENCOUNTER — TELEPHONE (OUTPATIENT)
Dept: NEPHROLOGY | Age: 40
End: 2020-10-02

## 2020-10-02 LAB
BUN BLDV-MCNC: 11 MG/DL
CALCIUM SERPL-MCNC: 9.2 MG/DL
CHLORIDE BLD-SCNC: 104 MMOL/L
CO2: 28 MMOL/L
CREAT SERPL-MCNC: 0.8 MG/DL
GFR CALCULATED: 84
GLUCOSE BLD-MCNC: 84 MG/DL
MAGNESIUM: 2 MG/DL
POTASSIUM SERPL-SCNC: 3.6 MMOL/L
SODIUM BLD-SCNC: 139 MMOL/L
TSH SERPL DL<=0.05 MIU/L-ACNC: 0.67 UIU/ML

## 2020-10-02 NOTE — TELEPHONE ENCOUNTER
----- Message from Kevin Pelaez DO sent at 10/1/2020 11:33 AM EDT -----  Patient is getting IV potassium with Dr. Keyur Bales today I want her to get a 24 hour urine for potassium before her first appointment with me, orders are in

## 2020-10-02 NOTE — TELEPHONE ENCOUNTER
Patient notified. Finishing up IV Potassium currently today. She will complete 24 hour urine prior to next appt.  Order to be faxed to [facility].

## 2020-10-13 LAB — POTASSIUM (K+): 3.1

## 2020-10-14 ENCOUNTER — OFFICE VISIT (OUTPATIENT)
Dept: NEPHROLOGY | Age: 40
End: 2020-10-14
Payer: COMMERCIAL

## 2020-10-14 ENCOUNTER — TELEPHONE (OUTPATIENT)
Dept: NEPHROLOGY | Age: 40
End: 2020-10-14

## 2020-10-14 VITALS
TEMPERATURE: 97.4 F | OXYGEN SATURATION: 100 % | SYSTOLIC BLOOD PRESSURE: 112 MMHG | BODY MASS INDEX: 35.54 KG/M2 | HEART RATE: 68 BPM | WEIGHT: 191.2 LBS | DIASTOLIC BLOOD PRESSURE: 60 MMHG

## 2020-10-14 LAB — POTASSIUM (K+): 3.2

## 2020-10-14 PROCEDURE — 1036F TOBACCO NON-USER: CPT | Performed by: INTERNAL MEDICINE

## 2020-10-14 PROCEDURE — G8417 CALC BMI ABV UP PARAM F/U: HCPCS | Performed by: INTERNAL MEDICINE

## 2020-10-14 PROCEDURE — 99203 OFFICE O/P NEW LOW 30 MIN: CPT | Performed by: INTERNAL MEDICINE

## 2020-10-14 PROCEDURE — G8484 FLU IMMUNIZE NO ADMIN: HCPCS | Performed by: INTERNAL MEDICINE

## 2020-10-14 PROCEDURE — G8427 DOCREV CUR MEDS BY ELIG CLIN: HCPCS | Performed by: INTERNAL MEDICINE

## 2020-10-14 RX ORDER — POTASSIUM CHLORIDE 20MEQ/15ML
20 LIQUID (ML) ORAL 2 TIMES DAILY
COMMUNITY
End: 2021-01-22

## 2020-10-14 NOTE — TELEPHONE ENCOUNTER
Dr. Ama Mauricio office called and wanted to know if you were going to manage Kristin's potassium and mangesium and scheduling this.

## 2020-10-14 NOTE — TELEPHONE ENCOUNTER
Yes I have it started she stated it is $175.00 she can not afford that. Just was not clear on what was ordered.

## 2020-10-14 NOTE — PATIENT INSTRUCTIONS
Once you get your new potassium call the office with the dose and how you are tolerating it. High potassium Diet:   Foods are low in potassium. Plan to eat these every day. But don't eat more than 4 servings a day. A serving equals 1 small piece of fruit or ½ cup.  Fresh fruit: Apples, applesauce, blueberries, grapes, pineapple, plums, watermelon    Canned fruit: Peaches and pears.  Vegetables: Green or wax beans,broccoli, cabbage, carrots, corn, lettuce, radishes, green peas spinach.  Cheese, Pasta, rice, bread     Foods are high in potassium. Don't eat more than 1 serving of these a day. A serving equals 1 small piece of fruit or ½ cup.  Fresh fruit: Blackberries, boysenberries, cherries, grapefruit, strawberries   Vegetables: Asparagus, carrots, beets, corn, turnips, canned tomatoes, white mushrooms, and zucchini.  Milk and yogurt. Don't eat more than 1 cup a day.  Foods are very high in potassium. Avoid these foods.  Fruits: Apricots, bananas, dates, figs, honeydew, raisins, prunes, kiwi fruit, nectarines, oranges, and orange juice, watermelon   Vegetables: Avocados, artichokes, brussels sprouts, potatoes, leafy   green vegetables (such as spinach), winter squash, fresh tomatoes, tomato paste, yams, and dried peas, beans, and lentils.  Clams, chocolate, nuts, seeds, molasses, and sardines.       Potassium Content of Foods   (listing by high to low content)    Food  Serving Size Potassium (mg)   Baked potato (flesh only) one medium 610   Sweet potato, baked one medium 542   Banana, raw  one medium 422   Spinach, cooked ½ C 420   [place] beans, cooked ½ C 373   Kidney beans, cooked ½ C 371   Lentils, cooked ½ C 366   Navy beans, cooked ½ C 354   Plums, dried, pitted five 350   Artichokes, cooked one medium 343   Mashed potatoes ½ C 343   Edamame/soybeans, green ½ C 338   Tomato, canned  ½ C 325   Raisins ¼ C 299   Tomato, raw one medium 292   Papaya one small 286   Peach one medium 285   Pistachios, dry roasted, salted  1 oz (47 nuts) 285   Pumpkin, cooked, mashed ½ C 282   French fries 10 fries 278   Mushrooms, white, cooked ½ C 278   Beets, cooked ½ C 259   [place] sprouts, cooked ½ C 247   Kiwi one medium 237   Orange, raw one medium 237   Green peas, cooked ½ C 217   Cantaloupe, raw ½ C 214   Pear, raw one medium 212   Almonds, dry roasted 1 oz (24 nuts) 202   Apricot, canned, juice pack ½ C 202   Asparagus, cooked ½ C 202   Gage squash, cooked ½ C 201   Apple, raw with skin one medium 195   Honeydew ½ C 194   Carrots, cooked ½ C 183   Onions, cooked ½ C 175   Spinach, raw 1 C 167   Corn, sweet yellow ½ C 163   Red pryor pepper ½ C 157   Kale, cooked ½ C 150   Cabbage, cooked ½ C 147   Mustard greens, cooked ½ C 142   [place] ½ C 139   Figs, dried two figs 134   Summer squash, cooked ½ C 126   Grapes 10 grapes 120   Okra, cooked ½ C 108   Bamboo shoots, canned 1 C 108   Celery, raw one stalk 105   Peanut butter, creamy 1 Tbsp 104   Green beans, cooked ½ C 104   Cauliflower, cooked ½ C 91   Mushrooms, shitake, cooked ½ C 88   Watermelon ½ C 85   Cucumber, peeled ½ C 88   Iceberg lettuce 1 C 81   Tomato, sun dried one piece 80   Eggplant, cooked ½ C 69   Pickle one pickle 61   Ketchup 1 Tbsp 60   Radishes one radish 57     5   C=cup, mg=milligrams, oz=ounces, Tbsp=tablespoon    Reference  [place] Dept of Agriculture, Agricultural Research Service, [employer]  [place] [employer] Database for Standard Reference, Release 25: Potassium, K (mg) content of selected foods per common measure

## 2020-10-14 NOTE — TELEPHONE ENCOUNTER
Start her on Effer-K 25 meq TID. May need higher dose depending on how her potassium levels are with it. We need to get it appealed because she can not take other potassium formulations due to her gastric bypass surgery and she is not tolerating the liquid or packet.

## 2020-10-14 NOTE — PROGRESS NOTES
[address] [facility]  [address]  Dept: [phone]  Loc: [phone]  Outpatient Consult  [phone]  10/14/2020 8:39 AM EDT        Pt Name:    Richie Odor:    1980  Primary Care Physician:  Lizz Ge, PELON - CNP     Chief Complaint:   Chief Complaint   Patient presents with   [place] [facility] New Patient     Ref Gera Hassan         Background Information/Interval History:   The patient is a very pleasant 36y.o. year old female referred by Dr. Gera Hassan for hypokalemia. Patient has hx of obesity and underwent laparoscopic sleeve gastrectomy in July. Since then she has been having issues with hypokalemia. She says for a month after the surgery she was not able to keep any medications down and had extreme nausea. She was off all of her mental health medications and was not doing very well for about a month after but she is doing better now. The only medicine she currently is taking is her Synthroid and her potassium. She has been on liquid potassium in the past but did not tolerate it due to the taste. Currently she is taking a potassium chloride packet solution 20 mEq twice daily. She states this is all she is able to tolerate she is unable to take more than that and sometimes she does not even get that much in. Her potassium levels are still running low as low as 2.9 into the low threes and she is requiring IV infusions of potassium 2-3 times per week. She has a PICC line for this. She does report muscle spasms and cramps especially in her hands. In review of previous labs in 2017 and 2018 her potassium levels were normal.  She states in May prior to the surgery her potassium was low end of normal at 3.4. There is no history of hypertension. She is not having diarrhea. In fact she only has 2 bowel movements a week. She is no longer having any nausea or vomiting.   She is unable to take extended release past potassium due to the Gastric sleeve surgery. Her diet is limited in what she can eat as well to the surgery. She denies any known family history of hypokalemia. Her magnesium levels have been normal.  TSH is within normal limits. 24-hour urine for potassium has been ordered which she will  and do this week. Potassium effervescent was sent to her pharmacy to see if she tolerates this better but she has not received it yet. She has lost 50 pounds since the surgery. Also has hx of migraines, depression, anxiety, hypothyroidism. Allergies:  Patient has no known allergies.         Past Medical History:  Past Medical History:   Diagnosis Date    Anemia     Headache     Hypothyroidism     Obesity         Past Surgical History:  Past Surgical History:   Procedure Laterality Date     SECTION      COLON SURGERY      HYSTERECTOMY          Family History:  Family History   Problem Relation Age of Onset    Anemia Sister     Asthma Sister     Arthritis Maternal Grandmother     Cancer Maternal Grandmother     Hearing Loss Maternal Grandmother     Miscarriages / Stillbirths Maternal Grandmother     Allergy (Severe) Maternal Grandfather     Arthritis Maternal Grandfather     Arrhythmia Maternal Grandfather     Asthma Maternal Grandfather     Coronary Art Dis Maternal Grandfather     Depression Maternal Grandfather     Diabetes Maternal Grandfather     Hearing Loss Maternal Grandfather     High Blood Pressure Maternal Grandfather     Lupus Mother         Social History:  Social History     Socioeconomic History    Marital status: Single     Spouse name: Not on file    Number of children: Not on file    Years of education: Not on file    Highest education level: Not on file   Occupational History    Not on file   Social Needs    Financial resource strain: Not on file    Food insecurity     Worry: Not on file     Inability: Not on file   [employer] needs     Medical: Not on file     Non-medical: Not on file   Tobacco Use    Smoking status: Never Smoker    Smokeless tobacco: Never Used   Substance and Sexual Activity    Alcohol use: No    Drug use: No    Sexual activity: Yes     Partners: Male   Lifestyle    Physical activity     Days per week: Not on file     Minutes per session: Not on file    Stress: Not on file   Relationships    Social connections     Talks on phone: Not on file     Gets together: Not on file     Attends [employer] service: Not on file     Active member of club or organization: Not on file     Attends meetings of clubs or organizations: Not on file     Relationship status: Not on file    Intimate partner violence     Fear of current or ex partner: Not on file     Emotionally abused: Not on file     Physically abused: Not on file     Forced sexual activity: Not on file   Other Topics Concern    Not on file   Social History Narrative    Not on file        Review of Systems:  Constitutional: no fever or chills  Head: No headaches  Eyes: no blurry vision, no discharge  Ears: no ear pain or hearing changes  Nose: no runny nose or epistaxis  Respiratory: no shortness of breath or cough or sputum production  Cardiovascular: no chest pain, no edema  GI: no nausea, no vomiting or diarrhea  : denies any hematuria, no flank pain  Skin: no rash  Musculoskeletal: no joint pain, moves all ext  Neuro: no tremor, no slurred speech  Psychiatric: stable mood, no depression or insomnia     Home Medications:  Prior to Admission medications    Medication Sig Start Date End Date Taking?  Authorizing Provider   levothyroxine (SYNTHROID) 125 MCG tablet take 1 tablet by mouth once daily 4/13/20  Yes PELON Mullen CNP   citalopram (CELEXA) 10 MG/5ML solution Take 5 mLs by mouth daily for 7 days 5 mls for 7 days then increase to 10 mL's daily  Patient not taking: Reported on 10/14/2020 8/13/20 8/20/20  PELON Mullen CNP   famotidine (PEPCID) 20 MG tablet Take 20 mg by mouth daily  8/6/20   Historical Provider, MD   ondansetron (ZOFRAN-ODT) 4 MG disintegrating tablet Take 1 tablet by mouth 3 times daily as needed for Nausea or Vomiting  Patient not taking: Reported on 10/14/2020 8/12/20   Kenney Sample, APRN - CNP   escitalopram (LEXAPRO) 5 MG/5ML solution Take 10 mLs by mouth daily  Patient not taking: Reported on 10/14/2020 8/12/20   Kenney Sample, APRN - CNP   clonazePAM (KLONOPIN) 0.25 MG disintegrating tablet Take 1 tablet by mouth 3 times daily as needed for Anxiety for up to 30 days. Patient not taking: Reported on 10/14/2020 8/12/20 9/11/20  Kenney Sample, APRN - CNP   busPIRone (BUSPAR) 15 MG tablet take 1 tablet by mouth twice a day  Patient not taking: Reported on 8/12/2020 4/13/20   Kenney Sample, APRN - CNP   verapamil (VERELAN) 120 MG extended release capsule Take 1 capsule by mouth nightly  Patient not taking: Reported on 8/12/2020 1/27/20   Kenney Sample, APRN - CNP   DULoxetine (CYMBALTA) 60 MG extended release capsule Take 1 capsule by mouth daily  Patient not taking: Reported on 8/12/2020 1/27/20   Kenney Sample, APRN - CNP   VORTIoxetine (TRINTELLIX) 10 MG TABS tablet Take 1 tablet by mouth daily  Patient not taking: Reported on 8/12/2020 1/27/20   Kenney Sample, APRN - CNP        Physical Examination:  VITALS:  /60 (Site: Left Upper Arm, Position: Sitting, Cuff Size: Large Adult)   Pulse 68   Temp 97.4 °F (36.3 °C) (Temporal)   Wt 191 lb 3.2 oz (86.7 kg)   SpO2 100%   BMI 35.54 kg/m²   Body mass index is 35.54 kg/m². Wt Readings from Last 3 Encounters:   10/14/20 191 lb 3.2 oz (86.7 kg)   08/12/20 217 lb (98.4 kg)   01/27/20 232 lb 12.8 oz (105.6 kg)     Constitutional and General Appearance: alert and cooperative with exam, appears comfortable, no distress  Eyes: no icteric sclera, no pallor conjunctiva, no discharge seen from either eye  Ears and Nose: normal external appearance of left and right ear and nose. RBCUA 3-5 07/13/2018    MUCUS Present 07/13/2018    LEUKOCYTESUR Negative 07/13/2018    UROBILINOGEN <2.0 E.U./dL 07/13/2018    BILIRUBINUR Negative 07/13/2018    GLUCOSEU Negative 07/13/2018    KETUA Negative 07/13/2018      Microalbumen/Creatinine ratio:  No components found for: RUCREAT        Impression/Plan:   1. Hypokalemia: severe since gastric sleeve surgery. ? GI related. Will check 24 hour urine to evaluate for any renal potassium wasting and may consider starting spironolactone. She is requiring potassium infusions 2-3 times per week. She has not found a potassium formulation that she is able to tolerate very well. Unable to take extended release due to surgery. We will try Effer-K, pt has prescription waiting she will call and let me know the dose and how she is tolerating it. If tolerates we will increase the dose as much as tolerated and hopefully be able to stop the IV infusions. She will get weekly potassium and Magnesium levels drawn on Monday. We discussed diet and potassium containing foods and what she can and cannot eat. A list was provided to the patient as well. 2. Obesity s/p gastric sleeve surgery  3. Depression  4. Hypothyroidism    Return in 1 month. Thought process was discussed with the patient.   Thank you for the referral.  Please do not hesitate to contact me if you have any questions or concerns        Ashtyn Amador, DO  [facility]

## 2020-10-19 PROBLEM — E03.9 HYPOTHYROIDISM, UNSPECIFIED: Status: ACTIVE | Noted: 2020-10-19

## 2020-10-19 PROBLEM — M54.50 LOW BACK PAIN: Status: ACTIVE | Noted: 2020-10-19

## 2020-10-19 PROBLEM — R09.A2 SENSATION OF LUMP IN THROAT: Status: ACTIVE | Noted: 2020-10-19

## 2020-10-19 PROBLEM — M25.579 ANKLE PAIN: Status: ACTIVE | Noted: 2020-10-19

## 2020-10-19 PROBLEM — G47.00 INSOMNIA: Status: ACTIVE | Noted: 2020-10-19

## 2020-10-19 PROBLEM — Z79.899 OTHER LONG TERM (CURRENT) DRUG THERAPY: Status: ACTIVE | Noted: 2020-10-19

## 2020-10-19 PROBLEM — R73.9 BLOOD GLUCOSE ELEVATED: Status: ACTIVE | Noted: 2020-10-19

## 2020-10-19 PROBLEM — Z12.4 SCREENING FOR MALIGNANT NEOPLASM OF CERVIX: Status: ACTIVE | Noted: 2020-10-19

## 2020-10-19 PROBLEM — T78.40XA ALLERGIC STATE: Status: ACTIVE | Noted: 2020-10-19

## 2020-10-19 PROBLEM — R20.2 PARESTHESIA: Status: ACTIVE | Noted: 2020-10-19

## 2020-10-19 PROBLEM — F41.9 ANXIETY: Status: ACTIVE | Noted: 2020-10-19

## 2020-10-19 PROBLEM — Z98.84 HISTORY OF BARIATRIC SURGERY: Status: ACTIVE | Noted: 2020-10-19

## 2020-10-19 PROBLEM — Z79.899 POLYPHARMACY: Status: ACTIVE | Noted: 2020-10-19

## 2020-10-19 PROBLEM — L08.9 LOCAL INFECTION OF WOUND: Status: ACTIVE | Noted: 2020-10-19

## 2020-10-19 PROBLEM — R22.1 SENSATION OF LUMP IN THROAT: Status: ACTIVE | Noted: 2020-10-19

## 2020-10-19 PROBLEM — E78.2 MIXED HYPERLIPIDEMIA: Status: ACTIVE | Noted: 2020-10-19

## 2020-10-19 PROBLEM — G43.009 MIGRAINE WITHOUT AURA AND RESPONSIVE TO TREATMENT: Status: ACTIVE | Noted: 2020-10-19

## 2020-10-19 PROBLEM — R05.9 COUGH: Status: ACTIVE | Noted: 2020-10-19

## 2020-10-19 PROBLEM — R52 GENERALIZED PAIN: Status: ACTIVE | Noted: 2020-10-19

## 2020-10-19 PROBLEM — E87.6 HYPOKALEMIA: Status: ACTIVE | Noted: 2020-10-19

## 2020-10-19 PROBLEM — L98.9 DISORDER OF SKIN AND SUBCUTANEOUS TISSUE: Status: ACTIVE | Noted: 2020-10-19

## 2020-10-19 PROBLEM — Z01.419 ENCOUNTER FOR GYNECOLOGICAL EXAMINATION WITHOUT ABNORMAL FINDING: Status: ACTIVE | Noted: 2020-10-19

## 2020-10-19 PROBLEM — Z86.69 HISTORY OF MIGRAINE: Status: ACTIVE | Noted: 2020-10-19

## 2020-10-19 PROBLEM — Z87.891 EX-CIGARETTE SMOKER: Status: ACTIVE | Noted: 2020-10-19

## 2020-10-19 PROBLEM — K91.2 POSTSURGICAL MALABSORPTION, NOT ELSEWHERE CLASSIFIED: Status: ACTIVE | Noted: 2020-10-19

## 2020-10-19 PROBLEM — K21.9 GASTROESOPHAGEAL REFLUX DISEASE: Status: ACTIVE | Noted: 2020-10-19

## 2020-10-19 PROBLEM — N76.0 ACUTE VAGINITIS: Status: ACTIVE | Noted: 2020-10-19

## 2020-10-19 PROBLEM — K80.20 GALLSTONE: Status: ACTIVE | Noted: 2020-10-19

## 2020-10-19 PROBLEM — E03.4 ACQUIRED ATROPHY OF THYROID: Status: ACTIVE | Noted: 2020-10-19

## 2020-10-19 PROBLEM — D48.5 NEOPLASM OF UNCERTAIN BEHAVIOR OF SKIN: Status: ACTIVE | Noted: 2020-10-19

## 2020-10-19 PROBLEM — K76.9 DISEASE OF LIVER: Status: ACTIVE | Noted: 2020-10-19

## 2020-10-19 PROBLEM — K59.00 ACUTE CONSTIPATION: Status: ACTIVE | Noted: 2020-10-19

## 2020-10-19 PROBLEM — R63.4 WEIGHT LOSS: Status: ACTIVE | Noted: 2020-10-19

## 2020-10-19 PROBLEM — N30.00 ACUTE CYSTITIS: Status: ACTIVE | Noted: 2020-10-19

## 2020-10-19 PROBLEM — R10.32 LEFT LOWER QUADRANT PAIN: Status: ACTIVE | Noted: 2020-10-19

## 2020-10-19 PROBLEM — R25.2 HAND CRAMPS: Status: ACTIVE | Noted: 2020-10-19

## 2020-10-19 PROBLEM — L70.9 ACNE: Status: ACTIVE | Noted: 2020-10-19

## 2020-10-19 PROBLEM — J02.0 STREPTOCOCCAL SORE THROAT: Status: ACTIVE | Noted: 2020-10-19

## 2020-10-19 PROBLEM — L04.9 ACUTE LYMPHADENITIS: Status: ACTIVE | Noted: 2020-10-19

## 2020-10-19 PROBLEM — N91.2 AMENORRHEA: Status: ACTIVE | Noted: 2020-10-19

## 2020-10-19 PROBLEM — F41.8 MIXED ANXIETY DEPRESSIVE DISORDER: Status: ACTIVE | Noted: 2020-10-19

## 2020-10-19 PROBLEM — M25.559 ARTHRALGIA OF HIP: Status: ACTIVE | Noted: 2020-10-19

## 2020-10-19 PROBLEM — T14.8XXA LOCAL INFECTION OF WOUND: Status: ACTIVE | Noted: 2020-10-19

## 2020-10-19 RX ORDER — POTASSIUM BICARBONATE 25 MEQ/1
25 TABLET, EFFERVESCENT ORAL 3 TIMES DAILY
Qty: 270 TABLET | Refills: 3 | Status: SHIPPED | OUTPATIENT
Start: 2020-10-19 | End: 2020-11-06 | Stop reason: SDUPTHER

## 2020-10-21 LAB
MAGNESIUM: 1.9 MG/DL
POTASSIUM (K+): 3

## 2020-10-25 ENCOUNTER — HOSPITAL ENCOUNTER (OUTPATIENT)
Age: 40
Discharge: HOME | End: 2020-10-25
Payer: COMMERCIAL

## 2020-10-25 PROCEDURE — 82570 ASSAY OF URINE CREATININE: CPT

## 2020-10-25 PROCEDURE — 84133 ASSAY OF URINE POTASSIUM: CPT

## 2020-10-25 PROCEDURE — 84300 ASSAY OF URINE SODIUM: CPT

## 2020-10-26 ENCOUNTER — TELEPHONE (OUTPATIENT)
Dept: NEPHROLOGY | Age: 40
End: 2020-10-26

## 2020-10-26 LAB
BASOPHILS ABSOLUTE: NORMAL
BASOPHILS RELATIVE PERCENT: NORMAL
BUN BLDV-MCNC: 10 MG/DL
CALCIUM SERPL-MCNC: 9.2 MG/DL
CHLORIDE BLD-SCNC: 101 MMOL/L
CO2: 30 MMOL/L
CREAT SERPL-MCNC: 0.8 MG/DL
EOSINOPHILS ABSOLUTE: NORMAL
EOSINOPHILS RELATIVE PERCENT: NORMAL
GFR CALCULATED: 84
GLUCOSE BLD-MCNC: 87 MG/DL
HCT VFR BLD CALC: 38.5 % (ref 36–46)
HEMOGLOBIN: 12.7 G/DL (ref 12–16)
LYMPHOCYTES ABSOLUTE: NORMAL
LYMPHOCYTES RELATIVE PERCENT: NORMAL
MAGNESIUM: 2 MG/DL
MCH RBC QN AUTO: NORMAL PG
MCHC RBC AUTO-ENTMCNC: NORMAL G/DL
MCV RBC AUTO: NORMAL FL
MONOCYTES ABSOLUTE: NORMAL
MONOCYTES RELATIVE PERCENT: NORMAL
NEUTROPHILS ABSOLUTE: NORMAL
NEUTROPHILS RELATIVE PERCENT: NORMAL
PLATELET # BLD: 269 K/ΜL
PMV BLD AUTO: NORMAL FL
POTASSIUM (K+): 2.8
POTASSIUM SERPL-SCNC: 2.8 MMOL/L
RBC # BLD: 4.14 10^6/ΜL
SODIUM BLD-SCNC: 140 MMOL/L
WBC # BLD: 6.12 10^3/ML

## 2020-10-26 NOTE — TELEPHONE ENCOUNTER
Patient had IV potassium last Wednesday. We have a potassium and mag resulted on 10.21.20. She is scheduled to get more potassium today at 10:30, Wednesday and Friday at [address] [place] [address]. I will ask patient if she is getting them drawn 3x a week and if she is taking potassium orally.

## 2020-10-27 PROCEDURE — 81050 URINALYSIS VOLUME MEASURE: CPT

## 2020-10-28 ENCOUNTER — TELEPHONE (OUTPATIENT)
Dept: NEPHROLOGY | Age: 40
End: 2020-10-28

## 2020-10-28 LAB
CREATININE URINE: 196.2 MG/DL
CREATININE URINE: 2 GM/24HR
HOURS COLLECTED: 24 HRS
POTASSIUM (K+): 3.1
POTASSIUM 24 HOUR URINE: 50.7 MEQ/24HR (ref 40–80)
POTASSIUM, URINE: 50.7 MEQ/L
SODIUM URINE: 78 MEQ/24HR (ref 75–200)
SODIUM URINE: 78 MEQ/L
URINE VOLUME, 24 HOUR: 1000 ML

## 2020-10-28 NOTE — TELEPHONE ENCOUNTER
Did we ever get in touch with patient about the Effer-K, if she is tolerating it and how much she is taking

## 2020-10-29 RX ORDER — SPIRONOLACTONE 25 MG/1
25 TABLET ORAL DAILY
Qty: 30 TABLET | Refills: 5 | Status: SHIPPED | OUTPATIENT
Start: 2020-10-29 | End: 2020-11-06 | Stop reason: DRUGHIGH

## 2020-10-30 ENCOUNTER — TELEPHONE (OUTPATIENT)
Dept: NEPHROLOGY | Age: 40
End: 2020-10-30

## 2020-10-30 LAB — POTASSIUM (K+): 3.2

## 2020-10-30 NOTE — TELEPHONE ENCOUNTER
Matt Wheeler from [facility] called and wants to know how long do you want to do the Potassium Infusions? They're asking because they don't know how far out to schedule her?

## 2020-11-02 LAB
MAGNESIUM: 2.1 MG/DL
POTASSIUM (K+): 3.3

## 2020-11-03 ENCOUNTER — TELEPHONE (OUTPATIENT)
Dept: NEPHROLOGY | Age: 40
End: 2020-11-03

## 2020-11-03 NOTE — TELEPHONE ENCOUNTER
Potassium is improved.  I started spironolactone but now I'm not seeing my note to her about it. Can you confirm she got it?  Let's move her infusions back to 2 days a week, Mondays and Thursdays and see how her potassium does with that.

## 2020-11-06 ENCOUNTER — TELEPHONE (OUTPATIENT)
Dept: NEPHROLOGY | Age: 40
End: 2020-11-06

## 2020-11-06 LAB — POTASSIUM (K+): 3.1

## 2020-11-06 RX ORDER — SPIRONOLACTONE 25 MG/1
12.5 TABLET ORAL DAILY
Qty: 30 TABLET | Refills: 5 | Status: SHIPPED
Start: 2020-11-06 | End: 2020-12-30 | Stop reason: SINTOL

## 2020-11-06 RX ORDER — POTASSIUM BICARBONATE 25 MEQ/1
50 TABLET, EFFERVESCENT ORAL 3 TIMES DAILY
Qty: 540 TABLET | Refills: 3 | Status: SHIPPED | OUTPATIENT
Start: 2020-11-06 | End: 2021-06-02 | Stop reason: SDUPTHER

## 2020-11-06 NOTE — TELEPHONE ENCOUNTER
Spoke to patient. She is taking the Spironolactone, however believes it is making her sick. She is very nauseated, lightheaded and has a headache after taking them. She is currently receiveing transfusions 2 x weekly. The bariatric doctor stated she was dehydrated yesterday and gave her fluids. She is taking the Effer-k as ordered however is not going to lie. ..she is not taking the KCL. She stated honestly her fluid intake hasn't been well either since she is so nauseated. Please advise.

## 2020-11-06 NOTE — TELEPHONE ENCOUNTER
Ok have her cut the spironolactone in half and take 12.5 mg daily. If still has those symptoms on half the dose then stop altogether. Can she double the amount of Effer-K she is taking?

## 2020-11-11 LAB — POTASSIUM (K+): 3.5

## 2020-11-17 LAB — POTASSIUM (K+): 3.1

## 2020-11-18 ENCOUNTER — OFFICE VISIT (OUTPATIENT)
Dept: NEPHROLOGY | Age: 40
End: 2020-11-18
Payer: COMMERCIAL

## 2020-11-18 ENCOUNTER — HOSPITAL ENCOUNTER (OUTPATIENT)
Age: 40
Discharge: HOME | End: 2020-11-18
Payer: COMMERCIAL

## 2020-11-18 VITALS
OXYGEN SATURATION: 99 % | BODY MASS INDEX: 33.31 KG/M2 | WEIGHT: 179.2 LBS | TEMPERATURE: 97.5 F | HEART RATE: 65 BPM | DIASTOLIC BLOOD PRESSURE: 66 MMHG | SYSTOLIC BLOOD PRESSURE: 99 MMHG

## 2020-11-18 PROBLEM — Z12.4 SCREENING FOR MALIGNANT NEOPLASM OF CERVIX: Status: RESOLVED | Noted: 2020-10-19 | Resolved: 2020-11-18

## 2020-11-18 PROBLEM — Z01.419 ENCOUNTER FOR GYNECOLOGICAL EXAMINATION WITHOUT ABNORMAL FINDING: Status: RESOLVED | Noted: 2020-10-19 | Resolved: 2020-11-18

## 2020-11-18 PROBLEM — R05.9 COUGH: Status: RESOLVED | Noted: 2020-10-19 | Resolved: 2020-11-18

## 2020-11-18 PROCEDURE — 36415 COLL VENOUS BLD VENIPUNCTURE: CPT

## 2020-11-18 PROCEDURE — G8484 FLU IMMUNIZE NO ADMIN: HCPCS | Performed by: INTERNAL MEDICINE

## 2020-11-18 PROCEDURE — G8417 CALC BMI ABV UP PARAM F/U: HCPCS | Performed by: INTERNAL MEDICINE

## 2020-11-18 PROCEDURE — 84244 ASSAY OF RENIN: CPT

## 2020-11-18 PROCEDURE — 1036F TOBACCO NON-USER: CPT | Performed by: INTERNAL MEDICINE

## 2020-11-18 PROCEDURE — 99213 OFFICE O/P EST LOW 20 MIN: CPT | Performed by: INTERNAL MEDICINE

## 2020-11-18 PROCEDURE — 82088 ASSAY OF ALDOSTERONE: CPT

## 2020-11-18 PROCEDURE — G8427 DOCREV CUR MEDS BY ELIG CLIN: HCPCS | Performed by: INTERNAL MEDICINE

## 2020-11-18 NOTE — PROGRESS NOTES
[address] [facility]  [address]  [facility] [address]  Dept: [phone]  Loc: [phone]  Progress Note  2020 9:12 AM      Pt Name:    Alen Curtis:    1980  Primary Care Physician:  Samy Garibay, APRN - CNP     Chief Complaint:   Chief Complaint   Patient presents with    Follow-up     hypokalemia        History of Present Illness: This is a follow-up visit for hypokalemia. Patient was first seen 1 month ago. She has hx of hypokalemia since gastric sleeve surgery in July. Currently on Effer-K 25 meq TID, she tries to take it more often if she can but she has a lot of nausea which limits how much she can take. + nausea, no vomiting or diarrhea. She is getting potassium infusions twice a week. 24 hour urine for potassium was high at 50 meq. She was started on aldactone 25 mg daily but felt weak and nauseated from it so we reduced to 12.5 mg daily and she is feeling better. Bps are low has occasional dizziness but overall feels ok besides continued nausea. Had COVID 2 weeks ago. Pertinent items are noted in HPI.          Past History:  Past Medical History:   Diagnosis Date    Anemia     Disease of liver 10/19/2020    Gastroesophageal reflux disease 10/19/2020    Headache     Hypothyroidism     Low back pain 10/19/2020    Mixed anxiety depressive disorder 10/19/2020    Mixed hyperlipidemia 10/19/2020    Obesity      Past Surgical History:   Procedure Laterality Date     SECTION      COLON SURGERY      HYSTERECTOMY          VITALS:  BP 99/66 (Site: Left Upper Arm, Position: Sitting, Cuff Size: Large Adult)   Pulse 65   Temp 97.5 °F (36.4 °C) (Temporal)   Wt 179 lb 3.2 oz (81.3 kg)   SpO2 99%   BMI 33.31 kg/m²   Wt Readings from Last 3 Encounters:   20 179 lb 3.2 oz (81.3 kg)   10/14/20 191 lb 3.2 oz (86.7 kg)   20 217 lb (98.4 kg)     Body mass index is 33.31 kg/m². General Appearance: alert and cooperative with exam, appears comfortable, no distress  HEENT: EOMI, moist oral mucus membranes  Neck: No jugular venous distention,   Lungs: Air entry B/L, no crackles or rales, no use of accessory muscles  Heart: S1, S2 heard, no rub  GI: soft, non-tender, no guarding, no flank pain  Extremities: No sig LE edema, no cyanosis  Skin: warm, dry  Neurologic: no tremor, no asterixis, no focal neurologic deficits     Medications:  Current Outpatient Medications   Medication Sig Dispense Refill    potassium bicarbonate (EFFER-K) 25 MEQ disintegrating tablet Take 2 tablets by mouth 3 times daily 540 tablet 3    spironolactone (ALDACTONE) 25 MG tablet Take 0.5 tablets by mouth daily 30 tablet 5    busPIRone (BUSPAR) 15 MG tablet take 1 tablet by mouth twice a day 180 tablet 1    levothyroxine (SYNTHROID) 125 MCG tablet take 1 tablet by mouth once daily 90 tablet 0    potassium chloride 20 MEQ/15ML (10%) oral solution Take 20 mEq by mouth 2 times daily      citalopram (CELEXA) 10 MG/5ML solution Take 5 mLs by mouth daily for 7 days 5 mls for 7 days then increase to 10 mL's daily (Patient not taking: Reported on 10/14/2020) 300 mL 3    famotidine (PEPCID) 20 MG tablet Take 20 mg by mouth daily       ondansetron (ZOFRAN-ODT) 4 MG disintegrating tablet Take 1 tablet by mouth 3 times daily as needed for Nausea or Vomiting (Patient not taking: Reported on 10/14/2020) 60 tablet 1    escitalopram (LEXAPRO) 5 MG/5ML solution Take 10 mLs by mouth daily (Patient not taking: Reported on 10/14/2020) 300 mL 3    clonazePAM (KLONOPIN) 0.25 MG disintegrating tablet Take 1 tablet by mouth 3 times daily as needed for Anxiety for up to 30 days.  (Patient not taking: Reported on 10/14/2020) 60 tablet 0    verapamil (VERELAN) 120 MG extended release capsule Take 1 capsule by mouth nightly (Patient not taking: Reported on 8/12/2020) 90 capsule 2    DULoxetine (CYMBALTA) 60 MG extended release capsule Take 1 capsule by mouth daily (Patient not taking: Reported on 8/12/2020) 30 capsule 5    VORTIoxetine (TRINTELLIX) 10 MG TABS tablet Take 1 tablet by mouth daily (Patient not taking: Reported on 8/12/2020) 30 tablet 3     No current facility-administered medications for this visit.          Laboratory & Diagnostics:  CBC:   Lab Results   Component Value Date    WBC 6.12 10/26/2020    HGB 12.7 10/26/2020    HCT 38.5 10/26/2020    MCV 95.3 02/18/2019     10/26/2020     BMP:    Lab Results   Component Value Date     10/26/2020     10/02/2020     02/18/2019    K 3.1 11/17/2020    K 3.5 11/11/2020    K 3.1 11/06/2020     10/26/2020     10/02/2020     02/18/2019    CO2 30 10/26/2020    CO2 28 10/02/2020    CO2 23 02/18/2019    BUN 10 10/26/2020    BUN 11 10/02/2020    BUN 16 02/18/2019    CREATININE 0.8 10/26/2020    CREATININE 0.8 10/02/2020    CREATININE 0.7 02/18/2019    GLUCOSE 87 10/26/2020    GLUCOSE 84 10/02/2020    GLUCOSE 93 02/18/2019      Hepatic:   Lab Results   Component Value Date    AST 19 02/18/2019    AST 26 07/13/2018    AST 20 09/01/2017    ALT 13 02/18/2019    ALT 24 07/13/2018    ALT 12 09/01/2017    BILITOT 0.3 02/18/2019    BILITOT 0.4 07/13/2018    BILITOT 0.4 09/01/2017    ALKPHOS 27 (L) 02/18/2019    ALKPHOS 20 (L) 07/13/2018    ALKPHOS 24 (L) 09/01/2017     BNP: No results found for: BNP  Lipids:   Lab Results   Component Value Date    CHOL 201 (H) 02/18/2019    HDL 50 02/18/2019     INR:   Lab Results   Component Value Date    INR 1.05 07/13/2018     URINE:   Lab Results   Component Value Date    NAUR 78 10/25/2020    NAUR 78 10/25/2020    PROTUR Negative 07/13/2018     Lab Results   Component Value Date    NITRU Negative 07/13/2018    COLORU Yellow 07/13/2018    WBCUA 0-5 07/13/2018    RBCUA 3-5 07/13/2018    MUCUS Present 07/13/2018    LEUKOCYTESUR Negative 07/13/2018    UROBILINOGEN <2.0 E.U./dL 07/13/2018    BILIRUBINUR Negative 07/13/2018

## 2020-11-20 LAB
ALDOSTERONE: 8.7 NG/DL
MAGNESIUM: 2.1 MG/DL
POTASSIUM (K+): 3.2

## 2020-11-22 LAB — RENIN ACTIVITY: 1.8 NG/ML/HR

## 2020-11-24 LAB
MAGNESIUM: 2.2 MG/DL
POTASSIUM (K+): 3.1

## 2020-11-24 RX ORDER — LEVOTHYROXINE SODIUM 0.12 MG/1
TABLET ORAL
Qty: 90 TABLET | Refills: 0 | Status: SHIPPED | OUTPATIENT
Start: 2020-11-24 | End: 2021-05-10

## 2020-11-27 LAB — MAGNESIUM: 2.3 MG/DL

## 2020-12-03 LAB
MAGNESIUM: 2.2 MG/DL
POTASSIUM (K+): 3

## 2020-12-08 LAB
MAGNESIUM: 2.3 MG/DL
POTASSIUM (K+): 3.4

## 2020-12-09 ENCOUNTER — VIRTUAL VISIT (OUTPATIENT)
Dept: PSYCHIATRY | Age: 40
End: 2020-12-09
Payer: COMMERCIAL

## 2020-12-09 ENCOUNTER — TELEPHONE (OUTPATIENT)
Dept: PSYCHIATRY | Age: 40
End: 2020-12-09

## 2020-12-09 PROCEDURE — 90792 PSYCH DIAG EVAL W/MED SRVCS: CPT | Performed by: NURSE PRACTITIONER

## 2020-12-09 PROCEDURE — 1036F TOBACCO NON-USER: CPT | Performed by: NURSE PRACTITIONER

## 2020-12-09 RX ORDER — HYDROXYZINE PAMOATE 25 MG/1
25 CAPSULE ORAL 3 TIMES DAILY PRN
Qty: 90 CAPSULE | Refills: 0 | Status: SHIPPED | OUTPATIENT
Start: 2020-12-09 | End: 2021-01-22 | Stop reason: SINTOL

## 2020-12-09 RX ORDER — VENLAFAXINE 37.5 MG/1
37.5 TABLET ORAL 2 TIMES DAILY
Qty: 60 TABLET | Refills: 1 | Status: SHIPPED | OUTPATIENT
Start: 2020-12-09 | End: 2021-01-22 | Stop reason: SINTOL

## 2020-12-09 SDOH — ECONOMIC STABILITY: TRANSPORTATION INSECURITY
IN THE PAST 12 MONTHS, HAS THE LACK OF TRANSPORTATION KEPT YOU FROM MEDICAL APPOINTMENTS OR FROM GETTING MEDICATIONS?: NO

## 2020-12-09 SDOH — ECONOMIC STABILITY: TRANSPORTATION INSECURITY
IN THE PAST 12 MONTHS, HAS LACK OF TRANSPORTATION KEPT YOU FROM MEETINGS, WORK, OR FROM GETTING THINGS NEEDED FOR DAILY LIVING?: NO

## 2020-12-09 SDOH — ECONOMIC STABILITY: FOOD INSECURITY: WITHIN THE PAST 12 MONTHS, THE FOOD YOU BOUGHT JUST DIDN'T LAST AND YOU DIDN'T HAVE MONEY TO GET MORE.: NEVER TRUE

## 2020-12-09 SDOH — ECONOMIC STABILITY: INCOME INSECURITY: HOW HARD IS IT FOR YOU TO PAY FOR THE VERY BASICS LIKE FOOD, HOUSING, MEDICAL CARE, AND HEATING?: NOT HARD AT ALL

## 2020-12-09 SDOH — ECONOMIC STABILITY: FOOD INSECURITY: WITHIN THE PAST 12 MONTHS, YOU WORRIED THAT YOUR FOOD WOULD RUN OUT BEFORE YOU GOT MONEY TO BUY MORE.: NEVER TRUE

## 2020-12-09 NOTE — Clinical Note
Please schedule patient with Dr. Mt Choi for psychotherapy. Case was previously reviewed with Dr. Mt Choi for approval. Thank you!
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
## 2020-12-11 LAB
MAGNESIUM: 2.2 MG/DL
POTASSIUM (K+): 3.1

## 2020-12-15 LAB
MAGNESIUM: 2.2 MG/DL
POTASSIUM (K+): 3.1

## 2020-12-18 ENCOUNTER — TELEPHONE (OUTPATIENT)
Dept: NEPHROLOGY | Age: 40
End: 2020-12-18

## 2020-12-18 ENCOUNTER — PATIENT MESSAGE (OUTPATIENT)
Dept: NEPHROLOGY | Age: 40
End: 2020-12-18

## 2020-12-18 LAB
BUN BLDV-MCNC: 14 MG/DL
CALCIUM SERPL-MCNC: 9.5 MG/DL
CHLORIDE BLD-SCNC: 102 MMOL/L
CO2: 23 MMOL/L
CREAT SERPL-MCNC: 0.9 MG/DL
GFR CALCULATED: 74
GLUCOSE BLD-MCNC: 87 MG/DL
MAGNESIUM: 2.1 MG/DL
POTASSIUM (K+): 3.2
POTASSIUM SERPL-SCNC: 3.3 MMOL/L
SODIUM BLD-SCNC: 139 MMOL/L

## 2020-12-18 NOTE — TELEPHONE ENCOUNTER
Cami Jin from [facility] out pt clinic phoned - [phone]- she states wendy comes in for potassium infusions and wanted to report she is always at least 2 hrs late - never comes when she is scheduled to come - they state if this continues they will no longer give infusions - pt was notified but they wanted dr Katelyn Urena to know.

## 2020-12-18 NOTE — TELEPHONE ENCOUNTER
Also received a call from Sophia Baez at [facility]. Patient is at OP receiving her IV potassium. Stated patient complains of nausea, not eating well or being able to keep anything down, tired and dizzy. Stated she was just recently started on Aldactone 25 mg 1/2 tab. Didn't know if we had ordered any followup blood work since the change. Patient stated she only had urinated x 2 in a day. Eyes are sunken in her head. Wanted to know if you wanted any hydration orders or labs to be checked while she is there. Please advise    Dr Sheldon Penn served.

## 2020-12-18 NOTE — TELEPHONE ENCOUNTER
Dr Nielson Shown returned call. Ordered [address] over 4 hours  BMP- with call with results  If patient is not feeling better after fluid, she is to be seen at ER.     BP today 95/67, P77, Spo2 98%, R 16    Sara notified and verbalized understanding

## 2020-12-22 ENCOUNTER — TELEPHONE (OUTPATIENT)
Dept: PSYCHIATRY | Age: 40
End: 2020-12-22

## 2020-12-22 LAB
MAGNESIUM: 2.2 MG/DL
POTASSIUM (K+): 3.4

## 2020-12-22 NOTE — TELEPHONE ENCOUNTER
Ross Sebastian called into the office stating that she was started on Effexor 37.5mg medication; she said that she was instructed to take it 2 times daily; but she reports that she is only taking it once daily due to its side effects. She said that she has been on the medication for a week now. She reports that every medication that she is prescribed seems to not work for her body; she reports that she had bariatric surgery and they really dont work for her now. She noticed a common theme with some of her medications; she said that if they have the HCL after them; she really really does not do well on the medication; she reports that the Effexor has the HCL after it. She said that today at work she spent 2 hours 1/2 on the floor and 1/2 on the couch and that's not typically behavior for her; she said that she is just so sick with the medication (nausous). She reports that there have not been any other medication changes in the meantime. I asked her if she was taking her Hydroxyzine; she reports that she is not. She said that since her body is so sensitive to medication she said that she was going to try to start the Effexor first and then if that went okay then she was going to start the Hydroxyzine; but she has not gotten to that point yet. So at this time she is not taking her Hydroxyzine yet. I informed her that this information would be forwarded to this provider; but she is aware that she does not return until after the new year but would possibly be checking some messages; she understood. She said that she is not going to discontinue the medication in the meantime because she did not want any adverse side effects; that she would wait to hear back from me. Please advise. She does not return until 01/22/21.

## 2020-12-23 NOTE — TELEPHONE ENCOUNTER
Patient needs to call her bariatric surgeon and/or PCP. She has had chronic nausea and vomiting since her gastric sleeve completed in July 2020. Verify she is taking the Zofran ODT that is on file. She can try taking the medication at night rather than during the day. Take it with some food. If symptoms persist beyond 2 weeks will need to discuss options.

## 2020-12-23 NOTE — TELEPHONE ENCOUNTER
I spoke with Cherelle Red and informed her of this information; she understood. She said that she would keep going on the medication and try this provider's suggestions. She reports that she only takes the Zofran PRN and it does not always work; she said that she sometimes she will try that or suck on a peppermint; but it does not take the nausea away. She said that she would also reach out to either provider as well and see what their thoughts are. I informed her that if the symptoms persist another 2 weeks to give the office a call and other options will be discussed; she understood.

## 2020-12-24 LAB
MAGNESIUM: 2.2 MG/DL
POTASSIUM (K+): 3.3

## 2020-12-30 ENCOUNTER — OFFICE VISIT (OUTPATIENT)
Dept: NEPHROLOGY | Age: 40
End: 2020-12-30
Payer: COMMERCIAL

## 2020-12-30 VITALS
SYSTOLIC BLOOD PRESSURE: 108 MMHG | HEART RATE: 85 BPM | DIASTOLIC BLOOD PRESSURE: 68 MMHG | BODY MASS INDEX: 30.93 KG/M2 | TEMPERATURE: 97.2 F | OXYGEN SATURATION: 97 % | WEIGHT: 166.4 LBS

## 2020-12-30 PROCEDURE — 99213 OFFICE O/P EST LOW 20 MIN: CPT | Performed by: INTERNAL MEDICINE

## 2020-12-30 PROCEDURE — G8427 DOCREV CUR MEDS BY ELIG CLIN: HCPCS | Performed by: INTERNAL MEDICINE

## 2020-12-30 PROCEDURE — G8484 FLU IMMUNIZE NO ADMIN: HCPCS | Performed by: INTERNAL MEDICINE

## 2020-12-30 PROCEDURE — G8417 CALC BMI ABV UP PARAM F/U: HCPCS | Performed by: INTERNAL MEDICINE

## 2020-12-30 PROCEDURE — 1036F TOBACCO NON-USER: CPT | Performed by: INTERNAL MEDICINE

## 2020-12-30 RX ORDER — AMILORIDE HYDROCHLORIDE 5 MG/1
5 TABLET ORAL DAILY
Qty: 30 TABLET | Refills: 3 | Status: SHIPPED | OUTPATIENT
Start: 2020-12-30 | End: 2021-03-02 | Stop reason: SDUPTHER

## 2020-12-30 NOTE — PROGRESS NOTES
[facility]  [address] [id number]  Dept: [phone]  Loc: [phone]  Progress Note  2020 11:12 AM      Pt Name:    Rosalba Camargo:    1980  Primary Care Physician:  Padmini Cardoso, APRN - CNP     Chief Complaint:   Chief Complaint   Patient presents with    Follow-up     hypokalemia         History of Present Illness: This is a follow-up visit for hypokalemia. Has not needed any potassium infusions in the past week. Taking Effer-K 25 meq 1 or 2 a day, unable to tolerate much more than that due to N/V. States she is feeling better this week and able to have more po intake. On spironolactone 12.5 mg daily but admits to sometimes not taking it b/c doesn't feel well at times. She has hx of hypokalemia since gastric sleeve surgery in July. 24 hour urine for potassium was high at 50 meq. Pertinent items are noted in HPI. Past History:  Past Medical History:   Diagnosis Date    Anemia     Gastroesophageal reflux disease 10/19/2020    Headache     Hypothyroidism     Low back pain 10/19/2020    Mixed anxiety depressive disorder 10/19/2020    Mixed hyperlipidemia 10/19/2020    Obesity      Past Surgical History:   Procedure Laterality Date     SECTION      COLON SURGERY      HYSTERECTOMY      SLEEVE GASTRECTOMY          VITALS:  /68 (Site: Left Upper Arm, Position: Sitting, Cuff Size: Large Adult)   Pulse 85   Temp 97.2 °F (36.2 °C) (Temporal)   Wt 166 lb 6.4 oz (75.5 kg)   SpO2 97%   BMI 30.93 kg/m²   Wt Readings from Last 3 Encounters:   20 166 lb 6.4 oz (75.5 kg)   20 179 lb 3.2 oz (81.3 kg)   10/14/20 191 lb 3.2 oz (86.7 kg)     Body mass index is 30.93 kg/m².      General Appearance: alert and cooperative with exam, appears comfortable, no distress  HEENT: EOMI, moist oral mucus membranes  Neck: No jugular venous distention, Lungs: Air entry B/L, no crackles or rales, no use of accessory muscles  Heart: S1, S2 heard, no rub  GI: soft, non-tender, no guarding,   Extremities: No sig LE edema, no cyanosis  Skin: warm, dry  Neurologic: no tremor, no asterixis, no focal neurologic deficits     Medications:  Current Outpatient Medications   Medication Sig Dispense Refill    aMILoride (MIDAMOR) 5 MG tablet Take 1 tablet by mouth daily 30 tablet 3    venlafaxine (EFFEXOR) 37.5 MG tablet Take 1 tablet by mouth 2 times daily 60 tablet 1    hydrOXYzine (VISTARIL) 25 MG capsule Take 1 capsule by mouth 3 times daily as needed for Anxiety 90 capsule 0    levothyroxine (SYNTHROID) 125 MCG tablet take 1 tablet by mouth once daily 90 tablet 0    potassium bicarbonate (EFFER-K) 25 MEQ disintegrating tablet Take 2 tablets by mouth 3 times daily 540 tablet 3    potassium chloride 20 MEQ/15ML (10%) oral solution Take 20 mEq by mouth 2 times daily      famotidine (PEPCID) 20 MG tablet Take 20 mg by mouth daily       ondansetron (ZOFRAN-ODT) 4 MG disintegrating tablet Take 1 tablet by mouth 3 times daily as needed for Nausea or Vomiting 60 tablet 1     No current facility-administered medications for this visit.          Laboratory & Diagnostics:  CBC:   Lab Results   Component Value Date    WBC 6.12 10/26/2020    HGB 12.7 10/26/2020    HCT 38.5 10/26/2020    MCV 95.3 02/18/2019     10/26/2020     BMP:    Lab Results   Component Value Date     12/18/2020     10/26/2020     10/02/2020    K 3.3 12/24/2020    K 3.4 12/22/2020    K 3.2 12/18/2020    K 3.3 12/18/2020     12/18/2020     10/26/2020     10/02/2020    CO2 23 12/18/2020    CO2 30 10/26/2020    CO2 28 10/02/2020    BUN 14 12/18/2020    BUN 10 10/26/2020    BUN 11 10/02/2020    CREATININE 0.9 12/18/2020    CREATININE 0.8 10/26/2020    CREATININE 0.8 10/02/2020    GLUCOSE 87 12/18/2020    GLUCOSE 87 10/26/2020    GLUCOSE 84 10/02/2020      Hepatic:   Lab Results   Component Value Date    AST 19 02/18/2019    AST 26 07/13/2018    AST 20 09/01/2017    ALT 13 02/18/2019    ALT 24 07/13/2018    ALT 12 09/01/2017    BILITOT 0.3 02/18/2019    BILITOT 0.4 07/13/2018    BILITOT 0.4 09/01/2017    ALKPHOS 27 (L) 02/18/2019    ALKPHOS 20 (L) 07/13/2018    ALKPHOS 24 (L) 09/01/2017     BNP: No results found for: BNP  Lipids:   Lab Results   Component Value Date    CHOL 201 (H) 02/18/2019    HDL 50 02/18/2019     INR:   Lab Results   Component Value Date    INR 1.05 07/13/2018     URINE:   Lab Results   Component Value Date    NAUR 78 10/25/2020    NAUR 78 10/25/2020    PROTUR Negative 07/13/2018     Lab Results   Component Value Date    NITRU Negative 07/13/2018    COLORU Yellow 07/13/2018    WBCUA 0-5 07/13/2018    RBCUA 3-5 07/13/2018    MUCUS Present 07/13/2018    LEUKOCYTESUR Negative 07/13/2018    UROBILINOGEN <2.0 E.U./dL 07/13/2018    BILIRUBINUR Negative 07/13/2018    GLUCOSEU Negative 07/13/2018    KETUA Negative 07/13/2018      Microalbumen/Creatinine ratio:  No components found for: RUCREAT        Impression/Plan:   1. Hypokalemia: has some renal potassium wasting. Very unlikely to sporadically develop a Barters or gitelmans syndrome after her gastric sleeve surgery. I think the explanation is renal potassium wasting due to upper GI losses causing a metabolic alkalosis and subsequent urinary potassium loss. -does not tolerate Spironolactone. Will try amiloride 5 mg daily and uptitrate as able  -effer-K 25 meq BID (does not tolerate more than that)  -weekly K levels, will give infusion if K <3. Will get PICC line removed in next few weeks if she has not needed any infusions within that time          Bloodwork and medications were reviewed and plan of care discussed with the patient. Return to clinic in 2 months or sooner if the need arises.       Jadon Salguero,   [facility]

## 2021-01-04 LAB
MAGNESIUM: 2 MG/DL
POTASSIUM (K+): 3.2

## 2021-01-11 LAB
MAGNESIUM: 2.1 MG/DL
POTASSIUM (K+): 3.4

## 2021-01-14 DIAGNOSIS — E87.6 HYPOKALEMIA: Primary | ICD-10-CM

## 2021-01-14 NOTE — PROGRESS NOTES
Patient was contacted to schedule with Dr. Geneva Richardson. Patient stated that she is already in counseling services at this time.

## 2021-01-22 ENCOUNTER — VIRTUAL VISIT (OUTPATIENT)
Dept: PSYCHIATRY | Age: 41
End: 2021-01-22
Payer: COMMERCIAL

## 2021-01-22 DIAGNOSIS — F41.1 GENERALIZED ANXIETY DISORDER: ICD-10-CM

## 2021-01-22 DIAGNOSIS — F33.1 MODERATE EPISODE OF RECURRENT MAJOR DEPRESSIVE DISORDER (HCC): Primary | ICD-10-CM

## 2021-01-22 PROCEDURE — 90833 PSYTX W PT W E/M 30 MIN: CPT | Performed by: NURSE PRACTITIONER

## 2021-01-22 PROCEDURE — 99214 OFFICE O/P EST MOD 30 MIN: CPT | Performed by: NURSE PRACTITIONER

## 2021-01-22 PROCEDURE — G8427 DOCREV CUR MEDS BY ELIG CLIN: HCPCS | Performed by: NURSE PRACTITIONER

## 2021-01-22 PROCEDURE — G8484 FLU IMMUNIZE NO ADMIN: HCPCS | Performed by: NURSE PRACTITIONER

## 2021-01-22 PROCEDURE — 1036F TOBACCO NON-USER: CPT | Performed by: NURSE PRACTITIONER

## 2021-01-22 PROCEDURE — G8417 CALC BMI ABV UP PARAM F/U: HCPCS | Performed by: NURSE PRACTITIONER

## 2021-01-22 RX ORDER — BUSPIRONE HYDROCHLORIDE 5 MG/1
5 TABLET ORAL 3 TIMES DAILY
Qty: 90 TABLET | Refills: 1 | Status: SHIPPED | OUTPATIENT
Start: 2021-01-22 | End: 2021-03-29 | Stop reason: DRUGHIGH

## 2021-01-22 RX ORDER — ESCITALOPRAM OXALATE 5 MG/5ML
5 SOLUTION ORAL DAILY
Qty: 150 ML | Refills: 1 | Status: SHIPPED | OUTPATIENT
Start: 2021-01-22 | End: 2021-02-26 | Stop reason: DRUGHIGH

## 2021-01-22 NOTE — PROGRESS NOTES
[address] [place] [address]  Dept: [phone]  Dept Fax: [phone]: [phone]    Visit Date: 1/22/2021    TELEPSYCHIATRY VISIT -- Audio/Visual (During [id number] public health emergency)     Gely Archuleta is a 36 y.o. female being evaluated by a Virtual Visit (video visit) encounter to address concerns as mentioned  below. A caregiver was present when appropriate. Pursuant to the emergency declaration under the [address] [place] [address], [address] [place] [address] authority and the [employer] Act, this Virtual Visit was conducted with patient's (and/or legal guardian's) consent, to reduce the patient's risk of exposure to COVID-19 and provide necessary medical care. The patient (and/or legal guardian) has also been advised to contact this office for worsening conditions or problems, and seek emergency medical treatment and/or call 911 if deemed necessary. Services were provided through a video synchronous discussion virtually to substitute for in-person clinic visit. Patient and provider were located at their individual homes. SUBJECTIVE DATA     CHIEF COMPLAINT:    Chief Complaint   Patient presents with    Depression    Anxiety    Follow-up       History obtained from: patient    HISTORY OF PRESENT ILLNESS:    Gely Archuleta is a 36 y.o. female who presents via virtual video visit for follow-up on complaints of depression and anxiety.      States she is doing better since last visit, but had a terrible month  -states she was going to lie but decided to tell the truth    Nephrology is monitoring her potassium  -states \"I'm functioning well\"  -Had PICC line placed to have potassium infusions  -on 1/15/2020 had PICC line removed  -levels have stabilized     Hydroxyzine makes her too sleepy  -states she is already tired from surgery Effexor makes her very ill  -causes her a lot of nausea  -states \"It makes me violently ill\"  -within 20-30 min she has dry heaves and/or vomiting  -this lasts about 2 hours  -has only been taking it 5 of the 7 days of the week  -less bothersome when she takes the medication with food but it is still present    Mood is slightly improved  -better able to get up and out of bed  -going to work every day  -seeing her therapist weekly - has had some very powerful moments and breakthroughs in sessions  -irritability is very problematic; states \"I have no filter. I don't even give a fuck. I say what's on my mind. \"  -very difficult time remaining calm  -having a difficult time processing stressors   -states she often wants to run from her stressors  -has a difficult time feeling/processing negative emotions    When she doesn't take the Effexor, she does not have the nausea/vomiting  -she has been able to take the levothyroxine without difficulty    States holidays were very difficult for her  -she stayed in bed for 3 days    Denies suicidal ideations, intent, plan. No homicidal ideations, intent, plan. No audiovisual hallucinations. HPI    The patient has had 1 lifetime suicide attempts. Methods used for the suicide attempts include overdose on medications. The patient's most recent suicide attempt was 2007. Patient reports 1 psych hospital admissions with the last admission taking place 2007. NOTE: Patient states she did attempt suicide as a teenager but does not recall the number of times, methods or number of hospitalizations. The above information reflects SA as an adult.     Past psychiatric medications include: \"too many to list\" Prozac, Zoloft, Celexa, Cymbalta, BuSpar, Wellbutrin, Trintellix, Paxil    Adverse reactions from psychotropic medications:  None      Current Psychiatric Review of Systems         Jazlyn or Hypomania:  no     Panic Attacks:  no     Phobias:  no     Obsessions and Compulsions:  no Body or Vocal Tics:  no     Hallucinations:  no     Delusions:  no    SOCIAL HISTORY:  Patient was born in [place] and raised by her maternal grandparents      Social History     Socioeconomic History    Marital status:      Spouse name: Not on file    Number of children: 9    Years of education: Not on file    Highest education level: Master's degree (e.g., MA, MS, Selena, MEd, MSW, LINDA)   Occupational History    Occupation: advocacy coordinator   Social Needs    Financial resource strain: Not hard at all   [employer] insecurity     Worry: Never true     Inability: Never true    Transportation needs     Medical: No     Non-medical: No   Tobacco Use    Smoking status: Never Smoker    Smokeless tobacco: Never Used   Substance and Sexual Activity    Alcohol use: No    Drug use: Yes     Types: Marijuana     Comment: medical marijuana    Sexual activity: Yes     Partners: Male   Lifestyle    Physical activity     Days per week: Not on file     Minutes per session: Not on file    Stress: Not on file   Relationships    Social connections     Talks on phone: Not on file     Gets together: Not on file     Attends Taoism service: Not on file     Active member of club or organization: Not on file     Attends meetings of clubs or organizations: Not on file     Relationship status: Not on file    Intimate partner violence     Fear of current or ex partner: Not on file     Emotionally abused: Not on file     Physically abused: Not on file     Forced sexual activity: Not on file   Other Topics Concern    Not on file   Social History Narrative    12/9/2020    LEVEL OF EDUCATION: graduated high school; earned her bachelor degrees in both social work and criminal justice; earned her master's degree social work    SPECIAL EDUCATION NEEDS: None RESIDENCE: Currently lives with her children; mom; and her sister and sister's children are now also living in the home (there are a total of 15 people living in the home)    LEGAL HISTORY: None    Mormonism: None    TRAUMA: Yes - does not want to disclose about this at this time    : None    HOBBIES: crafts; spending time with her children    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 MARRIAGES: two. The first marriage was in 2004 and they  after 1.5 years. Second marriage lasted until 2016. They are  after 15 years. CHILDREN: 7     SUBSTANCE USE:    1. Marijuana: medical marijuana - using 2 or 3 times per week. Tried it in high school but didn't start using the medical marijuana until Aug. 2020.  Admits she has been using more recently because she has more stressors       FAMILY HISTORY:   Family History   Problem Relation Age of Onset    Anemia Sister     Asthma Sister     Arthritis Maternal Grandmother     Cancer Maternal Grandmother     Hearing Loss Maternal Grandmother     Miscarriages / Stillbirths Maternal Grandmother     Allergy (Severe) Maternal Grandfather     Arthritis Maternal Grandfather     Arrhythmia Maternal Grandfather     Asthma Maternal Grandfather     Coronary Art Dis Maternal Grandfather     Depression Maternal Grandfather     Diabetes Maternal Grandfather     Hearing Loss Maternal Grandfather     High Blood Pressure Maternal Grandfather     Lupus Mother     Drug Abuse Mother        Psychiatric Family History  Son has \"mental health issues\"    PAST MEDICAL HISTORY:    Past Medical History:   Diagnosis Date    Anemia     Gastroesophageal reflux disease 10/19/2020    Headache     Hypothyroidism     Low back pain 10/19/2020    Mixed anxiety depressive disorder 10/19/2020    Mixed hyperlipidemia 10/19/2020    Obesity        PAST SURGICAL HISTORY: Past Surgical History:   Procedure Laterality Date     SECTION      COLON SURGERY      HYSTERECTOMY      SLEEVE GASTRECTOMY         PREVIOUSMEDICATIONS:  Outpatient Medications Prior to Visit   Medication Sig Dispense Refill    aMILoride (MIDAMOR) 5 MG tablet Take 1 tablet by mouth daily 30 tablet 3    venlafaxine (EFFEXOR) 37.5 MG tablet Take 1 tablet by mouth 2 times daily 60 tablet 1    hydrOXYzine (VISTARIL) 25 MG capsule Take 1 capsule by mouth 3 times daily as needed for Anxiety 90 capsule 0    levothyroxine (SYNTHROID) 125 MCG tablet take 1 tablet by mouth once daily 90 tablet 0    potassium bicarbonate (EFFER-K) 25 MEQ disintegrating tablet Take 2 tablets by mouth 3 times daily 540 tablet 3    potassium chloride 20 MEQ/15ML (10%) oral solution Take 20 mEq by mouth 2 times daily      famotidine (PEPCID) 20 MG tablet Take 20 mg by mouth daily       ondansetron (ZOFRAN-ODT) 4 MG disintegrating tablet Take 1 tablet by mouth 3 times daily as needed for Nausea or Vomiting 60 tablet 1     No facility-administered medications prior to visit. ALLERGIES:    Patient has no known allergies. REVIEW OF SYSTEMS:    Review of Systems    The patient sees PELON Sarkar CNP as her primary care provider. SPECIALISTS: nephrology for the hypokalemia; bariatric surgeon (Dr. Ira Cameron)    OBJECTIVE DATA     There were no vitals taken for this visit. Physical Exam    Mental Status Evaluation:   Orientation: Alert, oriented, thought content appropriate   Mood:. Anxious, Depressed and Irritable      Affect:  Mood Congruent      Appearance:  Age Appropriate, Casually Dressed, Clean, Well Groomed, Clothing Appropriate for Age and Clothing Appropriate for Weather   Activity:  Cooperative, Good Eye Contact and Seated Calmly   Gait/Posture: Normal   Speech:  Clear, Fluent, Normal Pitch and Volume, Age and Situation Appropriate   Thought Process:   Within Normal Limits Patient is intolerant of the Effexor XR. She continues to have nausea and vomiting. It is unclear if the nausea/vomiting is strictly from the Effexor XR or if it is also due to her uncontrolled depression/anxiety as well as possible complications from weight loss surgery. Since patient believes she is intolerant she will stop the medication and start Lexapro 5mg daily. Will utilize the liquid formulation due to patient prior report of intolerance of tablets/capsules, improve absorption and allow for reduced dosing if necessary. Patient is again encouraged to actively participate in individual psychotherapy. Patient is encouraged to utilize nonpharmacologic coping skills such as deep breathing, guided imagery, guided meditation, muscle relaxation, calming music, and/or journaling. Risks, potential side effects, possibledrug-drug interactions, benefits and alternate treatments discussed in detail. All questions answered. Patient stated understanding and is agreeable to treatment plan. Patient has been instructed to seek emergency help via the emergency and/or calling 911 should symptoms become severe, worsen, or with other concerning symptoms. Patient instructed to goimmediately to the emergency room and/or call 911 with any suicidal or homicidal ideations or if audio/visual hallucinations develop  Patient stated understanding and agrees. Patient given crisis center information. I spent a total of 30 minutes with the patient in counseling regarding topics discussed above. Utilized CBT, MI, and psychoeducation to address topics above. Patient was engaged and responsive throughout session. The remainder of session was spent on symptom evaluation and medication management.     Provider Signature:  Electronically signed by PELON Hutson CNP on 1/22/2021 at 8:09 AM **This report has been created using voice recognition software. It may contain minor errors which are inherent in voice recognition technology. **

## 2021-01-25 ENCOUNTER — OFFICE VISIT (OUTPATIENT)
Dept: FAMILY MEDICINE CLINIC | Age: 41
End: 2021-01-25
Payer: COMMERCIAL

## 2021-01-25 VITALS
BODY MASS INDEX: 29.56 KG/M2 | SYSTOLIC BLOOD PRESSURE: 96 MMHG | TEMPERATURE: 98.6 F | WEIGHT: 159 LBS | DIASTOLIC BLOOD PRESSURE: 64 MMHG | HEART RATE: 68 BPM | RESPIRATION RATE: 16 BRPM

## 2021-01-25 DIAGNOSIS — M25.511 ACUTE PAIN OF RIGHT SHOULDER: ICD-10-CM

## 2021-01-25 DIAGNOSIS — M67.819 TENDINOSIS OF ROTATOR CUFF: Primary | ICD-10-CM

## 2021-01-25 DIAGNOSIS — Z98.890 S/P GASTRIC SURGERY: ICD-10-CM

## 2021-01-25 PROCEDURE — 96372 THER/PROPH/DIAG INJ SC/IM: CPT | Performed by: NURSE PRACTITIONER

## 2021-01-25 PROCEDURE — G8427 DOCREV CUR MEDS BY ELIG CLIN: HCPCS | Performed by: NURSE PRACTITIONER

## 2021-01-25 PROCEDURE — G8484 FLU IMMUNIZE NO ADMIN: HCPCS | Performed by: NURSE PRACTITIONER

## 2021-01-25 PROCEDURE — 99214 OFFICE O/P EST MOD 30 MIN: CPT | Performed by: NURSE PRACTITIONER

## 2021-01-25 PROCEDURE — 1036F TOBACCO NON-USER: CPT | Performed by: NURSE PRACTITIONER

## 2021-01-25 PROCEDURE — G8417 CALC BMI ABV UP PARAM F/U: HCPCS | Performed by: NURSE PRACTITIONER

## 2021-01-25 RX ORDER — METHYLPREDNISOLONE ACETATE 80 MG/ML
120 INJECTION, SUSPENSION INTRA-ARTICULAR; INTRALESIONAL; INTRAMUSCULAR; SOFT TISSUE ONCE
Status: COMPLETED | OUTPATIENT
Start: 2021-01-25 | End: 2021-01-25

## 2021-01-25 RX ORDER — LIDOCAINE 40 MG/G
CREAM TOPICAL
Qty: 1 TUBE | Refills: 2 | Status: SHIPPED | OUTPATIENT
Start: 2021-01-25

## 2021-01-25 RX ORDER — TIZANIDINE 2 MG/1
2 TABLET ORAL EVERY 8 HOURS PRN
Qty: 20 TABLET | Refills: 0 | Status: SHIPPED | OUTPATIENT
Start: 2021-01-25 | End: 2021-12-10

## 2021-01-25 RX ADMIN — METHYLPREDNISOLONE ACETATE 120 MG: 80 INJECTION, SUSPENSION INTRA-ARTICULAR; INTRALESIONAL; INTRAMUSCULAR; SOFT TISSUE at 11:30

## 2021-01-25 NOTE — PROGRESS NOTES
Administrations This Visit     methylPREDNISolone acetate (DEPO-MEDROL) injection 120 mg     Admin Date  01/25/2021  11:30 Action  Given Dose  120 mg Route  Intramuscular Site  Deltoid Right Administered By  Patti Carbajal RN    Ordering Provider: PELON Mancia CNP    NDC: 5008-7913-69    Lot#: [id number]    : [id number]     Patient Supplied?: No

## 2021-01-27 ASSESSMENT — ENCOUNTER SYMPTOMS: BACK PAIN: 1

## 2021-01-27 NOTE — PROGRESS NOTES
[address] [address] [address] [facility] [address]  Dept: [phone]  Dept Fax: [phone]  Loc: [phone]  PROGRESS NOTE      VisitDate: 2021    Wolf Henning is a 36 y.o. female who presents today for:     Chief Complaint   Patient presents with   [place] ED Follow-up      [facility] for Right upper arm pain. Picc line was removed 1/15/21 (Had been getting K+ infusion due to s/p gastric sleeve, hypokalemia). Did CT upper ext, right shoulder xray and cxr. States she had an [address] on  that showed thrombophlebitis. Last infusion was approx 6 wks ago. Subjective:  ED follow-up [facility] 2021 right arm and shoulder pain. Patient reports she had a PICC line discontinued on 1/15/2021. Reports that she had an ultrasound earlier today for right upper extremity. Patient has recent status post gastric sleeve. History of anemia, GERD, hypothyroid, anxiety/depression, hyperlipidemia. Complains of a right shoulder pain the past several weeks. Denies any specific injury. Reports the pain is intense at times 10 out of 10. Unable to move shoulder without intense pain. Review of Systems   Musculoskeletal: Positive for arthralgias and back pain. Psychiatric/Behavioral: Positive for decreased concentration and dysphoric mood. The patient is nervous/anxious. All other systems reviewed and are negative.     Past Medical History:   Diagnosis Date    Anemia     Gastroesophageal reflux disease 10/19/2020    Headache     Hypothyroidism     Low back pain 10/19/2020    Mixed anxiety depressive disorder 10/19/2020    Mixed hyperlipidemia 10/19/2020    Obesity       Past Surgical History:   Procedure Laterality Date     SECTION      COLON SURGERY      HYSTERECTOMY      SLEEVE GASTRECTOMY       Family History   Problem Relation Age of Onset    Anemia Sister     Asthma Sister     Arthritis Maternal Grandmother     Cancer Maternal Grandmother     Hearing Loss Maternal Grandmother     Miscarriages / Stillbirths Maternal Grandmother     Allergy (Severe) Maternal Grandfather     Arthritis Maternal Grandfather     Arrhythmia Maternal Grandfather     Asthma Maternal Grandfather     Coronary Art Dis Maternal Grandfather     Depression Maternal Grandfather     Diabetes Maternal Grandfather     Hearing Loss Maternal Grandfather     High Blood Pressure Maternal Grandfather     Lupus Mother     Drug Abuse Mother      Social History     Tobacco Use    Smoking status: Never Smoker    Smokeless tobacco: Never Used   Substance Use Topics    Alcohol use: No      Current Outpatient Medications   Medication Sig Dispense Refill    tiZANidine (ZANAFLEX) 2 MG tablet Take 1 tablet by mouth every 8 hours as needed (muscle spasm) 20 tablet 0    diclofenac sodium (VOLTAREN) 1 % GEL Apply 2 g topically 2 times daily To shoulder 150 g 1    lidocaine (LMX) 4 % cream Apply3 times daily to shoudler topically as needed. 1 Tube 2    aMILoride (MIDAMOR) 5 MG tablet Take 1 tablet by mouth daily 30 tablet 3    levothyroxine (SYNTHROID) 125 MCG tablet take 1 tablet by mouth once daily 90 tablet 0    potassium bicarbonate (EFFER-K) 25 MEQ disintegrating tablet Take 2 tablets by mouth 3 times daily 540 tablet 3    famotidine (PEPCID) 20 MG tablet Take 20 mg by mouth daily       ondansetron (ZOFRAN-ODT) 4 MG disintegrating tablet Take 1 tablet by mouth 3 times daily as needed for Nausea or Vomiting 60 tablet 1    escitalopram (LEXAPRO) 5 MG/5ML solution Take 5 mLs by mouth daily (Patient not taking: Reported on 1/25/2021) 150 mL 1    busPIRone (BUSPAR) 5 MG tablet Take 1 tablet by mouth 3 times daily (Patient not taking: Reported on 1/25/2021) 90 tablet 1     No current facility-administered medications for this visit.       No Known Allergies  Health Maintenance   Topic Date Due    Hepatitis C screen  1980    Varicella vaccine (1 of 2 - 2-dose childhood series) 05/25/1981    HIV screen  05/25/1995    DTaP/Tdap/Td vaccine (1 - Tdap) 05/25/1999    Cervical cancer screen  05/25/2001    Flu vaccine (1) 09/01/2020    TSH testing  10/02/2021    Creatinine monitoring  12/18/2021    Potassium monitoring  01/11/2022    Lipid screen  02/18/2024    Hepatitis A vaccine  Aged Out    Hepatitis B vaccine  Aged Out    Hib vaccine  Aged Out    Meningococcal (ACWY) vaccine  Aged Out    Pneumococcal 0-64 years Vaccine  Aged Out           Component      Latest Ref Rng & Units 1/11/2021 1/4/2021 12/24/2020 12/18/2020   Sodium      mmol/L    139   Chloride      mmol/L    102   Potassium       3.4 3.2 3.3 3.3   BUN      mg/dL    14   Creatinine          0.9   Glucose      mg/dL    87   CO2      mmol/L    23   Calcium      mg/dL    9.5   Gfr Calculated          74   Magnesium      mg/dL 2.1 2.0 2.2      Objective:     Physical Exam  Vitals signs and nursing note reviewed. Constitutional:       Appearance: Normal appearance. She is well-developed. She is obese. HENT:      Head: Normocephalic. Nose: Nose normal.      Mouth/Throat:      Pharynx: Oropharynx is clear. Eyes:      Conjunctiva/sclera: Conjunctivae normal.   Neck:      Musculoskeletal: Neck supple. Cardiovascular:      Rate and Rhythm: Normal rate and regular rhythm. Heart sounds: Normal heart sounds. Pulmonary:      Effort: Pulmonary effort is normal.      Breath sounds: Normal breath sounds. Abdominal:      General: Bowel sounds are normal.      Palpations: Abdomen is soft. Musculoskeletal:      Right shoulder: She exhibits decreased range of motion, tenderness, pain and decreased strength. She exhibits no bony tenderness, no crepitus, no deformity and normal pulse. Comments: Active and passive range of motion limited to approximately 30 to 40% due to pain. Rotator cuff tenderness noted with palpation. Unable to assess.    Skin:     General: Skin is warm and dry. Neurological:      General: No focal deficit present. Mental Status: She is alert and oriented to person, place, and time. Psychiatric:         Mood and Affect: Mood normal.         Thought Content: Thought content normal.       BP 96/64   Pulse 68   Temp 98.6 °F (37 °C) (Oral)   Resp 16   Wt 159 lb (72.1 kg)   BMI 29.56 kg/m²       Impression/Plan:  1. Tendinosis of rotator cuff    2. S/P gastric surgery    3. Acute pain of right shoulder      Requested Prescriptions     Signed Prescriptions Disp Refills    tiZANidine (ZANAFLEX) 2 MG tablet 20 tablet 0     Sig: Take 1 tablet by mouth every 8 hours as needed (muscle spasm)    diclofenac sodium (VOLTAREN) 1 %  g 1     Sig: Apply 2 g topically 2 times daily To shoulder    lidocaine (LMX) 4 % cream 1 Tube 2     Sig: Apply3 times daily to shoudler topically as needed. Orders Placed This Encounter   Procedures   Sj Jean MD, Orthopedic Surgery, [facility]     Referral Priority:   Routine     Referral Type:   Eval and Treat     Referral Reason:   Specialty Services Required     Referred to Provider:   Girish Hawthorne MD     Requested Specialty:   Orthopedic Surgery     Number of Visits Requested:   1       Patient giveneducational materials - see patient instructions. Discussed use, benefit, and side effects of prescribed medications. All patient questions answered. Pt voiced understanding. Reviewed health maintenance. Patient agreedwith treatment plan. Follow up as directed. Depo-Medrol injection 120 IM provided in office. Zanaflex prescribed. Shoulder bursitis exercises provided. Voltaren lidocaine gel prescribed. Consult with Ortho.     Electronically signed by PELON Sidhu CNP on 1/27/2021 at 10:24 AM

## 2021-01-28 ENCOUNTER — HOSPITAL ENCOUNTER (EMERGENCY)
Age: 41
Discharge: HOME | End: 2021-01-28
Payer: COMMERCIAL

## 2021-01-28 VITALS
TEMPERATURE: 97.8 F | OXYGEN SATURATION: 99 % | RESPIRATION RATE: 18 BRPM | SYSTOLIC BLOOD PRESSURE: 113 MMHG | DIASTOLIC BLOOD PRESSURE: 66 MMHG | HEART RATE: 53 BPM

## 2021-01-28 DIAGNOSIS — M75.31 CALCIFIC TENDONITIS OF RIGHT SHOULDER: Primary | ICD-10-CM

## 2021-01-28 LAB
ANION GAP SERPL CALCULATED.3IONS-SCNC: 11 MEQ/L (ref 8–16)
BASOPHILS # BLD: 0.2 %
BASOPHILS ABSOLUTE: 0 THOU/MM3 (ref 0–0.1)
BUN BLDV-MCNC: 18 MG/DL (ref 7–22)
C-REACTIVE PROTEIN: 2.56 MG/DL (ref 0–1)
CALCIUM SERPL-MCNC: 9.7 MG/DL (ref 8.5–10.5)
CHLORIDE BLD-SCNC: 102 MEQ/L (ref 98–111)
CO2: 28 MEQ/L (ref 23–33)
CREAT SERPL-MCNC: 0.5 MG/DL (ref 0.4–1.2)
EOSINOPHIL # BLD: 1 %
EOSINOPHILS ABSOLUTE: 0.1 THOU/MM3 (ref 0–0.4)
ERYTHROCYTE [DISTWIDTH] IN BLOOD BY AUTOMATED COUNT: 13.9 % (ref 11.5–14.5)
ERYTHROCYTE [DISTWIDTH] IN BLOOD BY AUTOMATED COUNT: 49.1 FL (ref 35–45)
GFR SERPL CREATININE-BSD FRML MDRD: > 90 ML/MIN/1.73M2
GLUCOSE BLD-MCNC: 93 MG/DL (ref 70–108)
HCT VFR BLD CALC: 37 % (ref 37–47)
HEMOGLOBIN: 12.3 GM/DL (ref 12–16)
IMMATURE GRANS (ABS): 0.02 THOU/MM3 (ref 0–0.07)
IMMATURE GRANULOCYTES: 0.2 %
LYMPHOCYTES # BLD: 15.8 %
LYMPHOCYTES ABSOLUTE: 1.6 THOU/MM3 (ref 1–4.8)
MCH RBC QN AUTO: 32.3 PG (ref 26–33)
MCHC RBC AUTO-ENTMCNC: 33.2 GM/DL (ref 32.2–35.5)
MCV RBC AUTO: 97.1 FL (ref 81–99)
MONOCYTES # BLD: 7.6 %
MONOCYTES ABSOLUTE: 0.8 THOU/MM3 (ref 0.4–1.3)
NUCLEATED RED BLOOD CELLS: 0 /100 WBC
OSMOLALITY CALCULATION: 282.9 MOSMOL/KG (ref 275–300)
PLATELET # BLD: 364 THOU/MM3 (ref 130–400)
PMV BLD AUTO: 10.4 FL (ref 9.4–12.4)
POTASSIUM SERPL-SCNC: 3.1 MEQ/L (ref 3.5–5.2)
RBC # BLD: 3.81 MILL/MM3 (ref 4.2–5.4)
SEDIMENTATION RATE, ERYTHROCYTE: 93 MM/HR (ref 0–20)
SEG NEUTROPHILS: 75.2 %
SEGMENTED NEUTROPHILS ABSOLUTE COUNT: 7.4 THOU/MM3 (ref 1.8–7.7)
SODIUM BLD-SCNC: 141 MEQ/L (ref 135–145)
URIC ACID: 4.3 MG/DL (ref 2.4–5.7)
WBC # BLD: 9.9 THOU/MM3 (ref 4.8–10.8)

## 2021-01-28 PROCEDURE — 84550 ASSAY OF BLOOD/URIC ACID: CPT

## 2021-01-28 PROCEDURE — 96375 TX/PRO/DX INJ NEW DRUG ADDON: CPT

## 2021-01-28 PROCEDURE — 2500000003 HC RX 250 WO HCPCS: Performed by: NURSE PRACTITIONER

## 2021-01-28 PROCEDURE — 85025 COMPLETE CBC W/AUTO DIFF WBC: CPT

## 2021-01-28 PROCEDURE — 86140 C-REACTIVE PROTEIN: CPT

## 2021-01-28 PROCEDURE — 6360000002 HC RX W HCPCS: Performed by: NURSE PRACTITIONER

## 2021-01-28 PROCEDURE — 80048 BASIC METABOLIC PNL TOTAL CA: CPT

## 2021-01-28 PROCEDURE — 85651 RBC SED RATE NONAUTOMATED: CPT

## 2021-01-28 PROCEDURE — 96372 THER/PROPH/DIAG INJ SC/IM: CPT

## 2021-01-28 PROCEDURE — 6370000000 HC RX 637 (ALT 250 FOR IP): Performed by: NURSE PRACTITIONER

## 2021-01-28 PROCEDURE — 36415 COLL VENOUS BLD VENIPUNCTURE: CPT

## 2021-01-28 PROCEDURE — 96374 THER/PROPH/DIAG INJ IV PUSH: CPT

## 2021-01-28 PROCEDURE — 99284 EMERGENCY DEPT VISIT MOD MDM: CPT

## 2021-01-28 RX ORDER — KETOROLAC TROMETHAMINE 30 MG/ML
30 INJECTION, SOLUTION INTRAMUSCULAR; INTRAVENOUS ONCE
Status: COMPLETED | OUTPATIENT
Start: 2021-01-28 | End: 2021-01-28

## 2021-01-28 RX ORDER — LIDOCAINE HYDROCHLORIDE 10 MG/ML
5 INJECTION, SOLUTION INFILTRATION; PERINEURAL ONCE
Status: COMPLETED | OUTPATIENT
Start: 2021-01-28 | End: 2021-01-28

## 2021-01-28 RX ORDER — METHYLPREDNISOLONE ACETATE 80 MG/ML
80 INJECTION, SUSPENSION INTRA-ARTICULAR; INTRALESIONAL; INTRAMUSCULAR; SOFT TISSUE ONCE
Status: COMPLETED | OUTPATIENT
Start: 2021-01-28 | End: 2021-01-28

## 2021-01-28 RX ORDER — HYDROCODONE BITARTRATE AND ACETAMINOPHEN 5; 325 MG/1; MG/1
1 TABLET ORAL ONCE
Status: COMPLETED | OUTPATIENT
Start: 2021-01-28 | End: 2021-01-28

## 2021-01-28 RX ORDER — METHYLPREDNISOLONE SODIUM SUCCINATE 125 MG/2ML
125 INJECTION, POWDER, LYOPHILIZED, FOR SOLUTION INTRAMUSCULAR; INTRAVENOUS ONCE
Status: COMPLETED | OUTPATIENT
Start: 2021-01-28 | End: 2021-01-28

## 2021-01-28 RX ORDER — HYDROCODONE BITARTRATE AND ACETAMINOPHEN 5; 325 MG/1; MG/1
1 TABLET ORAL EVERY 6 HOURS PRN
Qty: 12 TABLET | Refills: 0 | Status: SHIPPED | OUTPATIENT
Start: 2021-01-28 | End: 2021-01-31

## 2021-01-28 RX ORDER — LIDOCAINE 4 G/G
1 PATCH TOPICAL DAILY
Status: DISCONTINUED | OUTPATIENT
Start: 2021-01-28 | End: 2021-01-28 | Stop reason: HOSPADM

## 2021-01-28 RX ADMIN — METHYLPREDNISOLONE ACETATE 80 MG: 80 INJECTION, SUSPENSION INTRA-ARTICULAR; INTRALESIONAL; INTRAMUSCULAR; SOFT TISSUE at 12:34

## 2021-01-28 RX ADMIN — KETOROLAC TROMETHAMINE 30 MG: 30 INJECTION, SOLUTION INTRAMUSCULAR at 11:00

## 2021-01-28 RX ADMIN — HYDROCODONE BITARTRATE AND ACETAMINOPHEN 1 TABLET: 5; 325 TABLET ORAL at 11:01

## 2021-01-28 RX ADMIN — LIDOCAINE HYDROCHLORIDE 5 ML: 10 INJECTION, SOLUTION INFILTRATION; PERINEURAL at 12:34

## 2021-01-28 RX ADMIN — METHYLPREDNISOLONE SODIUM SUCCINATE 125 MG: 125 INJECTION, POWDER, FOR SOLUTION INTRAMUSCULAR; INTRAVENOUS at 11:00

## 2021-01-28 ASSESSMENT — PAIN DESCRIPTION - ORIENTATION
ORIENTATION: RIGHT
ORIENTATION: RIGHT

## 2021-01-28 ASSESSMENT — PAIN DESCRIPTION - LOCATION
LOCATION: SHOULDER
LOCATION: SHOULDER

## 2021-01-28 ASSESSMENT — ENCOUNTER SYMPTOMS
SHORTNESS OF BREATH: 0
COLOR CHANGE: 0
VOMITING: 0
ABDOMINAL DISTENTION: 0
CHEST TIGHTNESS: 0
NAUSEA: 0

## 2021-01-28 ASSESSMENT — PAIN SCALES - GENERAL
PAINLEVEL_OUTOF10: 7
PAINLEVEL_OUTOF10: 9

## 2021-01-28 ASSESSMENT — PAIN DESCRIPTION - PAIN TYPE
TYPE: ACUTE PAIN

## 2021-01-28 ASSESSMENT — PAIN DESCRIPTION - DESCRIPTORS: DESCRIPTORS: THROBBING

## 2021-01-28 ASSESSMENT — PAIN DESCRIPTION - PROGRESSION: CLINICAL_PROGRESSION: NOT CHANGED

## 2021-01-28 NOTE — ED NOTES
ED nurse-to-nurse bedside report    Chief Complaint   Patient presents with    Shoulder Pain     right      LOC: alert and orientated to name, place, date  Vital signs   Vitals:    01/28/21 0942 01/28/21 1057   BP: 119/72 123/79   Pulse: 58 55   Resp: 18 18   Temp: 97.8 °F (36.6 °C)    TempSrc: Oral    SpO2: 98% 99%      Pain:    Pain Interventions: toradol, lidocaine patch, norco, solumedrol  Pain Goal: 4/10  Oxygen: No    Current needs required room air   Telemetry: No  LDAs:    Continuous Infusions:   Mobility: Independent  [place] Fall Risk Score: No flowsheet data found.   Fall Interventions: call light  Report given to: Herve White RN  01/28/21 3289

## 2021-01-28 NOTE — ED PROVIDER NOTES
[facility] Emergency Department    CHIEF COMPLAINT       Chief Complaint   Patient presents with    Shoulder Pain     right       Nurses Notes reviewed and I agree except as noted in the HPI. HISTORY OF PRESENT ILLNESS    Kristin Narvaez xiomara 36 y.o. female who presents to the ED for evaluation of left shoulder pain. Patient states she had a PICC line in her left upper extremity for about 3 months for history of potassium deficiency. She has a history of bariatric surgery she not absorbing potassium appropriately. She started to have pain towards the end of that 3 months. She figured it would get better once the PICC line was removed, and never did. Got extremely worse on Friday. She notes worsening pain especially with any movement. She describes the pain as a throbbing sensation inside of her shoulder. She notes she went and saw an outside hospital ER on Monday, she states they performed an x-ray and ultrasound and a CAT scan of her shoulder. She is unsure of what the diagnosis was. She was prescribed Zanaflex Voltaren gel lidocaine cream and tramadol. She notes the day after she was there she was feeling better but now she is feeling much worse again. She denies numbness or weakness to her right upper extremity. She notes some tingling. She has been wearing a sling she notes some stiffness. She denies chest pain fever chills. She denies nausea vomiting diarrhea she denies dizziness lightheadedness headache. HPI was provided by the patient. REVIEW OF SYSTEMS     Review of Systems   Constitutional: Negative for activity change, chills and fever. Respiratory: Negative for chest tightness and shortness of breath. Cardiovascular: Negative for chest pain. Gastrointestinal: Negative for abdominal distention, nausea and vomiting. Musculoskeletal: Positive for arthralgias and myalgias. Negative for gait problem, joint swelling, neck pain and neck stiffness.    Skin: Negative for color change and rash. Allergic/Immunologic: Negative for immunocompromised state. Neurological: Negative for tremors, weakness and numbness. Hematological: Does not bruise/bleed easily. Psychiatric/Behavioral: Negative for agitation, behavioral problems and confusion. PAST MEDICAL HISTORY     Past Medical History:   Diagnosis Date    Anemia     Gastroesophageal reflux disease 10/19/2020    Headache     Hypothyroidism     Low back pain 10/19/2020    Mixed anxiety depressive disorder 10/19/2020    Mixed hyperlipidemia 10/19/2020    Obesity        SURGICALHISTORY      has a past surgical history that includes Hysterectomy; Colon surgery;  section; and Sleeve Gastrectomy. CURRENT MEDICATIONS       Discharge Medication List as of 2021  1:04 PM      CONTINUE these medications which have NOT CHANGED    Details   tiZANidine (ZANAFLEX) 2 MG tablet Take 1 tablet by mouth every 8 hours as needed (muscle spasm), Disp-20 tablet, R-0Normal      diclofenac sodium (VOLTAREN) 1 % GEL Apply 2 g topically 2 times daily To shoulder, Topical, 2 TIMES DAILY Starting Mon 2021, Disp-150 g, R-1, Normal      lidocaine (LMX) 4 % cream Apply3 times daily to shoudler topically as needed. , Disp-1 Tube, R-2, Normal      escitalopram (LEXAPRO) 5 MG/5ML solution Take 5 mLs by mouth daily, Disp-150 mL, R-1Normal      busPIRone (BUSPAR) 5 MG tablet Take 1 tablet by mouth 3 times daily, Disp-90 tablet, R-1Normal      aMILoride (MIDAMOR) 5 MG tablet Take 1 tablet by mouth daily, Disp-30 tablet, R-3Normal      levothyroxine (SYNTHROID) 125 MCG tablet take 1 tablet by mouth once daily, Disp-90 tablet,R-0Normal      potassium bicarbonate (EFFER-K) 25 MEQ disintegrating tablet Take 2 tablets by mouth 3 times daily, Disp-540 tablet,R-3Normal      famotidine (PEPCID) 20 MG tablet Take 20 mg by mouth daily Historical Med      ondansetron (ZOFRAN-ODT) 4 MG disintegrating tablet Take 1 tablet by mouth 3 times daily as needed for Nausea or Vomiting, Disp-60 tablet,R-1Normal             ALLERGIES     has No Known Allergies. FAMILY HISTORY     She indicated that her mother is alive. She indicated that her father is . She indicated that both of her sisters are alive. She indicated that the status of her maternal grandmother is unknown. She indicated that the status of her maternal grandfather is unknown.   family history includes Allergy (Severe) in her maternal grandfather; Anemia in her sister; Arrhythmia in her maternal grandfather; Arthritis in her maternal grandfather and maternal grandmother; Asthma in her maternal grandfather and sister; Cancer in her maternal grandmother; Coronary Art Dis in her maternal grandfather; Depression in her maternal grandfather; Diabetes in her maternal grandfather; Drug Abuse in her mother; Hearing Loss in her maternal grandfather and maternal grandmother; High Blood Pressure in her maternal grandfather; Lupus in her mother; Stacy Tripp / [place] in her maternal grandmother.     SOCIAL HISTORY       Social History     Socioeconomic History    Marital status:      Spouse name: Not on file    Number of children: 9    Years of education: Not on file    Highest education level: Master's degree (e.g., MA, MS, Selena, MEd, MSW, LINDA)   Occupational History    Occupation: advocacy coordinator   Social Needs    Financial resource strain: Not hard at all   Yoel-Dax insecurity     Worry: Never true     Inability: Never true   Soundsupply needs     Medical: No     Non-medical: No   Tobacco Use    Smoking status: Never Smoker    Smokeless tobacco: Never Used   Substance and Sexual Activity    Alcohol use: No    Drug use: Yes     Types: Marijuana     Comment: medical marijuana    Sexual activity: Yes     Partners: Male   Lifestyle    Physical activity     Days per week: Not on file     Minutes per session: Not on file    Stress: Not on file   Relationships    Social MDM    Patient was seen and evaluated in the emergency department, patient appears to be in no acute distress, vital signs are reviewed, no significant findings are noted. Physical exam is completed, she has significant pain to the right shoulder she is unable to move the shoulder due to pain. Passive range of motion is very limited due to pain. She is tender to the deltoid. She has some minimal tenderness to the supraspinatus and the trapezius muscles. She has no collarbone tenderness no chest wall tenderness. Her pulses are equal bilaterally. She has warm extremity no signs of decreased perfusion. Electronic medical record was reviewed, she had ultrasound, CT scan, x-ray all completed outside facility, consistent with calcific tendinitis. We obtained labs, noted hypokalemia which is chronic for this patient. Noted significant elevation in sed rate. Discussed the case with the on-call orthopedist [facility], she came and evaluated the patient. Attempted to do joint aspiration, was unsuccessful. Provided steroid and lidocaine into intra-articular space. Patient has a follow-up appointment with orthopedist tomorrow. Patient symptom improved. Discussed my findings my plan of care the patient she is amenable with discharge. While here in the emergency department maintained stable course appropriate for discharge. Medications   ketorolac (TORADOL) injection 30 mg (30 mg Intravenous Given 1/28/21 1100)   HYDROcodone-acetaminophen (NORCO) 5-325 MG per tablet 1 tablet (1 tablet Oral Given 1/28/21 1101)   methylPREDNISolone sodium (SOLU-MEDROL) injection 125 mg (125 mg Intravenous Given 1/28/21 1100)   methylPREDNISolone acetate (DEPO-MEDROL) injection 80 mg (80 mg Intramuscular Given 1/28/21 1234)   lidocaine 1 % injection 5 mL (5 mLs Intradermal Given 1/28/21 1234)       Patient was seenindependently by myself. The patient's final impression and disposition and plan was determined by myself. CRITICAL CARE:   None    CONSULTS:  None    PROCEDURES:  None    FINAL IMPRESSION     1. Calcific tendonitis of right shoulder          DISPOSITION/PLAN   Patient discharged in stable condition  PATIENT REFERREDTO:  [address]  [address] [facility] [phone]  Call in 1 day  For follow up and evaluation      DISCHARGE MEDICATIONS:  Discharge Medication List as of 1/28/2021  1:04 PM      START taking these medications    Details   HYDROcodone-acetaminophen (NORCO) 5-325 MG per tablet Take 1 tablet by mouth every 6 hours as needed for Pain for up to 3 days. Intended supply: 3 days. Take lowest dose possible to manage pain, Disp-12 tablet, R-0Print             (Please note that portions of this note were completed with a voice recognition program.  Efforts were made to edit the dictations but occasionally words are mis-transcribed.)      Provider:  I personally performed the services described in the documentation,reviewed and edited the documentation which was dictated to the scribe in my presence, and it accurately records my words and actions.     Jayme Cho CNP 01/28/21 7:54 PM    Lovena Boas Counts, APRN - [place]        [employer], PELON - CNP  01/28/21 1954

## 2021-01-28 NOTE — ED NOTES
Upon first contact with patient this RN receives bedside shift report from [employer]. Pt resting on cot in stable condition. Pt states pain is still 9/10. Informed pt that medications were just administered and may still need time to kick in. Call light in reach.  Will continue to monitor      Annette Wagner RN  01/28/21 7908

## 2021-01-28 NOTE — CONSULTS
Orthopedic Consult    Requesting Physician:  Eliseo oglesby CNP    CHIEF COMPLAINT: Right shoulder pain    HISTORY OF PRESENT ILLNESS:      The patient is a right-hand-dominant 36 y.o. female  who presents for increasing right shoulder pain. Patient has an insidious onset of right shoulder pain without any trauma. She states that 2 weeks ago she developed the [facility] and some initial lab work. Discharged home with some tramadol, Zanaflex, and Voltaren gel. She was sent home and encouraged to follow-up at [facility] further care however the pain medicines were not helping her shoulder so she thought it best to be reevaluated today. Denies fever and chills, numbness and tingling. Pain in her right shoulder is nonradiating. She does have increased pain with any kind of forward flexion and motion. She notes that it is burning in nature. Patient has a history of gastric sleeve and is unable to take any NSAIDs or steroids orally. She was given 125 mg of Depo-Medrol IV here in the ER and Norco which did help relieve her pain. She was also given Toradol 30 mg IV as well. She notes a similar incident several years ago where she developed pain in her left foot he said the foot got extremely swollen and very tender to any pressure eventually subsided on its own. CT from an outlying facility shows calcific tendinosis of the infraspinatus and supraspinatus near the greater tuberosity.   Past Medical History:    Past Medical History:   Diagnosis Date    Anemia     Gastroesophageal reflux disease 10/19/2020    Headache     Hypothyroidism     Low back pain 10/19/2020    Mixed anxiety depressive disorder 10/19/2020    Mixed hyperlipidemia 10/19/2020    Obesity        Past Surgical History:    Past Surgical History:   Procedure Laterality Date     SECTION      COLON SURGERY      HYSTERECTOMY      SLEEVE GASTRECTOMY         Medications Prior to Admission:   Current Facility-Administered Medications   Medication Dose Route Frequency Provider Last Rate Last Admin    lidocaine 4 % external patch 1 patch  1 patch Transdermal Daily Jayme Cho APRN - CNP   1 patch at 01/28/21 1055    methylPREDNISolone acetate (DEPO-MEDROL) injection 80 mg  80 mg Intramuscular Once Apolonia Bame, APRN - CNP        lidocaine 1 % injection 5 mL  5 mL Intradermal Once Apolonia Bame, APRN - CNP         Current Outpatient Medications   Medication Sig Dispense Refill    tiZANidine (ZANAFLEX) 2 MG tablet Take 1 tablet by mouth every 8 hours as needed (muscle spasm) 20 tablet 0    diclofenac sodium (VOLTAREN) 1 % GEL Apply 2 g topically 2 times daily To shoulder 150 g 1    lidocaine (LMX) 4 % cream Apply3 times daily to shoudler topically as needed. 1 Tube 2    escitalopram (LEXAPRO) 5 MG/5ML solution Take 5 mLs by mouth daily (Patient not taking: Reported on 1/25/2021) 150 mL 1    busPIRone (BUSPAR) 5 MG tablet Take 1 tablet by mouth 3 times daily (Patient not taking: Reported on 1/25/2021) 90 tablet 1    aMILoride (MIDAMOR) 5 MG tablet Take 1 tablet by mouth daily 30 tablet 3    levothyroxine (SYNTHROID) 125 MCG tablet take 1 tablet by mouth once daily 90 tablet 0    potassium bicarbonate (EFFER-K) 25 MEQ disintegrating tablet Take 2 tablets by mouth 3 times daily 540 tablet 3    famotidine (PEPCID) 20 MG tablet Take 20 mg by mouth daily       ondansetron (ZOFRAN-ODT) 4 MG disintegrating tablet Take 1 tablet by mouth 3 times daily as needed for Nausea or Vomiting 60 tablet 1       Allergies:  Patient has no known allergies.     Social History:   Social History     Tobacco Use   Smoking Status Never Smoker   Smokeless Tobacco Never Used     Social History     Substance and Sexual Activity   Alcohol Use No     Social History     Substance and Sexual Activity   Drug Use Yes    Types: Marijuana    Comment: medical marijuana       Family History:  Family History   Problem Relation Age of Onset    Anemia Sister     Asthma Sister     Arthritis Maternal Grandmother     Cancer Maternal Grandmother     Hearing Loss Maternal Grandmother     Miscarriages / Stillbirths Maternal Grandmother     Allergy (Severe) Maternal Grandfather     Arthritis Maternal Grandfather     Arrhythmia Maternal Grandfather     Asthma Maternal Grandfather     Coronary Art Dis Maternal Grandfather     Depression Maternal Grandfather     Diabetes Maternal Grandfather     Hearing Loss Maternal Grandfather     High Blood Pressure Maternal Grandfather     Lupus Mother     Drug Abuse Mother        REVIEW OF SYSTEMS:  Constitutional: Denies any fever, chills. Derm: Denies any rash or skin color change. Eyes: Denies blurred or decreased in vision. Ent: Denies any tinnitus or vertigo. Resp: Denies any cough or shortness of breath. CV: Denies any syncope, palpitations or chest pain. GI:  Denies any abdominal pain, nausea, vomiting, constipation or diarrhea. : Denies any hematuria, hesitancy, or dysuria. Heme/Lymph: Denies any bleeding. Musculoskeletal: Positive for right shoulder pain  Neuro: Denies any dizziness, paresthesia or weakness. PHYSICAL EXAM:  Patient Vitals for the past 24 hrs:   BP Temp Temp src Pulse Resp SpO2   01/28/21 1057 123/79 -- -- 55 18 99 %   01/28/21 0942 119/72 97.8 °F (36.6 °C) Oral 58 18 98 %     General appearance:  Alert and oriented x 3. No apparent distress, appears stated age and cooperative. HEENT:  Normal cephalic, atraumatic without obvious deformity. Conjunctivae/corneas clear. Neck: Supple, with full range of motion. Respiratory:  Normal respiratory effort. No audible Wheezes or Rhonchi. Cardiovascular:  Regular rate and rhythm. Abdomen: Soft, non-tender, non-distended. Musculoskeletal: Right upper extremity tenderness to palpation the bicipital groove. Skin is intact. There is minimal joint effusion. There is no associated erythema, or warmth. Cy's exam is positive active range of motion she can get to about 90 degrees of forward flexion and 50 degrees of external rotation. Unable to perform any other provocative exams secondary to her extreme pain. Radial pulses 2+. Sensation is intact to light touch. Compartments are soft  Skin: Skin color, texture, turgor normal.  No rashes or lesions. Neurologic:  Neurovascularly intact without any focal sensory/motor deficits. Sensation intact. DATA:  CBC:   Lab Results   Component Value Date    WBC 9.9 01/28/2021    HGB 12.3 01/28/2021     01/28/2021     BMP:    Lab Results   Component Value Date     01/28/2021    K 3.1 01/28/2021     01/28/2021    CO2 28 01/28/2021    BUN 18 01/28/2021    CREATININE 0.5 01/28/2021    CALCIUM 9.7 01/28/2021    GLUCOSE 93 01/28/2021    GLUCOSE 92 07/13/2018     PT/INR:    Lab Results   Component Value Date    PROTIME 12.1 07/13/2018    INR 1.05 07/13/2018     Troponin:  No results found for: TROPONINI  No results for input(s): LIPASE, AMYLASE in the last 72 hours. No results for input(s): AST, ALT, BILIDIR, BILITOT, ALKPHOS in the last 72 hours. Radiology:   No results found. ASSESSMENT:Active Problems:    * No active hospital problems. *  Resolved Problems:    * No resolved hospital problems. *     Right shoulder rotator cuff tendonitis. Possible gout flare right shoulder    PLAN as discussed with Dr. Betty Quiroz:  Nonsurgical plan at this time. We will try to aspirate fluid off of the shoulder today while in the emergency department. I will also give her a cortisone injection into the right shoulder. Uric acid level. Pending lab work she can be discharged  today with follow-up out of [place] tomorrow or Monday in office call walk-in clinic. Procedure:  Right shoulder aspiration with cortisone injection:  Right shoulder identified, verbal consent received.  The shoulder was cleansed with alcohol and under sterile technique a needle was inserted, no fluid was able to be aspirated. 80 mg of DepoMedrol with 2 cc Lidocaine 1% was injected into the SA bursa. Hemostasis obtained. Patient tolerated with a moderate amount of pain.      Electronically signed by PELON Ayers CNP on 1/28/2021 at 12:18 PM

## 2021-01-29 ENCOUNTER — TELEPHONE (OUTPATIENT)
Dept: FAMILY MEDICINE CLINIC | Age: 41
End: 2021-01-29

## 2021-02-23 LAB
BASOPHILS ABSOLUTE: NORMAL
BASOPHILS RELATIVE PERCENT: NORMAL
BUN BLDV-MCNC: 18 MG/DL
CALCIUM SERPL-MCNC: 9.2 MG/DL
CHLORIDE BLD-SCNC: 104 MMOL/L
CO2: 28 MMOL/L
CREAT SERPL-MCNC: 0.8 MG/DL
EOSINOPHILS ABSOLUTE: NORMAL
EOSINOPHILS RELATIVE PERCENT: NORMAL
GFR CALCULATED: 84
GLUCOSE BLD-MCNC: 110 MG/DL
HCT VFR BLD CALC: 39.1 % (ref 36–46)
HEMOGLOBIN: 13.1 G/DL (ref 12–16)
LYMPHOCYTES ABSOLUTE: NORMAL
LYMPHOCYTES RELATIVE PERCENT: NORMAL
MCH RBC QN AUTO: NORMAL PG
MCHC RBC AUTO-ENTMCNC: NORMAL G/DL
MCV RBC AUTO: NORMAL FL
MONOCYTES ABSOLUTE: NORMAL
MONOCYTES RELATIVE PERCENT: NORMAL
NEUTROPHILS ABSOLUTE: NORMAL
NEUTROPHILS RELATIVE PERCENT: NORMAL
PLATELET # BLD: 215 K/ΜL
PMV BLD AUTO: NORMAL FL
POTASSIUM SERPL-SCNC: 3 MMOL/L
RBC # BLD: 4.03 10^6/ΜL
SODIUM BLD-SCNC: 142 MMOL/L
TSH SERPL DL<=0.05 MIU/L-ACNC: 0.62 UIU/ML
URIC ACID: 4.3
WBC # BLD: 5.02 10^3/ML

## 2021-02-26 ENCOUNTER — VIRTUAL VISIT (OUTPATIENT)
Dept: PSYCHIATRY | Age: 41
End: 2021-02-26
Payer: COMMERCIAL

## 2021-02-26 DIAGNOSIS — F41.1 GENERALIZED ANXIETY DISORDER: ICD-10-CM

## 2021-02-26 DIAGNOSIS — F33.1 MODERATE EPISODE OF RECURRENT MAJOR DEPRESSIVE DISORDER (HCC): Primary | ICD-10-CM

## 2021-02-26 PROCEDURE — 1036F TOBACCO NON-USER: CPT | Performed by: NURSE PRACTITIONER

## 2021-02-26 PROCEDURE — G8484 FLU IMMUNIZE NO ADMIN: HCPCS | Performed by: NURSE PRACTITIONER

## 2021-02-26 PROCEDURE — 99214 OFFICE O/P EST MOD 30 MIN: CPT | Performed by: NURSE PRACTITIONER

## 2021-02-26 PROCEDURE — G8427 DOCREV CUR MEDS BY ELIG CLIN: HCPCS | Performed by: NURSE PRACTITIONER

## 2021-02-26 PROCEDURE — 90833 PSYTX W PT W E/M 30 MIN: CPT | Performed by: NURSE PRACTITIONER

## 2021-02-26 PROCEDURE — G8417 CALC BMI ABV UP PARAM F/U: HCPCS | Performed by: NURSE PRACTITIONER

## 2021-02-26 RX ORDER — ESCITALOPRAM OXALATE 5 MG/5ML
10 SOLUTION ORAL DAILY
Qty: 300 ML | Refills: 0 | Status: SHIPPED | OUTPATIENT
Start: 2021-02-26 | End: 2021-03-29 | Stop reason: DRUGHIGH

## 2021-02-26 NOTE — PROGRESS NOTES
[address] [place] [id number]  Dept: [phone]  Dept Fax: [phone]: [phone]    Visit Date: 2/26/2021    TELEPSYCHIATRY VISIT -- Audio/Visual (During [id number]-[id number] public health emergency)     Ana María Rosales is a 36 y.o. female being evaluated by a Virtual Visit (video visit) encounter to address concerns as mentioned  below. A caregiver was present when appropriate. Pursuant to the emergency declaration under the [address] [place] [address], [address] [place] [address] authority and the [employer] Act, this Virtual Visit was conducted with patient's (and/or legal guardian's) consent, to reduce the patient's risk of exposure to COVID-19 and provide necessary medical care. The patient (and/or legal guardian) has also been advised to contact this office for worsening conditions or problems, and seek emergency medical treatment and/or call 911 if deemed necessary. Services were provided through a video synchronous discussion virtually to substitute for in-person clinic visit. Patient and provider were located at their individual homes. SUBJECTIVE DATA     CHIEF COMPLAINT:    Chief Complaint   Patient presents with    Depression    Anxiety    Follow-up       History obtained from: patient    HISTORY OF PRESENT ILLNESS:    Ana María Rosales is a 36 y.o. female who presents via virtual video visit for follow-up on complaints of depression and anxiety.      Has been on the Lexapro 5mg for about 2.5 weeks  -she is taking it at night  -she is able to take the medication without difficulty  -not having the nausea or vomiting like she was previously  -has noticed some increased irritability     Mood is unchanged  -no worsening of mood    Potassium level is still fluctuating  -notices she feels differently when her level is low    States her dietary intake fluctuates with her mood  -when she is stressed she works more and then [employer]" to eat  -states she knows she hasn't been eating the best for the last week    Seeing her therapist weekly  -last appointment was yesterday  -they are \"working on some really hard\" and she believes \"this is manifesting in some other ways\"  -has noticed some increased irritability and \"aggressiveness\" with her words since starting the intensive counseling    States she has had some odd and abnormal increased and uncontrolled pain since the intensive counseling  -this also occurred about 5 days after she began a [employer] class  -states \"when I'm having a rough time then I feel like my pain tolerance is down. \"    There are continued stressors in her relationship with her mother. Denies suicidal ideations, intent, plan. No homicidal ideations, intent, plan. No audiovisual hallucinations. HPI    The patient has had 1 lifetime suicide attempts. Methods used for the suicide attempts include overdose on medications. The patient's most recent suicide attempt was 2007. Patient reports 1 psych hospital admissions with the last admission taking place 2007. NOTE: Patient states she did attempt suicide as a teenager but does not recall the number of times, methods or number of hospitalizations. The above information reflects SA as an adult.     Past psychiatric medications include: \"too many to list\" Prozac, Zoloft, Celexa, Cymbalta, BuSpar, Wellbutrin, Trintellix, Paxil    Adverse reactions from psychotropic medications:  None      Current Psychiatric Review of Systems         Jazlyn or Hypomania:  no     Panic Attacks:  no     Phobias:  no     Obsessions and Compulsions:  no     Body or Vocal Tics:  no     Hallucinations:  no     Delusions:  no    SOCIAL HISTORY:  Patient was born in [place] and raised by her maternal grandparents      Social History     Socioeconomic History    Marital status:      Spouse name: Not on file    Number of children: 7    Years of education: Not on file    Highest education level: Master's degree (e.g., MA, MS, Selena, MEd, MSW, LINDA)   Occupational History    Occupation: advocacy coordinator   Social Needs    Financial resource strain: Not hard at all   [place] insecurity     Worry: Never true     Inability: Never true   [employer] needs     Medical: No     Non-medical: No   Tobacco Use    Smoking status: Never Smoker    Smokeless tobacco: Never Used   Substance and Sexual Activity    Alcohol use: No    Drug use: Yes     Types: Marijuana     Comment: medical marijuana    Sexual activity: Yes     Partners: Male   Lifestyle    Physical activity     Days per week: Not on file     Minutes per session: Not on file    Stress: Not on file   Relationships    Social connections     Talks on phone: Not on file     Gets together: Not on file     Attends Faith service: Not on file     Active member of club or organization: Not on file     Attends meetings of clubs or organizations: Not on file     Relationship status: Not on file    Intimate partner violence     Fear of current or ex partner: Not on file     Emotionally abused: Not on file     Physically abused: Not on file     Forced sexual activity: Not on file   Other Topics Concern    Not on file   Social History Narrative    12/9/2020    LEVEL OF EDUCATION: graduated high school; earned her bachelor degrees in both social work and criminal justice; earned her master's degree social work    SPECIAL EDUCATION NEEDS: None    RESIDENCE: Currently lives with her children; mom; and her sister and sister's children are now also living in the home (there are a total of 15 people living in the home)    LEGAL HISTORY: None    Christianity: None    TRAUMA: Yes - does not want to disclose about this at this time    : None    HOBBIES: crafts; spending time with her children    EMPLOYMENT: 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 She has been in this position since . MARRIAGES: two. The first marriage was in  and they  after 1.5 years. Second marriage lasted until 2016. They are  after 15 years. CHILDREN: 7     SUBSTANCE USE:    1. Marijuana: medical marijuana - using 2 or 3 times per week. Tried it in high school but didn't start using the medical marijuana until Aug. 2020.  Admits she has been using more recently because she has more stressors       FAMILY HISTORY:   Family History   Problem Relation Age of Onset    Anemia Sister     Asthma Sister     Arthritis Maternal Grandmother     Cancer Maternal Grandmother     Hearing Loss Maternal Grandmother     Miscarriages / Stillbirths Maternal Grandmother     Allergy (Severe) Maternal Grandfather     Arthritis Maternal Grandfather     Arrhythmia Maternal Grandfather     Asthma Maternal Grandfather     Coronary Art Dis Maternal Grandfather     Depression Maternal Grandfather     Diabetes Maternal Grandfather     Hearing Loss Maternal Grandfather     High Blood Pressure Maternal Grandfather     Lupus Mother     Drug Abuse Mother        Psychiatric Family History  Son has \"mental health issues\"    PAST MEDICAL HISTORY:    Past Medical History:   Diagnosis Date    Anemia     Gastroesophageal reflux disease 10/19/2020    Headache     Hypothyroidism     Low back pain 10/19/2020    Mixed anxiety depressive disorder 10/19/2020    Mixed hyperlipidemia 10/19/2020    Obesity        PAST SURGICAL HISTORY:    Past Surgical History:   Procedure Laterality Date     SECTION      COLON SURGERY      HYSTERECTOMY      SLEEVE GASTRECTOMY         PREVIOUSMEDICATIONS:  Outpatient Medications Prior to Visit   Medication Sig Dispense Refill    tiZANidine (ZANAFLEX) 2 MG tablet Take 1 tablet by mouth every 8 hours as needed (muscle spasm) 20 tablet 0    diclofenac sodium (VOLTAREN) 1 % GEL Apply 2 g topically 2 times daily To shoulder 150 g 1    lidocaine (LMX) 4 % cream Apply3 times daily to shoudler topically as needed. 1 Tube 2    escitalopram (LEXAPRO) 5 MG/5ML solution Take 5 mLs by mouth daily (Patient not taking: Reported on 1/25/2021) 150 mL 1    busPIRone (BUSPAR) 5 MG tablet Take 1 tablet by mouth 3 times daily (Patient not taking: Reported on 1/25/2021) 90 tablet 1    aMILoride (MIDAMOR) 5 MG tablet Take 1 tablet by mouth daily 30 tablet 3    levothyroxine (SYNTHROID) 125 MCG tablet take 1 tablet by mouth once daily 90 tablet 0    potassium bicarbonate (EFFER-K) 25 MEQ disintegrating tablet Take 2 tablets by mouth 3 times daily 540 tablet 3    famotidine (PEPCID) 20 MG tablet Take 20 mg by mouth daily       ondansetron (ZOFRAN-ODT) 4 MG disintegrating tablet Take 1 tablet by mouth 3 times daily as needed for Nausea or Vomiting 60 tablet 1     No facility-administered medications prior to visit. ALLERGIES:    Patient has no known allergies. REVIEW OF SYSTEMS:    Review of Systems    The patient sees PELON Estrada CNP as her primary care provider. SPECIALISTS: nephrology for the hypokalemia; bariatric surgeon (Dr. Antony Mustafa)    OBJECTIVE DATA     There were no vitals taken for this visit. Physical Exam    Mental Status Evaluation:   Orientation: Alert, oriented, thought content appropriate   Mood:. Anxious, Depressed and Irritable      Affect:  Mood Congruent      Appearance:  Age Appropriate, Casually Dressed, Clean, Well Groomed, Clothing Appropriate for Age and Clothing Appropriate for Weather   Activity:  Cooperative, Good Eye Contact and Seated Calmly   Gait/Posture: Normal   Speech:  Clear, Fluent, Normal Pitch and Volume, Age and Situation Appropriate   Thought Process: Within Normal Limits   Thought Content:   Within Normal Limits   Cognition:  Grossly Intact   Memory: Intact   Insight:  Good   Judgment: Good   Suicidal Ideations: Denies Suicidal Ideation   Homicidal Ideations: Negative for homicidal ideation   Medication Side Effects: Absent       Attention Span Attention span and concentration were age appropriate       Screenings Completed in This Encounter:     Anxiety and Depression:                    DIAGNOSIS AND ASSESSMENT DATA     DIAGNOSIS:   1. Moderate episode of recurrent major depressive disorder ([facility] [address]    2. Generalized anxiety disorder      R/O Personality Disorder    PLAN   Follow-up:  Return in about 4 weeks (around 3/26/2021), or if symptoms worsen or fail to improve, for follow-up and medication management. Prescriptions for this encounter:  New Prescriptions    No medications on file       Orders Placed This Encounter   Medications    escitalopram (LEXAPRO) 5 MG/5ML solution     Sig: Take 10 mLs by mouth daily     Dispense:  300 mL     Refill:  0       Medications Discontinued During This Encounter   Medication Reason    escitalopram (LEXAPRO) 5 MG/5ML solution DOSE ADJUSTMENT       Additional orders:  No orders of the defined types were placed in this encounter. Patient is tolerating the Lexapro well thus far. Symptoms are persisting as would be expected as she has not been on the medication for a little over 2 weeks. Treatment options reviewed at length. Patient will increase to Lexapro 10mg daily to address mood symptoms. Supportive therapy provided. Patient is encouraged to utilize nonpharmacologic coping skills such as deep breathing, guided imagery, guided meditation, muscle relaxation, calming music, and/or journaling. Risks, potential side effects, possibledrug-drug interactions, benefits and alternate treatments discussed in detail. All questions answered. Patient stated understanding and is agreeable to treatment plan. Patient has been instructed to seek emergency help via the emergency and/or calling 911 should symptoms become severe, worsen, or with other concerning symptoms.  Patient instructed to goimmediately to the emergency room and/or call 911 with any suicidal or homicidal ideations or if audio/visual hallucinations develop  Patient stated understanding and agrees. Patient given crisis center information. I spent a total of 30 minutes with the patient in counseling regarding topics discussed above. Utilized CBT, MI, and psychoeducation to address topics above. Patient was engaged and responsive throughout session. The remainder of session was spent on symptom evaluation and medication management. Provider Signature:  Electronically signed by PELON Watson CNP on 2/26/2021 at 8:50 AM    **This report has been created using voice recognition software. It may contain minor errors which are inherent in voice recognition technology. **

## 2021-03-02 RX ORDER — AMILORIDE HYDROCHLORIDE 5 MG/1
5 TABLET ORAL 2 TIMES DAILY
Qty: 60 TABLET | Refills: 3 | Status: SHIPPED | OUTPATIENT
Start: 2021-03-02 | End: 2021-06-02 | Stop reason: SDUPTHER

## 2021-03-03 ENCOUNTER — OFFICE VISIT (OUTPATIENT)
Dept: NEPHROLOGY | Age: 41
End: 2021-03-03
Payer: COMMERCIAL

## 2021-03-03 VITALS
OXYGEN SATURATION: 98 % | BODY MASS INDEX: 27.88 KG/M2 | HEART RATE: 70 BPM | WEIGHT: 150 LBS | DIASTOLIC BLOOD PRESSURE: 69 MMHG | SYSTOLIC BLOOD PRESSURE: 98 MMHG | TEMPERATURE: 97.9 F

## 2021-03-03 DIAGNOSIS — E87.6 HYPOKALEMIA: Primary | ICD-10-CM

## 2021-03-03 PROCEDURE — G8427 DOCREV CUR MEDS BY ELIG CLIN: HCPCS | Performed by: INTERNAL MEDICINE

## 2021-03-03 PROCEDURE — G8484 FLU IMMUNIZE NO ADMIN: HCPCS | Performed by: INTERNAL MEDICINE

## 2021-03-03 PROCEDURE — 1036F TOBACCO NON-USER: CPT | Performed by: INTERNAL MEDICINE

## 2021-03-03 PROCEDURE — 99213 OFFICE O/P EST LOW 20 MIN: CPT | Performed by: INTERNAL MEDICINE

## 2021-03-03 PROCEDURE — G8417 CALC BMI ABV UP PARAM F/U: HCPCS | Performed by: INTERNAL MEDICINE

## 2021-03-03 NOTE — PROGRESS NOTES
guarding,   Extremities: No sig LE edema, no cyanosis  Skin: warm, dry  Neurologic: no tremor, no asterixis, no focal neurologic deficits     Medications:  Current Outpatient Medications   Medication Sig Dispense Refill    aMILoride (MIDAMOR) 5 MG tablet Take 1 tablet by mouth 2 times daily 60 tablet 3    escitalopram (LEXAPRO) 5 MG/5ML solution Take 10 mLs by mouth daily 300 mL 0    tiZANidine (ZANAFLEX) 2 MG tablet Take 1 tablet by mouth every 8 hours as needed (muscle spasm) 20 tablet 0    diclofenac sodium (VOLTAREN) 1 % GEL Apply 2 g topically 2 times daily To shoulder 150 g 1    lidocaine (LMX) 4 % cream Apply3 times daily to shoudler topically as needed. 1 Tube 2    busPIRone (BUSPAR) 5 MG tablet Take 1 tablet by mouth 3 times daily 90 tablet 1    levothyroxine (SYNTHROID) 125 MCG tablet take 1 tablet by mouth once daily 90 tablet 0    potassium bicarbonate (EFFER-K) 25 MEQ disintegrating tablet Take 2 tablets by mouth 3 times daily 540 tablet 3    famotidine (PEPCID) 20 MG tablet Take 20 mg by mouth daily       ondansetron (ZOFRAN-ODT) 4 MG disintegrating tablet Take 1 tablet by mouth 3 times daily as needed for Nausea or Vomiting 60 tablet 1     No current facility-administered medications for this visit.          Laboratory & Diagnostics:  CBC:   Lab Results   Component Value Date    WBC 5.02 02/23/2021    HGB 13.1 02/23/2021    HCT 39.1 02/23/2021    MCV 97.1 01/28/2021     02/23/2021     BMP:    Lab Results   Component Value Date     02/23/2021     01/28/2021     12/18/2020    K 3.0 02/23/2021    K 3.1 (L) 01/28/2021    K 3.4 01/11/2021     02/23/2021     01/28/2021     12/18/2020    CO2 28 02/23/2021    CO2 28 01/28/2021    CO2 23 12/18/2020    BUN 18 02/23/2021    BUN 18 01/28/2021    BUN 14 12/18/2020    CREATININE 0.8 02/23/2021    CREATININE 0.5 01/28/2021    CREATININE 0.9 12/18/2020    GLUCOSE 110 02/23/2021    GLUCOSE 93 01/28/2021    GLUCOSE 87 12/18/2020      Hepatic:   Lab Results   Component Value Date    AST 19 02/18/2019    AST 26 07/13/2018    AST 20 09/01/2017    ALT 13 02/18/2019    ALT 24 07/13/2018    ALT 12 09/01/2017    BILITOT 0.3 02/18/2019    BILITOT 0.4 07/13/2018    BILITOT 0.4 09/01/2017    ALKPHOS 27 (L) 02/18/2019    ALKPHOS 20 (L) 07/13/2018    ALKPHOS 24 (L) 09/01/2017     BNP: No results found for: BNP  Lipids:   Lab Results   Component Value Date    CHOL 201 (H) 02/18/2019    HDL 50 02/18/2019     INR:   Lab Results   Component Value Date    INR 1.05 07/13/2018     URINE:   Lab Results   Component Value Date    NAUR 78 10/25/2020    NAUR 78 10/25/2020    PROTUR Negative 07/13/2018     Lab Results   Component Value Date    NITRU Negative 07/13/2018    COLORU Yellow 07/13/2018    WBCUA 0-5 07/13/2018    RBCUA 3-5 07/13/2018    MUCUS Present 07/13/2018    LEUKOCYTESUR Negative 07/13/2018    UROBILINOGEN <2.0 E.U./dL 07/13/2018    BILIRUBINUR Negative 07/13/2018    GLUCOSEU Negative 07/13/2018    KETUA Negative 07/13/2018      Microalbumen/Creatinine ratio:  No components found for: RUCREAT        Impression/Plan:   1. Hypokalemia: has some renal potassium wasting. Very unlikely to sporadically develop a Barters or gitelmans syndrome after her gastric sleeve surgery. I think the explanation is renal potassium wasting due to upper GI losses causing a metabolic alkalosis and subsequent urinary potassium loss. -on amiloride will increase to 5 mg BID  -noncompliant with potassium. Discussed trying to at least get it in BID. EFfer-K 25 meq bid. Repeat K next week and monthly          Bloodwork and medications were reviewed and plan of care discussed with the patient. Return to clinic in 3 months or sooner if the need arises.       Eliezer Grigsby, DO  [facility]

## 2021-03-29 ENCOUNTER — VIRTUAL VISIT (OUTPATIENT)
Dept: PSYCHIATRY | Age: 41
End: 2021-03-29
Payer: COMMERCIAL

## 2021-03-29 DIAGNOSIS — F41.1 GENERALIZED ANXIETY DISORDER: ICD-10-CM

## 2021-03-29 DIAGNOSIS — F33.1 MODERATE EPISODE OF RECURRENT MAJOR DEPRESSIVE DISORDER (HCC): Primary | ICD-10-CM

## 2021-03-29 PROCEDURE — 99214 OFFICE O/P EST MOD 30 MIN: CPT | Performed by: NURSE PRACTITIONER

## 2021-03-29 PROCEDURE — G8427 DOCREV CUR MEDS BY ELIG CLIN: HCPCS | Performed by: NURSE PRACTITIONER

## 2021-03-29 PROCEDURE — G8417 CALC BMI ABV UP PARAM F/U: HCPCS | Performed by: NURSE PRACTITIONER

## 2021-03-29 PROCEDURE — 1036F TOBACCO NON-USER: CPT | Performed by: NURSE PRACTITIONER

## 2021-03-29 PROCEDURE — G8484 FLU IMMUNIZE NO ADMIN: HCPCS | Performed by: NURSE PRACTITIONER

## 2021-03-29 PROCEDURE — 90833 PSYTX W PT W E/M 30 MIN: CPT | Performed by: NURSE PRACTITIONER

## 2021-03-29 RX ORDER — BUSPIRONE HYDROCHLORIDE 7.5 MG/1
7.5 TABLET ORAL 3 TIMES DAILY
Qty: 90 TABLET | Refills: 1 | Status: SHIPPED | OUTPATIENT
Start: 2021-03-29 | End: 2021-05-07 | Stop reason: SDUPTHER

## 2021-03-29 RX ORDER — ESCITALOPRAM OXALATE 5 MG/5ML
15 SOLUTION ORAL DAILY
Qty: 450 ML | Refills: 1 | Status: SHIPPED | OUTPATIENT
Start: 2021-03-29 | End: 2021-05-07 | Stop reason: ALTCHOICE

## 2021-03-29 NOTE — PROGRESS NOTES
has \"zero\" tolerance    Endorses continued feelings of depression and anxiety. -worries a lot  -racing thoughts  -feeling down and sad  -on edge  -short tempered    Sister moved out last week  -this has been great for the patient  -states she didn't like her sister's dog but got along well with her sister  -states they are very close and she knows their relationship will be fine  -sister had lived with patient and patient's family for 6 months    Another stressor is her boyfriend will be brining his children to the home for 3 days. States \"I like control\"  -\"I don't like when people tell me what to do\"    She cannot leave her house until everything is in order.  -if things aren't in order she feels anxious    She is learning to address and manage stressors with her mother differently  -she is setting some more boundaries with her mother  -trying to establish healthier coping skills    Attends counseling every week  -states it is very helpful    Denies suicidal ideations, intent, plan. No homicidal ideations, intent, plan. No audiovisual hallucinations. HPI    The patient has had 1 lifetime suicide attempts. Methods used for the suicide attempts include overdose on medications. The patient's most recent suicide attempt was 2007. Patient reports 1 psych hospital admissions with the last admission taking place 2007. NOTE: Patient states she did attempt suicide as a teenager but does not recall the number of times, methods or number of hospitalizations. The above information reflects SA as an adult.     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 She has been in this position since . MARRIAGES: two. The first marriage was in  and they  after 1.5 years. Second marriage lasted until 2016. They are  after 15 years. CHILDREN: 7     SUBSTANCE USE:    1. Marijuana: medical marijuana - using 2 or 3 times per week. Tried it in high school but didn't start using the medical marijuana until Aug. 2020.  Admits she has been using more recently because she has more stressors       FAMILY HISTORY:   Family History   Problem Relation Age of Onset    Anemia Sister     Asthma Sister     Arthritis Maternal Grandmother     Cancer Maternal Grandmother     Hearing Loss Maternal Grandmother     Miscarriages / Stillbirths Maternal Grandmother     Allergy (Severe) Maternal Grandfather     Arthritis Maternal Grandfather     Arrhythmia Maternal Grandfather     Asthma Maternal Grandfather     Coronary Art Dis Maternal Grandfather     Depression Maternal Grandfather     Diabetes Maternal Grandfather     Hearing Loss Maternal Grandfather     High Blood Pressure Maternal Grandfather     Lupus Mother     Drug Abuse Mother        Psychiatric Family History  Son has \"mental health issues\"    PAST MEDICAL HISTORY:    Past Medical History:   Diagnosis Date    Anemia     Gastroesophageal reflux disease 10/19/2020    Headache     Hypothyroidism     Low back pain 10/19/2020    Mixed anxiety depressive disorder 10/19/2020    Mixed hyperlipidemia 10/19/2020    Obesity        PAST SURGICAL HISTORY:    Past Surgical History:   Procedure Laterality Date     SECTION      COLON SURGERY      HYSTERECTOMY      SLEEVE GASTRECTOMY         PREVIOUSMEDICATIONS:  Outpatient Medications Prior to Visit   Medication Sig Dispense Refill    aMILoride (MIDAMOR) 5 MG tablet Take 1 tablet by mouth 2 times daily 60 tablet 3    tiZANidine (ZANAFLEX) 2 MG tablet Take 1 tablet by mouth every 8 hours as needed (muscle spasm) 20 tablet 0    diclofenac sodium (VOLTAREN) 1 % GEL Apply 2 g topically 2 times daily To shoulder 150 g 1    lidocaine (LMX) 4 % cream Apply3 times daily to shoudler topically as needed. 1 Tube 2    levothyroxine (SYNTHROID) 125 MCG tablet take 1 tablet by mouth once daily 90 tablet 0    potassium bicarbonate (EFFER-K) 25 MEQ disintegrating tablet Take 2 tablets by mouth 3 times daily 540 tablet 3    famotidine (PEPCID) 20 MG tablet Take 20 mg by mouth daily       ondansetron (ZOFRAN-ODT) 4 MG disintegrating tablet Take 1 tablet by mouth 3 times daily as needed for Nausea or Vomiting 60 tablet 1    escitalopram (LEXAPRO) 5 MG/5ML solution Take 10 mLs by mouth daily 300 mL 0    busPIRone (BUSPAR) 5 MG tablet Take 1 tablet by mouth 3 times daily 90 tablet 1     No facility-administered medications prior to visit. ALLERGIES:    Patient has no known allergies. REVIEW OF SYSTEMS:    Review of Systems    The patient sees PELON Still CNP as her primary care provider. SPECIALISTS: nephrology for the hypokalemia; bariatric surgeon (Dr. Herve Fernandez)    OBJECTIVE DATA     There were no vitals taken for this visit. Physical Exam    Mental Status Evaluation:   Orientation: Alert, oriented, thought content appropriate   Mood:. Anxious, Depressed and Irritable      Affect:  Mood Congruent      Appearance:  Age Appropriate, Casually Dressed, Clean, Well Groomed, Clothing Appropriate for Age and Clothing Appropriate for Weather   Activity:  Cooperative, Good Eye Contact and Seated Calmly   Gait/Posture: Normal   Speech:  Clear, Fluent, Normal Pitch and Volume, Age and Situation Appropriate   Thought Process: Within Normal Limits   Thought Content:   Within Normal Limits   Cognition:  Grossly Intact   Memory: Intact   Insight:  Good   Judgment: Good   Suicidal Ideations: Denies Suicidal Ideation   Homicidal Ideations: Negative for homicidal ideation   Medication Side Effects: Absent       Attention Span Attention span and concentration were age appropriate       Screenings Completed in This Encounter:     Anxiety and Depression:                    DIAGNOSIS AND ASSESSMENT DATA     DIAGNOSIS:   1. Moderate episode of recurrent major depressive disorder ([facility] [address]    2. Generalized anxiety disorder      R/O Personality Disorder    PLAN   Follow-up:  Return in about 4 weeks (around 4/26/2021), or if symptoms worsen or fail to improve, for follow-up and medication management. Prescriptions for this encounter:  New Prescriptions    No medications on file       Orders Placed This Encounter   Medications    escitalopram (LEXAPRO) 5 MG/5ML solution     Sig: Take 15 mLs by mouth daily     Dispense:  450 mL     Refill:  1    busPIRone (BUSPAR) 7.5 MG tablet     Sig: Take 1 tablet by mouth 3 times daily     Dispense:  90 tablet     Refill:  1       Medications Discontinued During This Encounter   Medication Reason    busPIRone (BUSPAR) 5 MG tablet DOSE ADJUSTMENT    escitalopram (LEXAPRO) 5 MG/5ML solution DOSE ADJUSTMENT       Additional orders:  No orders of the defined types were placed in this encounter. Patient is tolerating the Lexapro well thus far. Symptoms are persisting. Treatment options reviewed at length. Patient will increase to Lexapro 15mg daily to address mood symptoms. She will also increase to BuSpar 7.5mg TID for the anxiety symptoms. Patient is encouraged to continue with individual counseling. Discussed importance of trauma based therapy in the overall management of her mood. Supportive therapy provided. Patient is encouraged to utilize nonpharmacologic coping skills such as deep breathing, guided imagery, guided meditation, muscle relaxation, calming music, and/or journaling. Risks, potential side effects, possibledrug-drug interactions, benefits and alternate treatments discussed in detail. All questions answered.  Patient stated understanding and is agreeable to treatment plan. Patient has been instructed to seek emergency help via the emergency and/or calling 911 should symptoms become severe, worsen, or with other concerning symptoms. Patient instructed to goimmediately to the emergency room and/or call 911 with any suicidal or homicidal ideations or if audio/visual hallucinations develop  Patient stated understanding and agrees. Patient given crisis center information. I spent a total of 30 minutes with the patient in counseling regarding topics discussed above. Utilized CBT, MI, and psychoeducation to address topics above. Patient was engaged and responsive throughout session. The remainder of session was spent on symptom evaluation and medication management. Provider Signature:  Electronically signed by PELON Croft CNP on 3/29/2021 at 9:54 AM    **This report has been created using voice recognition software. It may contain minor errors which are inherent in voice recognition technology. **

## 2021-04-06 LAB
MAGNESIUM: 2 MG/DL
POTASSIUM (K+): 3.2

## 2021-05-07 ENCOUNTER — VIRTUAL VISIT (OUTPATIENT)
Dept: PSYCHIATRY | Age: 41
End: 2021-05-07
Payer: COMMERCIAL

## 2021-05-07 DIAGNOSIS — F33.1 MODERATE EPISODE OF RECURRENT MAJOR DEPRESSIVE DISORDER (HCC): Primary | ICD-10-CM

## 2021-05-07 DIAGNOSIS — F41.1 GENERALIZED ANXIETY DISORDER: ICD-10-CM

## 2021-05-07 PROCEDURE — G8427 DOCREV CUR MEDS BY ELIG CLIN: HCPCS | Performed by: NURSE PRACTITIONER

## 2021-05-07 PROCEDURE — 99213 OFFICE O/P EST LOW 20 MIN: CPT | Performed by: NURSE PRACTITIONER

## 2021-05-07 PROCEDURE — 1036F TOBACCO NON-USER: CPT | Performed by: NURSE PRACTITIONER

## 2021-05-07 PROCEDURE — G8417 CALC BMI ABV UP PARAM F/U: HCPCS | Performed by: NURSE PRACTITIONER

## 2021-05-07 RX ORDER — BUSPIRONE HYDROCHLORIDE 7.5 MG/1
7.5 TABLET ORAL 3 TIMES DAILY
Qty: 90 TABLET | Refills: 1 | Status: SHIPPED | OUTPATIENT
Start: 2021-05-07 | End: 2021-06-25 | Stop reason: DRUGHIGH

## 2021-05-07 RX ORDER — ESCITALOPRAM OXALATE 10 MG/1
10 TABLET ORAL DAILY
Qty: 30 TABLET | Refills: 1 | Status: SHIPPED | OUTPATIENT
Start: 2021-05-07 | End: 2021-05-24 | Stop reason: HOSPADM

## 2021-05-07 NOTE — PROGRESS NOTES
[address] [place] [address]  Dept: [phone]  Dept Fax: [phone]: [phone]    Visit Date: 5/7/2021    TELEPSYCHIATRY VISIT -- Audio/Visual (During KSLJD-29 public health emergency)     Sri Lee is a 36 y.o. female being evaluated by a Virtual Visit (video visit) encounter to address concerns as mentioned  below. A caregiver was present when appropriate. Pursuant to the emergency declaration under the [address] [place] [address], [address] [place] [address] authority and the [employer] Act, this Virtual Visit was conducted with patient's (and/or legal guardian's) consent, to reduce the patient's risk of exposure to COVID-19 and provide necessary medical care. The patient (and/or legal guardian) has also been advised to contact this office for worsening conditions or problems, and seek emergency medical treatment and/or call 911 if deemed necessary. Services were provided through a video synchronous discussion virtually to substitute for in-person clinic visit. Patient was at her dental office and provider was at her individual homes. SUBJECTIVE DATA     CHIEF COMPLAINT:    Chief Complaint   Patient presents with    Depression    Anxiety    Follow-up       History obtained from: patient    HISTORY OF PRESENT ILLNESS:    Sri Lee is a 36 y.o. female who presents via virtual video visit for follow-up on complaints of depression and anxiety. Patient initially states she is doing well overall. She then admits her mood has not been as well controlled as she would like.     Sleeping well overall    States her time off work was \"terrible\"  -states \"I made it through\"  -states \"I hate being still\"    Not taking the Lexapro x2 weeks  -wants to change back to tablet     Not sleeping well  Some impulsivity    Skipped counseling for several weeks  -has since returned  -they have discussed possible cyclothymia    Has to keep appointment quick due to dental appointment at the same time. Denies suicidal ideations, intent, plan. No homicidal ideations, intent, plan. No audiovisual hallucinations. PRIOR:  States she is in a bad mood today.  -she is supposed to be on vacation this week but Billy Alatorre is more stressful for me than being at work. \"  -states her mother continues to be a large stressor in her life  -prefers to be at work rather than \"at home trying to entertain people\"  -states \"I don't do well with down time\"  -states \"I'm mean. I'm just plain mean. \"  -reports she has \"zero\" tolerance    Endorses continued feelings of depression and anxiety. -worries a lot  -racing thoughts  -feeling down and sad  -on edge  -short tempered    Sister moved out last week  -this has been great for the patient  -states she didn't like her sister's dog but got along well with her sister  -states they are very close and she knows their relationship will be fine  -sister had lived with patient and patient's family for 6 months    Another stressor is her boyfriend will be brining his children to the home for 3 days. States \"I like control\"  -\"I don't like when people tell me what to do\"    She cannot leave her house until everything is in order.  -if things aren't in order she feels anxious    She is learning to address and manage stressors with her mother differently  -she is setting some more boundaries with her mother  -trying to establish healthier coping skills    Attends counseling every week  -states it is very helpful    Denies suicidal ideations, intent, plan. No homicidal ideations, intent, plan. No audiovisual hallucinations. HPI    The patient has had 1 lifetime suicide attempts. Methods used for the suicide attempts include overdose on medications. The patient's most recent suicide attempt was 2007.     Patient reports 1 psych hospital admissions with the last admission taking place 2007. NOTE: Patient states she did attempt suicide as a teenager but does not recall the number of times, methods or number of hospitalizations. The above information reflects SA as an adult.     Past psychiatric medications include: \"too many to list\" Prozac, Zoloft, Celexa, Cymbalta, BuSpar, Wellbutrin, Trintellix, Paxil    Adverse reactions from psychotropic medications:  None      Current Psychiatric Review of Systems         Jazlyn or Hypomania:  no     Panic Attacks:  no     Phobias:  no     Obsessions and Compulsions:  no     Body or Vocal Tics:  no     Hallucinations:  no     Delusions:  no    SOCIAL HISTORY:  Patient was born in [place] and raised by her maternal grandparents      Social History     Socioeconomic History    Marital status:      Spouse name: Not on file    Number of children: 9    Years of education: Not on file    Highest education level: Master's degree (e.g., MA, MS, Selena, MEd, MSW, LINDA)   Occupational History    Occupation: advocacy coordinator   Social Needs    Financial resource strain: Not hard at all   [employer] insecurity     Worry: Never true     Inability: Never true    Transportation needs     Medical: No     Non-medical: No   Tobacco Use    Smoking status: Never Smoker    Smokeless tobacco: Never Used   Substance and Sexual Activity    Alcohol use: No    Drug use: Yes     Types: Marijuana     Comment: medical marijuana    Sexual activity: Yes     Partners: Male   Lifestyle    Physical activity     Days per week: Not on file     Minutes per session: Not on file    Stress: Not on file   Relationships    Social connections     Talks on phone: Not on file     Gets together: Not on file     Attends Religion service: Not on file     Active member of club or organization: Not on file     Attends meetings of clubs or organizations: Not on file     Relationship status: Not on file    Intimate partner violence     Fear of current or Medical History:   Diagnosis Date    Anemia     Gastroesophageal reflux disease 10/19/2020    Headache     Hypothyroidism     Low back pain 10/19/2020    Mixed anxiety depressive disorder 10/19/2020    Mixed hyperlipidemia 10/19/2020    Obesity        PAST SURGICAL HISTORY:    Past Surgical History:   Procedure Laterality Date     SECTION      COLON SURGERY      HYSTERECTOMY      SLEEVE GASTRECTOMY         PREVIOUSMEDICATIONS:  Outpatient Medications Prior to Visit   Medication Sig Dispense Refill    escitalopram (LEXAPRO) 5 MG/5ML solution Take 15 mLs by mouth daily 450 mL 1    busPIRone (BUSPAR) 7.5 MG tablet Take 1 tablet by mouth 3 times daily 90 tablet 1    aMILoride (MIDAMOR) 5 MG tablet Take 1 tablet by mouth 2 times daily 60 tablet 3    tiZANidine (ZANAFLEX) 2 MG tablet Take 1 tablet by mouth every 8 hours as needed (muscle spasm) 20 tablet 0    diclofenac sodium (VOLTAREN) 1 % GEL Apply 2 g topically 2 times daily To shoulder 150 g 1    lidocaine (LMX) 4 % cream Apply3 times daily to shoudler topically as needed. 1 Tube 2    levothyroxine (SYNTHROID) 125 MCG tablet take 1 tablet by mouth once daily 90 tablet 0    potassium bicarbonate (EFFER-K) 25 MEQ disintegrating tablet Take 2 tablets by mouth 3 times daily 540 tablet 3    famotidine (PEPCID) 20 MG tablet Take 20 mg by mouth daily       ondansetron (ZOFRAN-ODT) 4 MG disintegrating tablet Take 1 tablet by mouth 3 times daily as needed for Nausea or Vomiting 60 tablet 1     No facility-administered medications prior to visit. ALLERGIES:    Patient has no known allergies. REVIEW OF SYSTEMS:    Review of Systems    The patient sees PELON Yanes CNP as her primary care provider. SPECIALISTS: nephrology for the hypokalemia; bariatric surgeon (Dr. Jeremy Alarcon)    OBJECTIVE DATA     There were no vitals taken for this visit.     Physical Exam    Mental Status Evaluation:   Orientation: Alert, oriented, thought content appropriate   Mood:. Anxious, Depressed and Irritable      Affect:  Normal      Appearance:  Age Appropriate, Casually Dressed, Clean, Well Groomed, Clothing Appropriate for Age and Clothing Appropriate for Weather   Activity:  Cooperative, Good Eye Contact and Seated Calmly   Gait/Posture: Normal   Speech:  Clear, Fluent, Normal Pitch and Volume, Age and Situation Appropriate   Thought Process: Within Normal Limits   Thought Content: Within Normal Limits   Cognition:  Grossly Intact   Memory: Intact   Insight:  Good   Judgment: Good   Suicidal Ideations: Denies Suicidal Ideation   Homicidal Ideations: Negative for homicidal ideation   Medication Side Effects: Absent       Attention Span Attention span and concentration were age appropriate       Screenings Completed in This Encounter:     Anxiety and Depression:                    DIAGNOSIS AND ASSESSMENT DATA     DIAGNOSIS:   1. Moderate episode of recurrent major depressive disorder ([facility] [address]    2. Generalized anxiety disorder      R/O Personality Disorder    PLAN   Follow-up:  Return in about 2 weeks (around 5/21/2021), or if symptoms worsen or fail to improve, for follow-up and medication management. Prescriptions for this encounter:  New Prescriptions    No medications on file       Orders Placed This Encounter   Medications    escitalopram (LEXAPRO) 10 MG tablet     Sig: Take 1 tablet by mouth daily     Dispense:  30 tablet     Refill:  1    busPIRone (BUSPAR) 7.5 MG tablet     Sig: Take 1 tablet by mouth 3 times daily     Dispense:  90 tablet     Refill:  1       Medications Discontinued During This Encounter   Medication Reason    escitalopram (LEXAPRO) 5 MG/5ML solution Alternate therapy    busPIRone (BUSPAR) 7.5 MG tablet REORDER       Additional orders:  No orders of the defined types were placed in this encounter. Patient will restart the Lexapro with Lexapro 10mg tablet. Continue BuSpar.   Patient is encouraged to continue with

## 2021-05-10 RX ORDER — LEVOTHYROXINE SODIUM 0.12 MG/1
TABLET ORAL
Qty: 90 TABLET | Refills: 0 | Status: SHIPPED | OUTPATIENT
Start: 2021-05-10 | End: 2021-10-08

## 2021-05-24 ENCOUNTER — VIRTUAL VISIT (OUTPATIENT)
Dept: PSYCHIATRY | Age: 41
End: 2021-05-24
Payer: COMMERCIAL

## 2021-05-24 DIAGNOSIS — F41.1 GENERALIZED ANXIETY DISORDER: ICD-10-CM

## 2021-05-24 DIAGNOSIS — F33.1 MODERATE EPISODE OF RECURRENT MAJOR DEPRESSIVE DISORDER (HCC): Primary | ICD-10-CM

## 2021-05-24 DIAGNOSIS — F14.10 COCAINE ABUSE (HCC): ICD-10-CM

## 2021-05-24 PROCEDURE — G8417 CALC BMI ABV UP PARAM F/U: HCPCS | Performed by: NURSE PRACTITIONER

## 2021-05-24 PROCEDURE — 99214 OFFICE O/P EST MOD 30 MIN: CPT | Performed by: NURSE PRACTITIONER

## 2021-05-24 PROCEDURE — 90833 PSYTX W PT W E/M 30 MIN: CPT | Performed by: NURSE PRACTITIONER

## 2021-05-24 PROCEDURE — G8427 DOCREV CUR MEDS BY ELIG CLIN: HCPCS | Performed by: NURSE PRACTITIONER

## 2021-05-24 PROCEDURE — 1036F TOBACCO NON-USER: CPT | Performed by: NURSE PRACTITIONER

## 2021-05-24 RX ORDER — LAMOTRIGINE 25 MG/1
TABLET ORAL
Qty: 42 TABLET | Refills: 0 | Status: SHIPPED | OUTPATIENT
Start: 2021-05-24 | End: 2021-06-25 | Stop reason: DRUGHIGH

## 2021-05-24 NOTE — PROGRESS NOTES
[address] [place] [address] Suleman Carrizales [facility] [id number]  Dept: [phone]  Dept Fax: [phone]: [phone]    Visit Date: 5/24/2021    TELEPSYCHIATRY VISIT -- Audio/Visual (During [facility] public health emergency)     Yusuf Betts is a 36 y.o. female being evaluated by a Virtual Visit (video visit) encounter to address concerns as mentioned  below. A caregiver was present when appropriate. Pursuant to the emergency declaration under the [address] [place] [address], [address] [place] [address] authority and the [employer] Act, this Virtual Visit was conducted with patient's (and/or legal guardian's) consent, to reduce the patient's risk of exposure to COVID-19 and provide necessary medical care. The patient (and/or legal guardian) has also been advised to contact this office for worsening conditions or problems, and seek emergency medical treatment and/or call 911 if deemed necessary. Services were provided through a video synchronous discussion virtually to substitute for in-person clinic visit. Patient was at her dental office and provider was at her individual homes. SUBJECTIVE DATA     CHIEF COMPLAINT:    Chief Complaint   Patient presents with    Depression    Anxiety    Follow-up       History obtained from: patient    HISTORY OF PRESENT ILLNESS:    Yusuf Betts is a 36 y.o. female who presents via virtual video visit for follow-up on complaints of depression and anxiety.      Continues to work to avoid feelings    Taking the BuSpar  -it makes her feel sweating and hot  -it helps a lot with her anxiety  -she doesn't want to stop the medication    Before weight loss surgery she was doing very well and was stable    States all of her medications make her ill     States her bariatric surgeon still won't listen    Sometimes skips her medication doses because she doesn't feel well    Increased stressors recently  -two children graduated this weekend    Continues in counseling  -states \"we are doing some hard things\" in counseling    trying to respond rather than react  Her irritability level is coming down    Dealing better with her mother      Reports some impulsivity  -has done cocaine a few times in the last couple of weeks; had tried it for the first time at age 25  states she doesn't like tell this provider information    States the high risk sexual behavior was originally her idea, but Daniela Mills is a little too pushy about it. \"  -swingers and multiple partners at once    Concerned she may be a sex addict but also thinks she craves attention  -has used sex as a coping mechanism  -she craves feeling wanted, so often turns to sex      Reports significant mood shifts - impulsivity, high risk behaviors elevated but still able to function; sleep agree    Denies suicidal ideations, intent, plan. No homicidal ideations, intent, plan. No audiovisual hallucinations. HPI    The patient has had 1 lifetime suicide attempts. Methods used for the suicide attempts include overdose on medications. The patient's most recent suicide attempt was 2007. Patient reports 1 psych hospital admissions with the last admission taking place 2007. NOTE: Patient states she did attempt suicide as a teenager but does not recall the number of times, methods or number of hospitalizations. The above information reflects SA as an adult.     Past psychiatric medications include: \"too many to list\" Prozac, Zoloft, Celexa, Cymbalta, BuSpar, Wellbutrin, Trintellix, Paxil    Adverse reactions from psychotropic medications:  None      Current Psychiatric Review of Systems         Jazlyn or Hypomania:  no     Panic Attacks:  no     Phobias:  no     Obsessions and Compulsions:  no     Body or Vocal Tics:  no     Hallucinations:  no     Delusions:  no    SOCIAL HISTORY:  Patient was born in [place] and raised by her maternal grandparents      Social History     Socioeconomic History    Marital status:      Spouse name: Not on file    Number of children: 9    Years of education: Not on file    Highest education level: Master's degree (e.g., MA, MS, Selena, MEd, MSW, LINDA)   Occupational History    Occupation: advocacy coordinator   Tobacco Use    Smoking status: Never Smoker    Smokeless tobacco: Never Used   Vaping Use    Vaping Use: Never used   Substance and Sexual Activity    Alcohol use: No    Drug use: Yes     Types: Marijuana     Comment: medical marijuana    Sexual activity: Yes     Partners: Male   Other Topics Concern    Not on file   Social History Narrative    05/24/2021    LEVEL OF EDUCATION: graduated high school; earned her bachelor degrees in both social work and criminal justice; earned her master's degree social work    SPECIAL EDUCATION NEEDS: None    RESIDENCE: Currently lives with her children; mom    LEGAL HISTORY: None    Evangelical: None    TRAUMA: Yes - does not want to disclose about this at this time    : None    HOBBIES: crafts; spending time with her children    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 MARRIAGES: two. The first marriage was in 2004 and they  after 1.5 years. Second marriage lasted until 2016. They are  after 15 years. CHILDREN: 7     SUBSTANCE USE:    1. Marijuana: medical marijuana - using 2 or 3 times per week. Tried it in high school but didn't start using the medical marijuana until Aug. 2020. Admits she has been using more recently because she has more stressors    2.  Cocaine: last use was [place] couple weeks ago\"     Social Determinants of Health     Financial Resource Strain: Low Risk     Difficulty of Paying Living Expenses: Not hard at all   Food Insecurity: No Food Insecurity    Worried About Running Out of Food in the Last Year: Never true  Ran Out of Food in the Last Year: Never true   Transportation Needs: No Transportation Needs    Lack of Transportation (Medical): No    Lack of Transportation (Non-Medical):  No   Physical Activity:     Days of Exercise per Week:     Minutes of Exercise per Session:    Stress:     Feeling of Stress :    Social Connections:     Frequency of Communication with Friends and Family:     Frequency of Social Gatherings with Friends and Family:     Attends Protestant Services:     Active Member of Clubs or Organizations:     Attends Club or Organization Meetings:     Marital Status:    Intimate Partner Violence:     Fear of Current or Ex-Partner:     Emotionally Abused:     Physically Abused:     Sexually Abused:        FAMILY HISTORY:   Family History   Problem Relation Age of Onset    Anemia Sister     Asthma Sister     Arthritis Maternal Grandmother     Cancer Maternal Grandmother     Hearing Loss Maternal Grandmother     Miscarriages / Stillbirths Maternal Grandmother     Allergy (Severe) Maternal Grandfather     Arthritis Maternal Grandfather     Arrhythmia Maternal Grandfather     Asthma Maternal Grandfather     Coronary Art Dis Maternal Grandfather     Depression Maternal Grandfather     Diabetes Maternal Grandfather     Hearing Loss Maternal Grandfather     High Blood Pressure Maternal Grandfather     Lupus Mother     Drug Abuse Mother        Psychiatric Family History  Son has \"mental health issues\"    PAST MEDICAL HISTORY:    Past Medical History:   Diagnosis Date    Anemia     Gastroesophageal reflux disease 10/19/2020    Headache     Hypothyroidism     Low back pain 10/19/2020    Mixed anxiety depressive disorder 10/19/2020    Mixed hyperlipidemia 10/19/2020    Obesity        PAST SURGICAL HISTORY:    Past Surgical History:   Procedure Laterality Date     SECTION      COLON SURGERY      HYSTERECTOMY      SLEEVE GASTRECTOMY         PREVIOUSMEDICATIONS: Volume, Age and Situation Appropriate   Thought Process: Within Normal Limits   Thought Content: Within Normal Limits   Cognition:  Grossly Intact   Memory: Intact   Insight:  Good   Judgment: Good   Suicidal Ideations: Denies Suicidal Ideation   Homicidal Ideations: Negative for homicidal ideation   Medication Side Effects: Absent       Attention Span Attention span and concentration were age appropriate       Screenings Completed in This Encounter:     Anxiety and Depression:                    DIAGNOSIS AND ASSESSMENT DATA     DIAGNOSIS:   1. Moderate episode of recurrent major depressive disorder ([facility] [address]    2. Generalized anxiety disorder    3. Cocaine abuse ([facility])      R/O Personality Disorder, Cyclothymia,     PLAN   Follow-up:  No follow-ups on file. Prescriptions for this encounter:  New Prescriptions    LAMOTRIGINE (LAMICTAL) 25 MG TABLET    Take 1 tablet by mouth once daily for 2 weeks. Then take 2 tablets by mouth once daily. Orders Placed This Encounter   Medications    lamoTRIgine (LAMICTAL) 25 MG tablet     Sig: Take 1 tablet by mouth once daily for 2 weeks. Then take 2 tablets by mouth once daily. Dispense:  42 tablet     Refill:  0       Medications Discontinued During This Encounter   Medication Reason    escitalopram (LEXAPRO) 10 MG tablet Stop Taking at Discharge       Additional orders:  No orders of the defined types were placed in this encounter. Patient will stop Lexapro and instead start Lamictal. She is stopping the Lexapro because she is reporting she is not tolerating it well. Starting Lamictal to address impulsive behaviors and mood dysregulation. Patient is encouraged to continue with individual counseling. Discussed importance of trauma based therapy in the overall management of her mood. She is encouraged to refrain from use of cocaine. Also reviewed healthy relationships and healthy sexual relationships. Supportive therapy provided.  Patient is encouraged to utilize nonpharmacologic coping skills such as deep breathing, guided imagery, guided meditation, muscle relaxation, calming music, and/or journaling. Risks, potential side effects, possibledrug-drug interactions, benefits and alternate treatments discussed in detail. All questions answered. Patient stated understanding and is agreeable to treatment plan. Patient has been instructed to seek emergency help via the emergency and/or calling 911 should symptoms become severe, worsen, or with other concerning symptoms. Patient instructed to goimmediately to the emergency room and/or call 911 with any suicidal or homicidal ideations or if audio/visual hallucinations develop  Patient stated understanding and agrees. Patient given crisis center information. Spent 25 min with patient in counseling regarding topics above. Utilized CBT, MI and psychoeducation to address topics. Patient engaged and responsive throughout session. The remainder of session was spent on symptom evaluation and medication management. Provider Signature:  Electronically signed by PELON Payan CNP on 5/24/2021 at 12:52 PM    **This report has been created using voice recognition software. It may contain minor errors which are inherent in voice recognition technology. **

## 2021-05-26 ENCOUNTER — OFFICE VISIT (OUTPATIENT)
Dept: FAMILY MEDICINE CLINIC | Age: 41
End: 2021-05-26
Payer: COMMERCIAL

## 2021-05-26 VITALS
HEIGHT: 61 IN | TEMPERATURE: 98.6 F | BODY MASS INDEX: 25.98 KG/M2 | HEART RATE: 56 BPM | WEIGHT: 137.6 LBS | OXYGEN SATURATION: 99 % | SYSTOLIC BLOOD PRESSURE: 122 MMHG | DIASTOLIC BLOOD PRESSURE: 58 MMHG

## 2021-05-26 DIAGNOSIS — Z90.710 HISTORY OF HYSTERECTOMY: ICD-10-CM

## 2021-05-26 DIAGNOSIS — Z98.891 HISTORY OF CESAREAN SECTION: ICD-10-CM

## 2021-05-26 DIAGNOSIS — Z87.828 HISTORY OF BLUNT TRAUMA TO ABDOMEN: ICD-10-CM

## 2021-05-26 DIAGNOSIS — Z98.890 S/P GASTRIC SURGERY: ICD-10-CM

## 2021-05-26 DIAGNOSIS — Z90.49 HISTORY OF BOWEL RESECTION: ICD-10-CM

## 2021-05-26 DIAGNOSIS — R10.2 PELVIC PRESSURE IN FEMALE: ICD-10-CM

## 2021-05-26 DIAGNOSIS — N94.10 DYSPAREUNIA, FEMALE: Primary | ICD-10-CM

## 2021-05-26 PROCEDURE — 1036F TOBACCO NON-USER: CPT | Performed by: NURSE PRACTITIONER

## 2021-05-26 PROCEDURE — G8417 CALC BMI ABV UP PARAM F/U: HCPCS | Performed by: NURSE PRACTITIONER

## 2021-05-26 PROCEDURE — 99214 OFFICE O/P EST MOD 30 MIN: CPT | Performed by: NURSE PRACTITIONER

## 2021-05-26 PROCEDURE — G8427 DOCREV CUR MEDS BY ELIG CLIN: HCPCS | Performed by: NURSE PRACTITIONER

## 2021-05-26 NOTE — PATIENT INSTRUCTIONS
Patient Education        Painful Sex: Care Instructions  Your Care Instructions     Painful sex can be caused by many things. You may have an injury, an infection, or a growth in your vagina. Or maybe you have muscle spasms. In some cases, the pain is caused by another medical condition, such as a spinal problem. Some medicines can cause dryness in the vagina. And as a woman gets older, her vagina gets drier. It may also narrow, shorten, and get stiffer. This dryness can make sex painful. Talk to your doctor about what might be causing your painful sex. Treatment may help. Follow-up care is a key part of your treatment and safety. Be sure to make and go to all appointments, and call your doctor if you are having problems. It's also a good idea to know your test results and keep a list of the medicines you take. How can you care for yourself at home? · Use a vaginal lubricant during sex. Examples are Astroglide, K-Y Jelly, and Wet Gel Lubricant. · Increase the time you and your partner spend touching each other before sex. This is called foreplay. · Try different positions for sex to find the most comfortable ones. · Ask your doctor about exercises to strengthen and relax your pelvic muscles. · Before sex, take a warm bath. This can relax you and reduce anxiety. · If your doctor prescribes any medicines, take them exactly as prescribed. Call your doctor if you think you are having a problem with your medicine. When should you call for help? Watch closely for changes in your health, and be sure to contact your doctor if you have any problems. Where can you learn more? Go to https://deep.[employer]. org and sign in to your [employer] account. Enter H675 in the [employer] box to learn more about \"Painful Sex: Care Instructions. \"     If you do not have an account, please click on the \"Sign Up Now\" link.   Current as of: July 17, 2020               Content Version: 12.8  © [id number]

## 2021-05-28 NOTE — PROGRESS NOTES
[address] [place] [address]  Dept: [phone]  Dept Fax: [phone]  Loc: [phone]  PROGRESS NOTE      VisitDate: 5/26/2021    Mason Cuellar is a 39 y.o. female who presents today for:     Chief Complaint   Patient presents with    Pain     pain during and after intercourse          Subjective:  Patient presents with pelvic pain lower abdominal pain associated during and after intercourse. Complains of symptoms over the past several months. Has tried multiple lubricants. Reports recent negative exam per her OB/GYN. Reports that she is in a monogamous relationship. Patient has lost a significant amount of weight since her gastric sleeve procedure last year. She was told by her GYN that he could possibly be adhesions from her previous hysterectomy, multiple C-sections, gastric sleeve procedure. Denies any vaginal discharge. Denies any excessive vaginal dryness. Denies any vaginal bleeding. Reports that the pain is internal.  Rates pain 10 out of 10 at its most intense at times. Reports that she is attempted multiple sexual position changes without benefit. Patient denies any vaginal pain. Denies any constipation or blood in stools. Reports that she has a small bowel movement every 2 to 3 days without strain. Reports stools are soft. Denies any dysuria or urinary difficulties. History of hypothyroidism, anxiety/depression, gastric sleeve, GERD, headaches, hyperlipidemia previous obesity. Reports mood stable doing well. Review of Systems   Genitourinary: Positive for dyspareunia and pelvic pain. Negative for dysuria, enuresis, vaginal bleeding, vaginal discharge and vaginal pain. Psychiatric/Behavioral: Positive for decreased concentration and dysphoric mood. Negative for hallucinations and suicidal ideas. The patient is nervous/anxious.       Past Medical History:   Diagnosis Date    Anemia     Gastroesophageal reflux disease 10/19/2020    Headache     Hypothyroidism     Low back pain 10/19/2020    Mixed anxiety depressive disorder 10/19/2020    Mixed hyperlipidemia 10/19/2020    Obesity       Past Surgical History:   Procedure Laterality Date     SECTION      COLON SURGERY      HYSTERECTOMY      SLEEVE GASTRECTOMY       Family History   Problem Relation Age of Onset    Anemia Sister     Asthma Sister     Arthritis Maternal Grandmother     Cancer Maternal Grandmother     Hearing Loss Maternal Grandmother     Miscarriages / Stillbirths Maternal Grandmother     Allergy (Severe) Maternal Grandfather     Arthritis Maternal Grandfather     Arrhythmia Maternal Grandfather     Asthma Maternal Grandfather     Coronary Art Dis Maternal Grandfather     Depression Maternal Grandfather     Diabetes Maternal Grandfather     Hearing Loss Maternal Grandfather     High Blood Pressure Maternal Grandfather     Lupus Mother     Drug Abuse Mother      Social History     Tobacco Use    Smoking status: Never Smoker    Smokeless tobacco: Never Used   Substance Use Topics    Alcohol use: No      Current Outpatient Medications   Medication Sig Dispense Refill    lamoTRIgine (LAMICTAL) 25 MG tablet Take 1 tablet by mouth once daily for 2 weeks. Then take 2 tablets by mouth once daily. 42 tablet 0    levothyroxine (SYNTHROID) 125 MCG tablet take 1 tablet by mouth once daily 90 tablet 0    busPIRone (BUSPAR) 7.5 MG tablet Take 1 tablet by mouth 3 times daily (Patient taking differently: Take 12.5 mg by mouth daily ) 90 tablet 1    aMILoride (MIDAMOR) 5 MG tablet Take 1 tablet by mouth 2 times daily 60 tablet 3    lidocaine (LMX) 4 % cream Apply3 times daily to shoudler topically as needed.  1 Tube 2    famotidine (PEPCID) 20 MG tablet Take 20 mg by mouth daily       tiZANidine (ZANAFLEX) 2 MG tablet Take 1 tablet by mouth every 8 hours as needed (muscle spasm) (Patient not taking: Reported alert and oriented to person, place, and time. Psychiatric:         Attention and Perception: Attention normal.         Mood and Affect: Mood normal. Affect is blunt. Speech: Speech normal.         Behavior: Behavior normal. Behavior is cooperative. Thought Content: Thought content normal.         Cognition and Memory: Cognition normal.         Judgment: Judgment normal.       BP (!) 122/58   Pulse 56   Temp 98.6 °F (37 °C) (Oral)   Ht 5' 1\" (1.549 m)   Wt 137 lb 9.6 oz (62.4 kg)   SpO2 99%   BMI 26.00 kg/m²       Impression/Plan:  1. Dyspareunia, female    2. S/P gastric surgery    3. History of bowel resection    4. History of blunt trauma to abdomen    5. History of hysterectomy    6. History of  section    7. Pelvic pressure in female      Requested Prescriptions      No prescriptions requested or ordered in this encounter     Orders Placed This Encounter   Procedures    CT ABDOMEN PELVIS W WO CONTRAST Additional Contrast? Radiologist Recommendation     Standing Status:   Future     Standing Expiration Date:   2022     Order Specific Question:   Additional Contrast?     Answer:   Radiologist Recommendation       Patient giveneducational materials - see patient instructions. Discussed use, benefit, and side effects of prescribed medications. All patient questions answered. Pt voiced understanding. Reviewed health maintenance. Patient agreedwith treatment plan. Follow up as directed. CT of abdomen and pelvis ordered. Consultations pending results. Suggested the patient possibly urology GYN services may be necessary. Also suggested possible pelvic floor therapy. Continue to the use lubrication of choice.   30 minutes  Electronically signed by PELON Islas CNP on 2021 at 12:25 PM

## 2021-06-02 ENCOUNTER — OFFICE VISIT (OUTPATIENT)
Dept: NEPHROLOGY | Age: 41
End: 2021-06-02
Payer: COMMERCIAL

## 2021-06-02 VITALS
SYSTOLIC BLOOD PRESSURE: 90 MMHG | HEART RATE: 70 BPM | BODY MASS INDEX: 25.7 KG/M2 | OXYGEN SATURATION: 100 % | WEIGHT: 136 LBS | DIASTOLIC BLOOD PRESSURE: 54 MMHG

## 2021-06-02 DIAGNOSIS — E87.6 HYPOKALEMIA: Primary | ICD-10-CM

## 2021-06-02 PROCEDURE — 99213 OFFICE O/P EST LOW 20 MIN: CPT | Performed by: INTERNAL MEDICINE

## 2021-06-02 PROCEDURE — G8417 CALC BMI ABV UP PARAM F/U: HCPCS | Performed by: INTERNAL MEDICINE

## 2021-06-02 PROCEDURE — G8427 DOCREV CUR MEDS BY ELIG CLIN: HCPCS | Performed by: INTERNAL MEDICINE

## 2021-06-02 PROCEDURE — 1036F TOBACCO NON-USER: CPT | Performed by: INTERNAL MEDICINE

## 2021-06-02 RX ORDER — POTASSIUM BICARBONATE 25 MEQ/1
25 TABLET, EFFERVESCENT ORAL 3 TIMES DAILY
Qty: 90 TABLET | Refills: 3 | Status: SHIPPED | OUTPATIENT
Start: 2021-06-02 | End: 2021-10-06 | Stop reason: ALTCHOICE

## 2021-06-02 RX ORDER — AMILORIDE HYDROCHLORIDE 5 MG/1
5 TABLET ORAL 2 TIMES DAILY
Qty: 60 TABLET | Refills: 3 | Status: SHIPPED | OUTPATIENT
Start: 2021-06-02 | End: 2021-10-06 | Stop reason: SDUPTHER

## 2021-06-02 NOTE — PROGRESS NOTES
[address] [facility]  [address]  [facility] [address]  Dept: [phone]  Loc: [phone]  Progress Note  2021 9:18 AM      Pt Name:    Kortney Avelar:    1980  Primary Care Physician:  PELON Islas - CNP     Chief Complaint:   Chief Complaint   Patient presents with    Follow-up     hypokalemia         History of Present Illness: This is a follow-up visit for hypokalemia. Has not had labs drawn since April. Has only been taking Amiloride once a day instead of twice day. Only taking potassium once a day if that. BP low. Denies dizziness. C/o painful intercourse sometimes and pain with urination. States she saw gynecology and reports everything was normal.    She has hx of hypokalemia since gastric sleeve surgery in July. 24 hour urine for potassium was high at 50 meq. Pertinent items are noted in HPI. Past History:  Past Medical History:   Diagnosis Date    Anemia     Gastroesophageal reflux disease 10/19/2020    Headache     Hypothyroidism     Low back pain 10/19/2020    Mixed anxiety depressive disorder 10/19/2020    Mixed hyperlipidemia 10/19/2020    Obesity      Past Surgical History:   Procedure Laterality Date     SECTION      COLON SURGERY      HYSTERECTOMY      SLEEVE GASTRECTOMY          VITALS:  BP (!) 90/54 (Site: Right Upper Arm, Position: Sitting, Cuff Size: Large Adult) Comment: manual  Pulse 70   Wt 136 lb (61.7 kg)   SpO2 100%   BMI 25.70 kg/m²   Wt Readings from Last 3 Encounters:   21 136 lb (61.7 kg)   21 137 lb 9.6 oz (62.4 kg)   21 150 lb (68 kg)     Body mass index is 25.7 kg/m².      General Appearance: alert and cooperative with exam, appears comfortable, no distress  HEENT: EOMI, moist oral mucus membranes  Neck: No jugular venous distention,   Lungs: Air entry B/L, no crackles or rales, no use of accessory 01/28/2021    BUN 14 12/18/2020    CREATININE 0.8 02/23/2021    CREATININE 0.5 01/28/2021    CREATININE 0.9 12/18/2020    GLUCOSE 110 02/23/2021    GLUCOSE 93 01/28/2021    GLUCOSE 87 12/18/2020      Hepatic:   Lab Results   Component Value Date    AST 19 02/18/2019    AST 26 07/13/2018    AST 20 09/01/2017    ALT 13 02/18/2019    ALT 24 07/13/2018    ALT 12 09/01/2017    BILITOT 0.3 02/18/2019    BILITOT 0.4 07/13/2018    BILITOT 0.4 09/01/2017    ALKPHOS 27 (L) 02/18/2019    ALKPHOS 20 (L) 07/13/2018    ALKPHOS 24 (L) 09/01/2017     BNP: No results found for: BNP  Lipids:   Lab Results   Component Value Date    CHOL 201 (H) 02/18/2019    HDL 50 02/18/2019     INR:   Lab Results   Component Value Date    INR 1.05 07/13/2018     URINE:   Lab Results   Component Value Date    NAUR 78 10/25/2020    NAUR 78 10/25/2020    PROTUR Negative 07/13/2018     Lab Results   Component Value Date    NITRU Negative 07/13/2018    COLORU Yellow 07/13/2018    WBCUA 0-5 07/13/2018    RBCUA 3-5 07/13/2018    MUCUS Present 07/13/2018    LEUKOCYTESUR Negative 07/13/2018    UROBILINOGEN <2.0 E.U./dL 07/13/2018    BILIRUBINUR Negative 07/13/2018    GLUCOSEU Negative 07/13/2018    KETUA Negative 07/13/2018      Microalbumen/Creatinine ratio:  No components found for: RUCREAT        Impression/Plan:   1. Hypokalemia: has some renal potassium wasting. started on amiloride but only taking it once a day instead of BID. Noncompliant with potassium. Unable to take extended release due to gastric sleeve surgery. Needs updated labs discussed with patient she will get them done today, increase amiloride to 5 mg BID and will increase further if needed            Bloodwork and medications were reviewed and plan of care discussed with the patient. Return to clinic in 3 months or sooner if the need arises.       Alexandra Maxwell,   [facility]

## 2021-06-07 ENCOUNTER — TELEPHONE (OUTPATIENT)
Dept: NEPHROLOGY | Age: 41
End: 2021-06-07

## 2021-06-07 NOTE — TELEPHONE ENCOUNTER
Valdemar Stokes from scheduling called stating patient call wanting to schedule for the CT scan that Christa Sanchez ordered. Is it ok for her to schedule this?        Please Advise

## 2021-06-11 LAB
BUN BLDV-MCNC: 21 MG/DL
CALCIUM SERPL-MCNC: 9.7 MG/DL
CHLORIDE BLD-SCNC: 102 MMOL/L
CO2: 24 MMOL/L
CREAT SERPL-MCNC: 0.9 MG/DL
GFR CALCULATED: 73
GLUCOSE BLD-MCNC: 91 MG/DL
POTASSIUM SERPL-SCNC: 3.7 MMOL/L
SODIUM BLD-SCNC: 139 MMOL/L

## 2021-06-21 ENCOUNTER — PATIENT MESSAGE (OUTPATIENT)
Dept: FAMILY MEDICINE CLINIC | Age: 41
End: 2021-06-21

## 2021-06-25 ENCOUNTER — VIRTUAL VISIT (OUTPATIENT)
Dept: PSYCHIATRY | Age: 41
End: 2021-06-25
Payer: COMMERCIAL

## 2021-06-25 ENCOUNTER — HOSPITAL ENCOUNTER (OUTPATIENT)
Dept: CT IMAGING | Age: 41
Discharge: HOME | End: 2021-06-25
Payer: COMMERCIAL

## 2021-06-25 DIAGNOSIS — F41.1 GENERALIZED ANXIETY DISORDER: ICD-10-CM

## 2021-06-25 DIAGNOSIS — F33.1 MODERATE EPISODE OF RECURRENT MAJOR DEPRESSIVE DISORDER (HCC): Primary | ICD-10-CM

## 2021-06-25 DIAGNOSIS — Z90.49 HISTORY OF BOWEL RESECTION: ICD-10-CM

## 2021-06-25 DIAGNOSIS — N94.10 DYSPAREUNIA, FEMALE: ICD-10-CM

## 2021-06-25 DIAGNOSIS — Z98.891 HISTORY OF CESAREAN SECTION: ICD-10-CM

## 2021-06-25 DIAGNOSIS — Z87.828 HISTORY OF BLUNT TRAUMA TO ABDOMEN: ICD-10-CM

## 2021-06-25 DIAGNOSIS — Z90.710 HISTORY OF HYSTERECTOMY: ICD-10-CM

## 2021-06-25 DIAGNOSIS — R10.2 PELVIC PRESSURE IN FEMALE: ICD-10-CM

## 2021-06-25 PROCEDURE — 74178 CT ABD&PLV WO CNTR FLWD CNTR: CPT

## 2021-06-25 PROCEDURE — 6360000004 HC RX CONTRAST MEDICATION: Performed by: NURSE PRACTITIONER

## 2021-06-25 PROCEDURE — G8427 DOCREV CUR MEDS BY ELIG CLIN: HCPCS | Performed by: NURSE PRACTITIONER

## 2021-06-25 PROCEDURE — 90833 PSYTX W PT W E/M 30 MIN: CPT | Performed by: NURSE PRACTITIONER

## 2021-06-25 PROCEDURE — G8417 CALC BMI ABV UP PARAM F/U: HCPCS | Performed by: NURSE PRACTITIONER

## 2021-06-25 PROCEDURE — 1036F TOBACCO NON-USER: CPT | Performed by: NURSE PRACTITIONER

## 2021-06-25 PROCEDURE — 99214 OFFICE O/P EST MOD 30 MIN: CPT | Performed by: NURSE PRACTITIONER

## 2021-06-25 RX ORDER — LAMOTRIGINE 100 MG/1
100 TABLET ORAL DAILY
Qty: 30 TABLET | Refills: 1 | Status: SHIPPED | OUTPATIENT
Start: 2021-06-25 | End: 2021-07-16 | Stop reason: DRUGHIGH

## 2021-06-25 RX ORDER — BUSPIRONE HYDROCHLORIDE 7.5 MG/1
7.5 TABLET ORAL 2 TIMES DAILY
Qty: 60 TABLET | Refills: 1 | Status: SHIPPED | OUTPATIENT
Start: 2021-06-25 | End: 2021-07-16 | Stop reason: DRUGHIGH

## 2021-06-25 RX ADMIN — IOPAMIDOL 85 ML: 755 INJECTION, SOLUTION INTRAVENOUS at 09:13

## 2021-06-25 RX ADMIN — IOHEXOL 50 ML: 240 INJECTION, SOLUTION INTRATHECAL; INTRAVASCULAR; INTRAVENOUS; ORAL at 09:13
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
## 2021-06-28 ENCOUNTER — TELEPHONE (OUTPATIENT)
Dept: FAMILY MEDICINE CLINIC | Age: 41
End: 2021-06-28

## 2021-06-28 NOTE — TELEPHONE ENCOUNTER
----- Message from PELON Springer CNP sent at 6/25/2021 11:44 AM EDT -----  No acute process or adenopathy of the abdomen or pelvis noted. Moderate amount of stool noted in colon. 2.4 cm cyst on the right ovary. No free fluid noted in the pelvis.   Recommend possibly some pelvic floor therapy if dyspareunia persist

## 2021-07-05 ENCOUNTER — PATIENT MESSAGE (OUTPATIENT)
Dept: FAMILY MEDICINE CLINIC | Age: 41
End: 2021-07-05

## 2021-07-05 DIAGNOSIS — L98.9 DISORDER OF SKIN AND SUBCUTANEOUS TISSUE: Primary | ICD-10-CM

## 2021-07-06 NOTE — TELEPHONE ENCOUNTER
From: Cyndie العلي  To: Hillary Dominguez, APRN - CNP  Sent: 7/5/2021 10:49 PM EDT  Subject: Non-Urgent Medical Question    Good day. I have broken out in hives due to sun exposure (happens every year tabitha in the summer when I spend more time in the sun). Is there anything that can be prescribed? The itching is unbearable.    May I get a referral to a dermatologist?    Thanks,   [place]

## 2021-07-07 NOTE — TELEPHONE ENCOUNTER
Patient called and notified. States she will try the allegra but would also like the referral to dermatology. Referral made and faxed.

## 2021-07-16 ENCOUNTER — VIRTUAL VISIT (OUTPATIENT)
Dept: PSYCHIATRY | Age: 41
End: 2021-07-16
Payer: COMMERCIAL

## 2021-07-16 DIAGNOSIS — F41.1 GENERALIZED ANXIETY DISORDER: ICD-10-CM

## 2021-07-16 DIAGNOSIS — F14.10 COCAINE ABUSE, EPISODIC USE (HCC): ICD-10-CM

## 2021-07-16 DIAGNOSIS — F33.1 MODERATE EPISODE OF RECURRENT MAJOR DEPRESSIVE DISORDER (HCC): Primary | ICD-10-CM

## 2021-07-16 PROCEDURE — 90833 PSYTX W PT W E/M 30 MIN: CPT | Performed by: NURSE PRACTITIONER

## 2021-07-16 PROCEDURE — 1036F TOBACCO NON-USER: CPT | Performed by: NURSE PRACTITIONER

## 2021-07-16 PROCEDURE — G8427 DOCREV CUR MEDS BY ELIG CLIN: HCPCS | Performed by: NURSE PRACTITIONER

## 2021-07-16 PROCEDURE — G8417 CALC BMI ABV UP PARAM F/U: HCPCS | Performed by: NURSE PRACTITIONER

## 2021-07-16 PROCEDURE — 99214 OFFICE O/P EST MOD 30 MIN: CPT | Performed by: NURSE PRACTITIONER

## 2021-07-16 RX ORDER — LAMOTRIGINE 150 MG/1
150 TABLET ORAL DAILY
Qty: 30 TABLET | Refills: 1 | Status: SHIPPED | OUTPATIENT
Start: 2021-07-16 | End: 2021-09-29 | Stop reason: SDUPTHER

## 2021-07-16 RX ORDER — BUSPIRONE HYDROCHLORIDE 15 MG/1
15 TABLET ORAL 2 TIMES DAILY
Qty: 60 TABLET | Refills: 1 | Status: SHIPPED | OUTPATIENT
Start: 2021-07-16 | End: 2021-09-29 | Stop reason: SDUPTHER

## 2021-07-16 RX ORDER — HYDROXYZINE PAMOATE 25 MG/1
25 CAPSULE ORAL 3 TIMES DAILY PRN
Qty: 30 CAPSULE | Refills: 1 | Status: SHIPPED | OUTPATIENT
Start: 2021-07-16 | End: 2021-09-29 | Stop reason: SDUPTHER
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
## 2021-07-21 DIAGNOSIS — L98.9 DISORDER OF SKIN AND SUBCUTANEOUS TISSUE: Primary | ICD-10-CM

## 2021-07-21 NOTE — PROGRESS NOTES
A dermatology referral was placed to  7/7/21, Dr. Jeanette Coppola does not take [facility].

## 2021-08-16 ENCOUNTER — VIRTUAL VISIT (OUTPATIENT)
Dept: PSYCHIATRY | Age: 41
End: 2021-08-16
Payer: COMMERCIAL

## 2021-08-16 DIAGNOSIS — F41.1 GENERALIZED ANXIETY DISORDER: ICD-10-CM

## 2021-08-16 DIAGNOSIS — F33.1 MODERATE EPISODE OF RECURRENT MAJOR DEPRESSIVE DISORDER (HCC): Primary | ICD-10-CM

## 2021-08-16 PROCEDURE — G8417 CALC BMI ABV UP PARAM F/U: HCPCS | Performed by: NURSE PRACTITIONER

## 2021-08-16 PROCEDURE — 99213 OFFICE O/P EST LOW 20 MIN: CPT | Performed by: NURSE PRACTITIONER

## 2021-08-16 PROCEDURE — G8428 CUR MEDS NOT DOCUMENT: HCPCS | Performed by: NURSE PRACTITIONER

## 2021-08-16 PROCEDURE — 1036F TOBACCO NON-USER: CPT | Performed by: NURSE PRACTITIONER

## 2021-08-16 PROCEDURE — 90833 PSYTX W PT W E/M 30 MIN: CPT | Performed by: NURSE PRACTITIONER
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
## 2021-08-31 LAB
MAGNESIUM: 2.1 MG/DL
POTASSIUM (K+): 3.5

## 2021-09-08 ENCOUNTER — TELEPHONE (OUTPATIENT)
Dept: FAMILY MEDICINE CLINIC | Age: 41
End: 2021-09-08

## 2021-09-08 NOTE — TELEPHONE ENCOUNTER
----- Message from Davey Crew sent at 9/8/2021  1:17 PM EDT -----  Subject: Message to Provider    QUESTIONS  Information for Provider? PT got lab work FROM Rheumatologist 2 weeks ago   labs came back abnormal. Her Rheumatologist can not get her in for appt   until 6/22. Wants to know if you would be willing to discuss those results   with her. States they are on my chart  ---------------------------------------------------------------------------  --------------  CALL BACK INFO  What is the best way for the office to contact you? OK to leave message on   voicemail  Preferred Call Back Phone Number? [phone]  ---------------------------------------------------------------------------  --------------  SCRIPT ANSWERS  Relationship to Patient? Self  (Is the patient requesting to be seen urgently for their symptoms?)? No  Is this follow up request related to routine Diabetes Management? No  Are you having any new concerns about your existing condition?  Yes

## 2021-09-09 NOTE — TELEPHONE ENCOUNTER
Recommend patient discuss with the rheumatologist.. Specific lab test.  Patient may not even need an appointment for that.

## 2021-09-09 NOTE — TELEPHONE ENCOUNTER
Patient notified, she states rheumatologist will not go over the labs with her until her June appointment. She also said that Dr. Julianna Ayers the Dermatologist is the one who ordered labs so I informed her to contact his office to see if they can give her results since their the ordering provider. She will inform us if she has any issues.

## 2021-09-29 ENCOUNTER — VIRTUAL VISIT (OUTPATIENT)
Dept: PSYCHIATRY | Age: 41
End: 2021-09-29
Payer: COMMERCIAL

## 2021-09-29 DIAGNOSIS — F41.1 GENERALIZED ANXIETY DISORDER: ICD-10-CM

## 2021-09-29 DIAGNOSIS — F33.1 MODERATE EPISODE OF RECURRENT MAJOR DEPRESSIVE DISORDER (HCC): Primary | ICD-10-CM

## 2021-09-29 PROCEDURE — G8428 CUR MEDS NOT DOCUMENT: HCPCS | Performed by: NURSE PRACTITIONER

## 2021-09-29 PROCEDURE — 99214 OFFICE O/P EST MOD 30 MIN: CPT | Performed by: NURSE PRACTITIONER

## 2021-09-29 PROCEDURE — 90833 PSYTX W PT W E/M 30 MIN: CPT | Performed by: NURSE PRACTITIONER

## 2021-09-29 PROCEDURE — 1036F TOBACCO NON-USER: CPT | Performed by: NURSE PRACTITIONER

## 2021-09-29 PROCEDURE — G8417 CALC BMI ABV UP PARAM F/U: HCPCS | Performed by: NURSE PRACTITIONER

## 2021-09-29 RX ORDER — BUSPIRONE HYDROCHLORIDE 15 MG/1
15 TABLET ORAL 2 TIMES DAILY
Qty: 60 TABLET | Refills: 1 | Status: SHIPPED | OUTPATIENT
Start: 2021-09-29 | End: 2021-11-22

## 2021-09-29 RX ORDER — HYDROXYZINE PAMOATE 25 MG/1
25 CAPSULE ORAL 3 TIMES DAILY PRN
Qty: 30 CAPSULE | Refills: 1 | Status: SHIPPED | OUTPATIENT
Start: 2021-09-29

## 2021-09-29 RX ORDER — LAMOTRIGINE 150 MG/1
150 TABLET ORAL DAILY
Qty: 30 TABLET | Refills: 1 | Status: SHIPPED | OUTPATIENT
Start: 2021-09-29 | End: 2022-03-11

## 2021-09-29 RX ORDER — LEVOMEFOLATE/ALGAL OIL 15-90.314
1 CAPSULE ORAL DAILY
Qty: 90 CAPSULE | Refills: 3 | Status: SHIPPED | OUTPATIENT
Start: 2021-09-29 | End: 2021-10-05 | Stop reason: SDUPTHER

## 2021-10-04 ENCOUNTER — TELEPHONE (OUTPATIENT)
Dept: PSYCHIATRY | Age: 41
End: 2021-10-04

## 2021-10-04 NOTE — TELEPHONE ENCOUNTER
Carey Alexander wrote into the office via [employer]:    Morning!     The pharmacy called re: food medicine prescribed. The cost is a bit much for my budget. Can you let me know what an alternative option is for the deflin?     Thanks,   Kristin    --Please advise; records indicate that the Rx was sent to [facility].

## 2021-10-05 RX ORDER — LEVOMEFOLATE/ALGAL OIL 15-90.314
1 CAPSULE ORAL DAILY
Qty: 90 CAPSULE | Refills: 3 | Status: SHIPPED | OUTPATIENT
Start: 2021-10-05

## 2021-10-06 ENCOUNTER — OFFICE VISIT (OUTPATIENT)
Dept: NEPHROLOGY | Age: 41
End: 2021-10-06
Payer: COMMERCIAL

## 2021-10-06 VITALS
SYSTOLIC BLOOD PRESSURE: 104 MMHG | OXYGEN SATURATION: 100 % | WEIGHT: 128 LBS | TEMPERATURE: 97 F | BODY MASS INDEX: 24.19 KG/M2 | DIASTOLIC BLOOD PRESSURE: 64 MMHG | HEART RATE: 63 BPM

## 2021-10-06 DIAGNOSIS — E87.6 HYPOKALEMIA: Primary | ICD-10-CM

## 2021-10-06 LAB
MAGNESIUM: 2.3 MG/DL
POTASSIUM (K+): 3.3

## 2021-10-06 PROCEDURE — 99213 OFFICE O/P EST LOW 20 MIN: CPT | Performed by: INTERNAL MEDICINE

## 2021-10-06 PROCEDURE — G8420 CALC BMI NORM PARAMETERS: HCPCS | Performed by: INTERNAL MEDICINE

## 2021-10-06 PROCEDURE — G8484 FLU IMMUNIZE NO ADMIN: HCPCS | Performed by: INTERNAL MEDICINE

## 2021-10-06 PROCEDURE — G8427 DOCREV CUR MEDS BY ELIG CLIN: HCPCS | Performed by: INTERNAL MEDICINE

## 2021-10-06 PROCEDURE — 1036F TOBACCO NON-USER: CPT | Performed by: INTERNAL MEDICINE

## 2021-10-06 RX ORDER — POTASSIUM CHLORIDE 20MEQ/15ML
20 LIQUID (ML) ORAL DAILY
Qty: 1350 ML | Refills: 1 | Status: SHIPPED | OUTPATIENT
Start: 2021-10-06 | End: 2022-04-13 | Stop reason: ALTCHOICE

## 2021-10-06 RX ORDER — AMILORIDE HYDROCHLORIDE 5 MG/1
5 TABLET ORAL 2 TIMES DAILY
Qty: 180 TABLET | Refills: 1 | Status: SHIPPED | OUTPATIENT
Start: 2021-10-06 | End: 2022-04-13 | Stop reason: SDUPTHER

## 2021-10-06 NOTE — PROGRESS NOTES
[facility]  [address]  [facility] [place] [address]  Dept: [phone]  Loc: [phone]  Progress Note  10/6/2021 8:54 AM      Pt Name:    Aisha Mcpherson:    1980  Primary Care Physician:  Gerhardt Sawyer, APRN - CNP     Chief Complaint:   Chief Complaint   Patient presents with    Follow-up     HYPOKEMIA         History of Present Illness: This is a follow-up visit for hypokalemia. Patient states she is feeling well. Taking amiloride twice a day. Tolerating well. Does have occasional dizziness. Has not taken any potassium in several months. It makes her nauseated. She has hx of hypokalemia since gastric sleeve surgery in July. 24 hour urine for potassium was high at 50 meq. Pertinent items are noted in HPI. Past History:  Past Medical History:   Diagnosis Date    Anemia     Gastroesophageal reflux disease 10/19/2020    Headache     Hypothyroidism     Low back pain 10/19/2020    Mixed anxiety depressive disorder 10/19/2020    Mixed hyperlipidemia 10/19/2020    Obesity      Past Surgical History:   Procedure Laterality Date     SECTION      COLON SURGERY      HYSTERECTOMY      SLEEVE GASTRECTOMY          VITALS:  /64 (Site: Right Upper Arm, Position: Sitting, Cuff Size: Small Adult)   Pulse 63   Temp 97 °F (36.1 °C) (Temporal)   Wt 128 lb (58.1 kg)   SpO2 100%   BMI 24.19 kg/m²   Wt Readings from Last 3 Encounters:   10/06/21 128 lb (58.1 kg)   21 136 lb (61.7 kg)   21 137 lb 9.6 oz (62.4 kg)     Body mass index is 24.19 kg/m².      General Appearance: alert and cooperative with exam, appears comfortable, no distress  HEENT: EOMI, moist oral mucus membranes  Neck: No jugular venous distention,   Lungs: Air entry B/L, no crackles or rales, no use of accessory muscles  Heart: S1, S2 heard, no rub  GI: soft, non-tender, no guarding, Component Value Date    WBC 5.02 02/23/2021    HGB 13.1 02/23/2021    HCT 39.1 02/23/2021    MCV 97.1 01/28/2021     02/23/2021     BMP:    Lab Results   Component Value Date     06/11/2021     02/23/2021     01/28/2021    K 3.5 08/31/2021    K 3.7 06/11/2021    K 3.2 04/06/2021     06/11/2021     02/23/2021     01/28/2021    CO2 24 06/11/2021    CO2 28 02/23/2021    CO2 28 01/28/2021    BUN 21 06/11/2021    BUN 18 02/23/2021    BUN 18 01/28/2021    CREATININE 0.9 06/11/2021    CREATININE 0.8 02/23/2021    CREATININE 0.5 01/28/2021    GLUCOSE 91 06/11/2021    GLUCOSE 110 02/23/2021    GLUCOSE 93 01/28/2021      Hepatic:   Lab Results   Component Value Date    AST 19 02/18/2019    AST 26 07/13/2018    AST 20 09/01/2017    ALT 13 02/18/2019    ALT 24 07/13/2018    ALT 12 09/01/2017    BILITOT 0.3 02/18/2019    BILITOT 0.4 07/13/2018    BILITOT 0.4 09/01/2017    ALKPHOS 27 (L) 02/18/2019    ALKPHOS 20 (L) 07/13/2018    ALKPHOS 24 (L) 09/01/2017     BNP: No results found for: BNP  Lipids:   Lab Results   Component Value Date    CHOL 201 (H) 02/18/2019    HDL 50 02/18/2019     INR:   Lab Results   Component Value Date    INR 1.05 07/13/2018     URINE:   Lab Results   Component Value Date    NAUR 78 10/25/2020    NAUR 78 10/25/2020    PROTUR Negative 07/13/2018     Lab Results   Component Value Date    NITRU Negative 07/13/2018    COLORU Yellow 07/13/2018    WBCUA 0-5 07/13/2018    RBCUA 3-5 07/13/2018    MUCUS Present 07/13/2018    LEUKOCYTESUR Negative 07/13/2018    UROBILINOGEN <2.0 E.U./dL 07/13/2018    BILIRUBINUR Negative 07/13/2018    GLUCOSEU Negative 07/13/2018    KETUA Negative 07/13/2018      Microalbumen/Creatinine ratio:  No components found for: RUCREAT        Impression/Plan:   1. Hypokalemia: has some renal potassium wasting. better on amiloride 5 mg BID. Unable to take extended release due to gastric sleeve surgery.   K is 3.3 we will try liquid potassium 20 meq

## 2021-10-08 RX ORDER — LEVOTHYROXINE SODIUM 0.12 MG/1
TABLET ORAL
Qty: 90 TABLET | Refills: 1 | Status: SHIPPED | OUTPATIENT
Start: 2021-10-08

## 2021-10-20 ENCOUNTER — TELEPHONE (OUTPATIENT)
Dept: PSYCHIATRY | Age: 41
End: 2021-10-20

## 2021-10-20 NOTE — TELEPHONE ENCOUNTER
Spoke with the pharmacy help desk regarding the prior approval for Kristin's Vraylar medication; the pharmacy was not able to see the previous approval on file, it was resubmitted to them with a paid claim. I reached back out to the pharmacy to inform them of this information as well; it was approved. I wrote back to Kristin to inform her of this information; please advise on Deplin?

## 2021-10-20 NOTE — TELEPHONE ENCOUNTER
Sky Bower wrote into the office via [employer]:    Good day.      I'm following up again as my pharmacy states they do not have a script for this.      In addition  I was supposed to start Jerene Schooling and the pharmacy states they are awaiting the Dr approval.  I  already received the preauthorization from my insurance company that it has been approved. I'm trying to figure out what the hold up is as I am in serious need of some medication. Can you assist with this?     Thanks,   Kristin    I reached out to the pharmacy to verify the information; spoke with Umm Da Silva; she said that she is unsure what is going on with Kristin's Vraylar medication because it is coming up for a PA again on 09/29/21; records indicate that our office received the approval to start on 08/13/21 until 09/2022? She also said that they are not able to get the Deplin medication in; please advise. I will initiate the PA for her Jerene Schooling again. She is scheduled to return on 11/01/21.

## 2021-10-26 NOTE — TELEPHONE ENCOUNTER
Noted on the Vraylar information. Please check with pharmacy if they have something equivalent or similar to Deplin.

## 2021-10-26 NOTE — TELEPHONE ENCOUNTER
Patient was previously informed about her Vraylar medication and I reached out to the pharmacy and spoke with Sebastián Cam, she said unfortantely there is not a covered alterative or something similar to Deplin that they carry. Please advise anything further .

## 2021-11-01 ENCOUNTER — VIRTUAL VISIT (OUTPATIENT)
Dept: PSYCHIATRY | Age: 41
End: 2021-11-01
Payer: COMMERCIAL

## 2021-11-01 DIAGNOSIS — F41.1 GENERALIZED ANXIETY DISORDER: ICD-10-CM

## 2021-11-01 DIAGNOSIS — F34.0 CYCLOTHYMIA: Primary | ICD-10-CM

## 2021-11-01 PROCEDURE — G8420 CALC BMI NORM PARAMETERS: HCPCS | Performed by: NURSE PRACTITIONER

## 2021-11-01 PROCEDURE — G8427 DOCREV CUR MEDS BY ELIG CLIN: HCPCS | Performed by: NURSE PRACTITIONER

## 2021-11-01 PROCEDURE — 99214 OFFICE O/P EST MOD 30 MIN: CPT | Performed by: NURSE PRACTITIONER

## 2021-11-01 PROCEDURE — 90833 PSYTX W PT W E/M 30 MIN: CPT | Performed by: NURSE PRACTITIONER

## 2021-11-01 PROCEDURE — 1036F TOBACCO NON-USER: CPT | Performed by: NURSE PRACTITIONER

## 2021-11-01 PROCEDURE — G8484 FLU IMMUNIZE NO ADMIN: HCPCS | Performed by: NURSE PRACTITIONER

## 2021-11-01 NOTE — PROGRESS NOTES
[address] [place] [address] [address] [facility] [address]  Dept: [phone]  Dept Fax: [phone]: [phone]    Visit Date: 11/1/2021    TELEPSYCHIATRY VISIT -- Audio/Visual (During OHOJT-27 public health emergency)     Iraida Hinton is a 39 y.o. female being evaluated by a Virtual Visit (video visit) encounter to address concerns as mentioned  below. A caregiver was present when appropriate. Pursuant to the emergency declaration under the [address] [place] [address], [address] [place] [address] and the [employer] Act, this Virtual Visit was conducted with patient's (and/or legal guardian's) consent, to reduce the patient's risk of exposure to COVID-19 and provide necessary medical care. The patient (and/or legal guardian) has also been advised to contact this office for worsening conditions or problems, and seek emergency medical treatment and/or call 911 if deemed necessary. Services were provided through a video synchronous discussion virtually to substitute for in-person clinic visit. Patient was her office and provider was at her individual home. SUBJECTIVE DATA     CHIEF COMPLAINT:    Chief Complaint   Patient presents with   [address] Problem    Follow-up       History obtained from: patient    HISTORY OF PRESENT ILLNESS:    Iraida Hinton is a 39 y.o. female who presents via virtual video visit for follow-up on complaints of depression and anxiety. States \"I'm okay\"    Just started the Parikh Bounds last week  -states she has taken it for only 5 days because \"it makes me so sick and nauseated\".  States she is wanting to continue on the medication at this time because she has similar side effects with everything she takes; she has skipped some days due to the side effects  -has not yet noticed any change in her mood  -states she is having increased energy and decreased need for sleep for the past 2 days    Stressors at home continue  -Son overdosed on a Friday; he then crashed his car a few days later; he then failed a class at his trade school     She has been offered a new position at her current place of employment  -she has been offered the position as director  -this position also comes with a pay raise  -admits work is one of her coping skills    Counseling is going very well  -states she is working through some very difficult stuff  -she attends counseling weekly  -states \"I'm learning to sit in whatever emotion I'm having even though it doesn't feel good. \"  -she is working on what it would look like without a back-up plan     States anxiety is \"super high but I'm functioning\"  -has only been taking BuSpar once daily  -states she immediately goes to [facility] case scenario\" - states \"but I have reason for that\"  -states she always have to have multiple plans \"to function\" because \"nothing has ever worked out for plan A\"; states she always has a back-up plan    Denies suicidal ideations, intent, plan. No homicidal ideations, intent, plan. No audiovisual hallucinations. HPI    The patient has had 1 lifetime suicide attempts. Methods used for the suicide attempts include overdose on medications. The patient's most recent suicide attempt was 2007. Patient reports 1 psych hospital admissions with the last admission taking place 2007. NOTE: Patient states she did attempt suicide as a teenager but does not recall the number of times, methods or number of hospitalizations. The above information reflects SA as an adult.     Past psychiatric medications include: \"too many to list\" Prozac, Zoloft, Celexa, Cymbalta, BuSpar, Wellbutrin, Trintellix, Paxil    Adverse reactions from psychotropic medications:  Nausea      Current Psychiatric Review of Systems         Jazlyn or Hypomania:  no     Panic Attacks:  no     Phobias:  no     Obsessions and Compulsions:  no     Body Living Expenses: Not hard at all   Food Insecurity: No Food Insecurity    Worried About [facility] in the Last Year: Never true    Ran Out of Food in the Last Year: Never true   Transportation Needs: No Transportation Needs    Lack of Transportation (Medical): No    Lack of Transportation (Non-Medical):  No   Physical Activity:     Days of Exercise per Week:     Minutes of Exercise per Session:    Stress:     Feeling of Stress :    Social Connections:     Frequency of Communication with Friends and Family:     Frequency of Social Gatherings with Friends and Family:     Attends Scientologist Services:     Active Member of Clubs or Organizations:     Attends Club or Organization Meetings:     Marital Status:    Intimate Partner Violence:     Fear of Current or Ex-Partner:     Emotionally Abused:     Physically Abused:     Sexually Abused:        FAMILY HISTORY:   Family History   Problem Relation Age of Onset    Anemia Sister     Asthma Sister     Arthritis Maternal Grandmother     Cancer Maternal Grandmother     Hearing Loss Maternal Grandmother     Miscarriages / Stillbirths Maternal Grandmother     Allergy (Severe) Maternal Grandfather     Arthritis Maternal Grandfather     Arrhythmia Maternal Grandfather     Asthma Maternal Grandfather     Coronary Art Dis Maternal Grandfather     Depression Maternal Grandfather     Diabetes Maternal Grandfather     Hearing Loss Maternal Grandfather     High Blood Pressure Maternal Grandfather     Lupus Mother     Drug Abuse Mother        Psychiatric Family History  Son has \"mental health issues\"    PAST MEDICAL HISTORY:    Past Medical History:   Diagnosis Date    Anemia     Gastroesophageal reflux disease 10/19/2020    Headache     Hypothyroidism     Low back pain 10/19/2020    Mixed anxiety depressive disorder 10/19/2020    Mixed hyperlipidemia 10/19/2020    Obesity        PAST SURGICAL HISTORY:    Past Surgical History:   Procedure Laterality Date     SECTION      COLON SURGERY      HYSTERECTOMY      SLEEVE GASTRECTOMY         PREVIOUSMEDICATIONS:  Outpatient Medications Prior to Visit   Medication Sig Dispense Refill    levothyroxine (SYNTHROID) 125 MCG tablet take 1 tablet by mouth once daily 90 tablet 1    potassium chloride 20 MEQ/15ML (10%) oral solution Take 15 mLs by mouth daily 1350 mL 1    aMILoride (MIDAMOR) 5 MG tablet Take 1 tablet by mouth 2 times daily 180 tablet 1    cariprazine hcl (VRAYLAR) 1.5 MG capsule Take 1 capsule by mouth daily 30 capsule 1    lamoTRIgine (LAMICTAL) 150 MG tablet Take 1 tablet by mouth daily 30 tablet 1    busPIRone (BUSPAR) 15 MG tablet Take 15 mg by mouth 2 times daily 60 tablet 1    cetirizine (ZYRTEC ALLERGY) 10 MG tablet Take 1 tablet by mouth daily as needed for Allergies 90 tablet 1    diclofenac sodium (VOLTAREN) 1 % GEL Apply 2 g topically 2 times daily To shoulder 150 g 1    lidocaine (LMX) 4 % cream Apply3 times daily to shoudler topically as needed. 1 Tube 2    famotidine (PEPCID) 20 MG tablet Take 20 mg by mouth daily       ondansetron (ZOFRAN-ODT) 4 MG disintegrating tablet Take 1 tablet by mouth 3 times daily as needed for Nausea or Vomiting 60 tablet 1    L-Methylfolate-Algae (DEPLIN 15) 15-90.314 MG CAPS Take 1 capsule by mouth daily (Patient not taking: Reported on 10/6/2021) 90 capsule 3    hydrOXYzine (VISTARIL) 25 MG capsule Take 1 capsule by mouth 3 times daily as needed for Anxiety (Patient not taking: Reported on 10/6/2021) 30 capsule 1    hydrocortisone 2.5 % ointment Apply topically 2 times daily. 28.35 g 0    tiZANidine (ZANAFLEX) 2 MG tablet Take 1 tablet by mouth every 8 hours as needed (muscle spasm) (Patient not taking: Reported on 10/6/2021) 20 tablet 0     No facility-administered medications prior to visit. ALLERGIES:    Patient has no known allergies.     REVIEW OF SYSTEMS:    Review of Systems    The patient sees PELON Neil - CNP as her primary care provider. SPECIALISTS: nephrology for the hypokalemia; bariatric surgeon (Dr. Alexandria Yin)    OBJECTIVE DATA     There were no vitals taken for this visit. Physical Exam    Mental Status Evaluation:   Orientation: Alert, oriented, thought content appropriate   Mood:. Anxious, Depressed and Irritable      Affect:  Mood Congruent      Appearance:  Age Appropriate, Casually Dressed, Clean, Well Groomed, Clothing Appropriate for Age and Clothing Appropriate for Weather   Activity:  Cooperative, Good Eye Contact and Seated Calmly   Gait/Posture: Normal   Speech:  Clear, Fluent, Normal Pitch and Volume, Age and Situation Appropriate   Thought Process: Within Normal Limits   Thought Content: Within Normal Limits   Cognition:  Grossly Intact   Memory: Intact   Insight:  Good   Judgment: Good   Suicidal Ideations: Denies Suicidal Ideation   Homicidal Ideations: Negative for homicidal ideation   Medication Side Effects: Absent on exam       Attention Span Attention span and concentration were age appropriate       Screenings Completed in This Encounter:     Anxiety and Depression:                    DIAGNOSIS AND ASSESSMENT DATA     DIAGNOSIS:   1. Cyclothymia    2. Generalized anxiety disorder      R/O Personality Disorder    PLAN   Follow-up:  Return in about 4 weeks (around 11/29/2021), or if symptoms worsen or fail to improve, for follow-up and medication management. Prescriptions for this encounter:  New Prescriptions    No medications on file       No orders of the defined types were placed in this encounter. There are no discontinued medications.     Additional orders:  No orders of the defined types were placed in this encounter.    -take BuSpar twice daily as prescribed  -continue great work with therapist in counseling  -continue Raji Merles at this time; if nausea persists will discontinue and trial alternative agent  -supportive therapy provided  -Patient is encouraged to utilize nonpharmacologic coping skills such as deep breathing, guided imagery, guided meditation, muscle relaxation, calming music, and/or journaling. Risks, potential side effects, possibledrug-drug interactions, benefits and alternate treatments discussed in detail. All questions answered. Patient stated understanding and is agreeable to treatment plan. Patient has been instructed to seek emergency help via the emergency and/or calling 911 should symptoms become severe, worsen, or with other concerning symptoms. Patient instructed to goimmediately to the emergency room and/or call 911 with any suicidal or homicidal ideations or if audio/visual hallucinations develop  Patient stated understanding and agrees. Patient given crisis center information. Spent 25 min with patient in counseling regarding topics above. Utilized CBT, MI and psychoeducation to address topics. Patient engaged and responsive throughout session. The remainder of session was spent on symptom evaluation and medication management. Provider Signature:  Electronically signed by PELON Barroso CNP on 11/1/2021 at 9:56 AM    **This report has been created using voice recognition software. It may contain minor errors which are inherent in voice recognition technology. **

## 2021-11-17 LAB
MAGNESIUM: 2.1 MG/DL
POTASSIUM (K+): 3.9

## 2021-11-22 NOTE — TELEPHONE ENCOUNTER
[facility] is requesting refills on behalf of the patient, she is scheduled to follow up 12/10/2021. Prescription pending approval for  Buspar 15 mg, bid, #60, 0 refills.

## 2021-11-23 RX ORDER — BUSPIRONE HYDROCHLORIDE 15 MG/1
TABLET ORAL
Qty: 60 TABLET | Refills: 0 | Status: SHIPPED | OUTPATIENT
Start: 2021-11-23 | End: 2021-12-10 | Stop reason: DRUGHIGH

## 2021-11-24 NOTE — PATIENT INSTRUCTIONS
You need to take potassium at least twice a day  Increase amiloride to twice a day  bloodwork next week alert and awake/follows commands

## 2021-11-30 ENCOUNTER — TELEPHONE (OUTPATIENT)
Dept: PSYCHIATRY | Age: 41
End: 2021-11-30

## 2021-11-30 NOTE — TELEPHONE ENCOUNTER
Patient sent message via [employer]:    \"Morning! I am dealing with some extreme anxiety. I am not sure this can wait until the end of next week when I have an appointment. What are my options? Can you please assist?  Thank you in advance.   Kristin\"

## 2021-12-10 ENCOUNTER — VIRTUAL VISIT (OUTPATIENT)
Dept: PSYCHIATRY | Age: 41
End: 2021-12-10
Payer: COMMERCIAL

## 2021-12-10 DIAGNOSIS — F34.0 CYCLOTHYMIA: Primary | ICD-10-CM

## 2021-12-10 DIAGNOSIS — F41.1 GENERALIZED ANXIETY DISORDER: ICD-10-CM

## 2021-12-10 PROCEDURE — G8427 DOCREV CUR MEDS BY ELIG CLIN: HCPCS | Performed by: NURSE PRACTITIONER

## 2021-12-10 PROCEDURE — G8420 CALC BMI NORM PARAMETERS: HCPCS | Performed by: NURSE PRACTITIONER

## 2021-12-10 PROCEDURE — 90833 PSYTX W PT W E/M 30 MIN: CPT | Performed by: NURSE PRACTITIONER

## 2021-12-10 PROCEDURE — 1036F TOBACCO NON-USER: CPT | Performed by: NURSE PRACTITIONER

## 2021-12-10 PROCEDURE — 99214 OFFICE O/P EST MOD 30 MIN: CPT | Performed by: NURSE PRACTITIONER

## 2021-12-10 PROCEDURE — G8484 FLU IMMUNIZE NO ADMIN: HCPCS | Performed by: NURSE PRACTITIONER

## 2021-12-10 RX ORDER — BUSPIRONE HYDROCHLORIDE 15 MG/1
15 TABLET ORAL 3 TIMES DAILY
Qty: 90 TABLET | Refills: 1 | Status: SHIPPED | OUTPATIENT
Start: 2021-12-10 | End: 2022-01-21 | Stop reason: DRUGHIGH

## 2021-12-10 NOTE — PROGRESS NOTES
marijuana  -states since stopping the marijuana she feels like she cannot function    States since stopping marijuana she has not been able to sleep  -has difficulty initiating and maintaining sleep  -states \"I'm counting worries\"      States she has tried to go to the gym 3 different times this week but has been unable to get herself to go inside. Admits she \"hates\" the holidays  -this is very anxiety provoking  -does not like to be alone    States she would rather be going 1000 miles a minute rather than slow    Has a difficult time being \"still\"      States she had some cravings recently to engage in both drugs and alcohol as well as engage in cutting. Also having some urges to engage in high risk sexual behaviors. Continues with counseling    Denies suicidal ideations, intent, plan. No homicidal ideations, intent, plan. No audiovisual hallucinations. HPI    The patient has had 1 lifetime suicide attempts. Methods used for the suicide attempts include overdose on medications. The patient's most recent suicide attempt was 2007. Patient reports 1 psych hospital admissions with the last admission taking place 2007. NOTE: Patient states she did attempt suicide as a teenager but does not recall the number of times, methods or number of hospitalizations. The above information reflects SA as an adult.     Past psychiatric medications include: \"too many to list\" Prozac, Zoloft, Celexa, Cymbalta, BuSpar, Wellbutrin, Trintellix, Paxil    Adverse reactions from psychotropic medications:  Nausea      Current Psychiatric Review of Systems         Jazlyn or Hypomania:  no     Panic Attacks:  no     Phobias:  no     Obsessions and Compulsions:  no     Body or Vocal Tics:  no     Hallucinations:  no     Delusions:  no    SOCIAL HISTORY:  Patient was born in [place] and raised by her maternal grandparents      Social History     Socioeconomic History    Marital status:      Spouse name: Not on file    Number of children: 7    Years of education: Not on file    Highest education level: Master's degree (e.g., MA, MS, Selena, MEd, MSW, LINDA)   Occupational History    Occupation: advocacy coordinator   Tobacco Use    Smoking status: Never Smoker    Smokeless tobacco: Never Used   Vaping Use    Vaping Use: Never used   Substance and Sexual Activity    Alcohol use: No    Drug use: Yes     Types: Marijuana Berneta Kuldeep)     Comment: medical marijuana    Sexual activity: Yes     Partners: Male   Other Topics Concern    Not on file   Social History Narrative    05/24/2021    LEVEL OF EDUCATION: graduated high school; earned her bachelor degrees in both social work and criminal justice; earned her master's degree social work    SPECIAL EDUCATION NEEDS: None    RESIDENCE: Currently lives with her children; mom    LEGAL HISTORY: None    Lutheran: None    TRAUMA: Yes - does not want to disclose about this at this time    : None    HOBBIES: crafts; spending time with her children    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 MARRIAGES: two. The first marriage was in 2004 and they  after 1.5 years. Second marriage lasted until 2016. They are  after 15 years. CHILDREN: 7     SUBSTANCE USE:    1. Marijuana: medical marijuana - using 2 or 3 times per week. Tried it in high school but didn't start using the medical marijuana until Aug. 2020. Admits she has been using more recently because she has more stressors    2. Cocaine: last use was [place] couple weeks ago\"     Social Determinants of Health     Financial Resource Strain:     Difficulty of Paying Living Expenses: Not on file   Food Insecurity:     Worried About Running Out of Food in the Last Year: Not on file    Luz of Food in the Last Year: Not on file   Transportation Needs:     Lack of Transportation (Medical):  Not on file    Lack of Transportation (Non-Medical):  Not on file   Physical Activity:     Days of Exercise per Week: Not on file    Minutes of Exercise per Session: Not on file   Stress:     Feeling of Stress : Not on file   Social Connections:     Frequency of Communication with Friends and Family: Not on file    Frequency of Social Gatherings with Friends and Family: Not on file    Attends Alevism Services: Not on file    Active Member of [address] [place] [address] or Organizations: Not on file    Attends Club or Organization Meetings: Not on file    Marital Status: Not on file   Intimate Partner Violence:     Fear of Current or Ex-Partner: Not on file    Emotionally Abused: Not on file    Physically Abused: Not on file    Sexually Abused: Not on file   Housing Stability:     Unable to Pay for Housing in the Last Year: Not on file    Number of [place] in the Last Year: Not on file    Unstable Housing in the Last Year: Not on file       FAMILY HISTORY:   Family History   Problem Relation Age of Onset    Anemia Sister     Asthma Sister     Arthritis Maternal Grandmother     Cancer Maternal Grandmother     Hearing Loss Maternal Grandmother     Miscarriages / Stillbirths Maternal Grandmother     Allergy (Severe) Maternal Grandfather     Arthritis Maternal Grandfather     Arrhythmia Maternal Grandfather     Asthma Maternal Grandfather     Coronary Art Dis Maternal Grandfather     Depression Maternal Grandfather     Diabetes Maternal Grandfather     Hearing Loss Maternal Grandfather     High Blood Pressure Maternal Grandfather     Lupus Mother     Drug Abuse Mother        Psychiatric Family History  Son has \"mental health issues\"    PAST MEDICAL HISTORY:    Past Medical History:   Diagnosis Date    Anemia     Gastroesophageal reflux disease 10/19/2020    Headache     Hypothyroidism     Low back pain 10/19/2020    Mixed anxiety depressive disorder 10/19/2020    Mixed hyperlipidemia 10/19/2020    Obesity        PAST SURGICAL HISTORY:    Past Surgical History:   Procedure Laterality Date     SECTION      COLON SURGERY      HYSTERECTOMY      SLEEVE GASTRECTOMY         PREVIOUSMEDICATIONS:  Outpatient Medications Prior to Visit   Medication Sig Dispense Refill    busPIRone (BUSPAR) 15 MG tablet take 1 tablet by mouth twice a day 60 tablet 0    levothyroxine (SYNTHROID) 125 MCG tablet take 1 tablet by mouth once daily 90 tablet 1    potassium chloride 20 MEQ/15ML (10%) oral solution Take 15 mLs by mouth daily 1350 mL 1    aMILoride (MIDAMOR) 5 MG tablet Take 1 tablet by mouth 2 times daily 180 tablet 1    L-Methylfolate-Algae (DEPLIN 15) 15-90.314 MG CAPS Take 1 capsule by mouth daily (Patient not taking: Reported on 10/6/2021) 90 capsule 3    cariprazine hcl (VRAYLAR) 1.5 MG capsule Take 1 capsule by mouth daily 30 capsule 1    lamoTRIgine (LAMICTAL) 150 MG tablet Take 1 tablet by mouth daily 30 tablet 1    hydrOXYzine (VISTARIL) 25 MG capsule Take 1 capsule by mouth 3 times daily as needed for Anxiety (Patient not taking: Reported on 10/6/2021) 30 capsule 1    hydrocortisone 2.5 % ointment Apply topically 2 times daily. 28.35 g 0    cetirizine (ZYRTEC ALLERGY) 10 MG tablet Take 1 tablet by mouth daily as needed for Allergies 90 tablet 1    tiZANidine (ZANAFLEX) 2 MG tablet Take 1 tablet by mouth every 8 hours as needed (muscle spasm) (Patient not taking: Reported on 10/6/2021) 20 tablet 0    diclofenac sodium (VOLTAREN) 1 % GEL Apply 2 g topically 2 times daily To shoulder 150 g 1    lidocaine (LMX) 4 % cream Apply3 times daily to shoudler topically as needed. 1 Tube 2    famotidine (PEPCID) 20 MG tablet Take 20 mg by mouth daily       ondansetron (ZOFRAN-ODT) 4 MG disintegrating tablet Take 1 tablet by mouth 3 times daily as needed for Nausea or Vomiting 60 tablet 1     No facility-administered medications prior to visit. ALLERGIES:    Patient has no known allergies.     REVIEW OF SYSTEMS:    Review of Systems    The patient sees PELON Leblanc CNP as her primary care provider. SPECIALISTS: nephrology for the hypokalemia; bariatric surgeon (Dr. Benji Owen)    OBJECTIVE DATA     There were no vitals taken for this visit. Physical Exam    Mental Status Evaluation:   Orientation: Alert, oriented, thought content appropriate   Mood:. Anxious, Depressed and Irritable      Affect:  Mood Congruent      Appearance:  Age Appropriate, Casually Dressed, Clean, Well Groomed, Clothing Appropriate for Age and Clothing Appropriate for Weather   Activity:  Cooperative, Good Eye Contact and Seated Calmly   Gait/Posture: Normal   Speech:  Clear, Fluent, Normal Pitch and Volume, Age and Situation Appropriate   Thought Process: Within Normal Limits   Thought Content: Within Normal Limits   Cognition:  Grossly Intact   Memory: Intact   Insight:  Good   Judgment: Good   Suicidal Ideations: Denies Suicidal Ideation   Homicidal Ideations: Negative for homicidal ideation   Medication Side Effects: Absent on exam       Attention Span Attention span and concentration were age appropriate       Screenings Completed in This Encounter:     Anxiety and Depression:                    DIAGNOSIS AND ASSESSMENT DATA     DIAGNOSIS:   1. Cyclothymia    2. Generalized anxiety disorder      R/O Personality Disorder    PLAN   Follow-up:  Return in about 4 weeks (around 1/7/2022), or if symptoms worsen or fail to improve, for follow-up and medication management.     Prescriptions for this encounter:  New Prescriptions    No medications on file       Orders Placed This Encounter   Medications    cariprazine hcl (VRAYLAR) 3 MG CAPS capsule     Sig: Take 1 capsule by mouth daily     Dispense:  30 capsule     Refill:  1    busPIRone (BUSPAR) 15 MG tablet     Sig: Take 15 mg by mouth 3 times daily     Dispense:  90 tablet     Refill:  1       Medications Discontinued During This Encounter   Medication Reason    tiZANidine (ZANAFLEX) 2 MG

## 2021-12-13 ENCOUNTER — TELEPHONE (OUTPATIENT)
Dept: PSYCHIATRY | Age: 41
End: 2021-12-13

## 2021-12-13 NOTE — TELEPHONE ENCOUNTER
Patient sent message via [employer]:    \"Afternoon.      It was mentioned to possibly checkout [facility]'s IOP. What is needed to do this? Should ab referral be sent?  Or could you let me know what the process is?     Thanks,  Kristin\"

## 2021-12-14 NOTE — TELEPHONE ENCOUNTER
No referrals are necessary. Patient needs to call [phone] and inform them she is interested in participating in IOP. They will then connect her with a staff member who is dedicated to working on new patient intakes. She will have to do an intake assessment with the intake counselor and then will be directed to IOP or other services as deemed appropriate/necessary. When I called this morning the IOP team member informed me the intake assessments appointments are currently scheduling at least 2.5 weeks in advance. Even if she is unsure or has questions I encourage her to call the number above, get scheduled for the intake assessment and complete the assessment. That will allow her opportunities to ask necessary questions and ensure IOP will be a good fit for her.

## 2022-01-10 ENCOUNTER — TELEPHONE (OUTPATIENT)
Dept: PSYCHIATRY | Age: 42
End: 2022-01-10

## 2022-01-10 NOTE — TELEPHONE ENCOUNTER
Ashanti Clark wrote into the office via [employer]:    Good day!     I have a new job offer and in order to be cleared for my physical, they need a form completed that verifies I don't have any limitations or restrictions. Can you assist w this please? After completed, it can be faxed to [phone].     Thank you in advance. Kristin    --With a blank form attached; form will be placed in this providers mailbox. Please advise; she is not scheduled to return until 01/21/22.

## 2022-01-11 NOTE — TELEPHONE ENCOUNTER
Occupational Health form was completed by this provider and sent to the destination along with attached to the patient via [employer] (preferred communication). Patient confirmed that she received the form as well. [employer] was obtained.

## 2022-01-21 ENCOUNTER — VIRTUAL VISIT (OUTPATIENT)
Dept: PSYCHIATRY | Age: 42
End: 2022-01-21
Payer: COMMERCIAL

## 2022-01-21 DIAGNOSIS — F34.0 CYCLOTHYMIA: Primary | ICD-10-CM

## 2022-01-21 DIAGNOSIS — F41.1 GENERALIZED ANXIETY DISORDER: ICD-10-CM

## 2022-01-21 PROCEDURE — G8484 FLU IMMUNIZE NO ADMIN: HCPCS | Performed by: NURSE PRACTITIONER

## 2022-01-21 PROCEDURE — G8420 CALC BMI NORM PARAMETERS: HCPCS | Performed by: NURSE PRACTITIONER

## 2022-01-21 PROCEDURE — 90833 PSYTX W PT W E/M 30 MIN: CPT | Performed by: NURSE PRACTITIONER

## 2022-01-21 PROCEDURE — 1036F TOBACCO NON-USER: CPT | Performed by: NURSE PRACTITIONER

## 2022-01-21 PROCEDURE — G8427 DOCREV CUR MEDS BY ELIG CLIN: HCPCS | Performed by: NURSE PRACTITIONER

## 2022-01-21 PROCEDURE — 99214 OFFICE O/P EST MOD 30 MIN: CPT | Performed by: NURSE PRACTITIONER

## 2022-01-21 RX ORDER — BUSPIRONE HYDROCHLORIDE 15 MG/1
15 TABLET ORAL 2 TIMES DAILY
Qty: 60 TABLET | Refills: 1 | Status: SHIPPED | OUTPATIENT
Start: 2022-01-21 | End: 2022-03-11 | Stop reason: SDUPTHER

## 2022-01-21 NOTE — PROGRESS NOTES
[address] [place] [address] Suleman Carrizales [facility] [id number]  Dept: [phone]  Dept Fax: [phone]  Loc: [phone]    Visit Date: 1/21/2022    TELEPSYCHIATRY VISIT -- Audio/Visual (During PXKYN-49 public health emergency)    Frederic Rubio is a 39 y.o. female being evaluated by a Virtual Visit (video visit) encounter to address concerns as mentioned below. Patient identification was verified and a caregiver was present when appropriate. Pursuant to the emergency declaration under the [facility], [address] [place] [address] authority and the [employer] and [employer] Act, this Virtual Visit was conducted with patient's (and/or legal guardian's) consent, to reduce the patient's risk of exposure to COVID-19 and provide necessary medical care. The patient (or guardian if applicable) is aware that this is a billable service, which includes applicable co-pays. The patient (and/or legal guardian) has also been advised to contact this office for worsening conditions or problems, and seek emergency medical treatment and/or call 911 if deemed necessary. Services were provided through a synchronous (real-time) audio-video encounter to substitute for in-person clinic visit. The patient was located in a state where the provider was licensed to provide care. Patient and provider were located at their individual homes. SUBJECTIVE DATA     CHIEF COMPLAINT:    Chief Complaint   Patient presents with   [address] Problem    Follow-up       History obtained from: patient    HISTORY OF PRESENT ILLNESS:    Frederic Rubio is a 39 y.o. female who presents via virtual video visit for follow-up on complaints of depression and anxiety.        States she has been doing \"alright\"    She submitted her resignation at her current job and they wouldn't accept it initially.  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 Anxiety is \"not good\"  -states the hydroxyzine \"just puts me to sleep\"  -is only taking BuSpar 15mg twice daily because unable to tolerate 30mg dosing  -states anxiety has been great this week because she has been off work and kids are on remote learning.   -States \"everything has been chill\"  -already feeling pressured leading into Monday    Feeling like she can manage stressors better    Denies suicidal ideations, intent, plan. No homicidal ideations, intent, plan. No audiovisual hallucinations. HPI    The patient has had 1 lifetime suicide attempts. Methods used for the suicide attempts include overdose on medications. The patient's most recent suicide attempt was 2007. Patient reports 1 psych hospital admissions with the last admission taking place 2007. NOTE: Patient states she did attempt suicide as a teenager but does not recall the number of times, methods or number of hospitalizations. The above information reflects SA as an adult.     Past psychiatric medications include: \"too many to list\" Prozac, Zoloft, Celexa, Cymbalta, BuSpar, Wellbutrin, Trintellix, Paxil    Adverse reactions from psychotropic medications:  Nausea      Current Psychiatric Review of Systems         Jazlyn or Hypomania:  no     Panic Attacks:  no     Phobias:  no     Obsessions and Compulsions:  no     Body or Vocal Tics:  no Hallucinations:  no     Delusions:  no    SOCIAL HISTORY:  Patient was born in [place] and raised by her maternal grandparents      Social History     Socioeconomic History    Marital status:      Spouse name: Not on file    Number of children: 9    Years of education: Not on file    Highest education level: Master's degree (e.g., MA, MS, Selena, MEd, MSW, LINDA)   Occupational History    Occupation: advocacy coordinator   Tobacco Use    Smoking status: Never Smoker    Smokeless tobacco: Never Used   Vaping Use    Vaping Use: Never used   Substance and Sexual Activity    Alcohol use: No    Drug use: Yes     Types: Marijuana Eudelia Edelmira)     Comment: medical marijuana    Sexual activity: Yes     Partners: Male   Other Topics Concern    Not on file   Social History Narrative    01/21/2022    LEVEL OF EDUCATION: graduated high school; earned her bachelor degrees in both social work and criminal justice; earned her master's degree social work    SPECIAL EDUCATION NEEDS: None    RESIDENCE: Currently lives with her children; mom    LEGAL HISTORY: None    Synagogue: None    TRAUMA: Yes - does not want to disclose about this at this time    : None    HOBBIES: crafts; spending time with her children    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 She has turned in her resignation for this position but will be staying on part-time for an additional month. 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 MARRIAGES: two. The first marriage was in 2004 and they  after 1.5 years. Second marriage lasted until 2016. They are  after 15 years. CHILDREN: 7     SUBSTANCE USE:    1. Marijuana: medical marijuana - using 2 or 3 times per week.  Tried it in high school but didn't start using the medical marijuana until Aug. 2020. Admits she has been using more recently because she has more stressors    2. Cocaine: last use was [place] couple weeks ago\"     Social Determinants of Health     Financial Resource Strain:     Difficulty of Paying Living Expenses: Not on file   Food Insecurity:     Worried About Running Out of Food in the Last Year: Not on file    Luz of Food in the Last Year: Not on file   Transportation Needs:     Lack of Transportation (Medical): Not on file    Lack of Transportation (Non-Medical):  Not on file   Physical Activity:     Days of Exercise per Week: Not on file    Minutes of Exercise per Session: Not on file   Stress:     Feeling of Stress : Not on file   Social Connections:     Frequency of Communication with Friends and Family: Not on file    Frequency of Social Gatherings with Friends and Family: Not on file    Attends Taoist Services: Not on file    Active Member of [address] [place] [address] or Organizations: Not on file    Attends Club or Organization Meetings: Not on file    Marital Status: Not on file   Intimate Partner Violence:     Fear of Current or Ex-Partner: Not on file    Emotionally Abused: Not on file    Physically Abused: Not on file    Sexually Abused: Not on file   Housing Stability:     Unable to Pay for Housing in the Last Year: Not on file    Number of [place] in the Last Year: Not on file    Unstable Housing in the Last Year: Not on file       FAMILY HISTORY:   Family History   Problem Relation Age of Onset    Anemia Sister     Asthma Sister     Arthritis Maternal Grandmother     Cancer Maternal Grandmother     Hearing Loss Maternal Grandmother     Miscarriages / Stillbirths Maternal Grandmother     Allergy (Severe) Maternal Grandfather     Arthritis Maternal Grandfather     Arrhythmia Maternal Grandfather     Asthma Maternal Grandfather     Coronary Art Dis Maternal Grandfather     Depression Maternal Grandfather     Diabetes Maternal Grandfather  Hearing Loss Maternal Grandfather     High Blood Pressure Maternal Grandfather     Lupus Mother     Drug Abuse Mother        Psychiatric Family History  Son has \"mental health issues\"    PAST MEDICAL HISTORY:    Past Medical History:   Diagnosis Date    Anemia     Gastroesophageal reflux disease 10/19/2020    Headache     Hypothyroidism     Low back pain 10/19/2020    Mixed anxiety depressive disorder 10/19/2020    Mixed hyperlipidemia 10/19/2020    Obesity        PAST SURGICAL HISTORY:    Past Surgical History:   Procedure Laterality Date     SECTION      COLON SURGERY      HYSTERECTOMY      SLEEVE GASTRECTOMY         PREVIOUSMEDICATIONS:  Outpatient Medications Prior to Visit   Medication Sig Dispense Refill    levothyroxine (SYNTHROID) 125 MCG tablet take 1 tablet by mouth once daily 90 tablet 1    L-Methylfolate-Algae (DEPLIN 15) 15-90.314 MG CAPS Take 1 capsule by mouth daily 90 capsule 3    hydrocortisone 2.5 % ointment Apply topically 2 times daily. 28.35 g 0    cetirizine (ZYRTEC ALLERGY) 10 MG tablet Take 1 tablet by mouth daily as needed for Allergies 90 tablet 1    diclofenac sodium (VOLTAREN) 1 % GEL Apply 2 g topically 2 times daily To shoulder 150 g 1    lidocaine (LMX) 4 % cream Apply3 times daily to shoudler topically as needed.  1 Tube 2    famotidine (PEPCID) 20 MG tablet Take 20 mg by mouth daily       ondansetron (ZOFRAN-ODT) 4 MG disintegrating tablet Take 1 tablet by mouth 3 times daily as needed for Nausea or Vomiting 60 tablet 1    cariprazine hcl (VRAYLAR) 3 MG CAPS capsule Take 1 capsule by mouth daily 30 capsule 1    busPIRone (BUSPAR) 15 MG tablet Take 15 mg by mouth 3 times daily (Patient taking differently: Take 15 mg by mouth 2 times daily ) 90 tablet 1    potassium chloride 20 MEQ/15ML (10%) oral solution Take 15 mLs by mouth daily 1350 mL 1    aMILoride (MIDAMOR) 5 MG tablet Take 1 tablet by mouth 2 times daily 180 tablet 1    lamoTRIgine (LAMICTAL) 150 MG tablet Take 1 tablet by mouth daily (Patient not taking: Reported on 12/10/2021) 30 tablet 1    hydrOXYzine (VISTARIL) 25 MG capsule Take 1 capsule by mouth 3 times daily as needed for Anxiety (Patient not taking: Reported on 1/21/2022) 30 capsule 1     No facility-administered medications prior to visit. ALLERGIES:    Patient has no known allergies. REVIEW OF SYSTEMS:    Review of Systems    The patient sees PELON Arndt CNP as her primary care provider. SPECIALISTS: nephrology for the hypokalemia; bariatric surgeon (Dr. Maciej Saunders)    OBJECTIVE DATA     There were no vitals taken for this visit. Physical Exam    Mental Status Evaluation:   Orientation: Alert, oriented, thought content appropriate   Mood:. Anxious      Affect:  Mood Congruent      Appearance:  Age Appropriate, Casually Dressed, Clean, Well Groomed, Clothing Appropriate for Age and Clothing Appropriate for Weather   Activity:  Cooperative, Good Eye Contact and Seated Calmly   Gait/Posture: Normal   Speech:  Clear, Fluent, Normal Pitch and Volume, Age and Situation Appropriate   Thought Process: Within Normal Limits   Thought Content: Within Normal Limits   Cognition:  Grossly Intact   Memory: Intact   Insight:  Good   Judgment: Good   Suicidal Ideations: Denies Suicidal Ideation   Homicidal Ideations: Negative for homicidal ideation   Medication Side Effects: Absent on exam       Attention Span Attention span and concentration were age appropriate       Screenings Completed in This Encounter:     Anxiety and Depression:                    DIAGNOSIS AND ASSESSMENT DATA     DIAGNOSIS:   1. Cyclothymia    2. Generalized anxiety disorder      R/O Personality Disorder    PLAN   Follow-up:  Return in about 6 weeks (around 3/4/2022), or if symptoms worsen or fail to improve, for follow-up and medication management.     Prescriptions for this encounter:  New Prescriptions    No medications on file       Orders Placed This Encounter   Medications    cariprazine hcl (VRAYLAR) 3 MG CAPS capsule     Sig: Take 1 capsule by mouth daily     Dispense:  30 capsule     Refill:  1    busPIRone (BUSPAR) 15 MG tablet     Sig: Take 15 mg by mouth 2 times daily     Dispense:  60 tablet     Refill:  1       Medications Discontinued During This Encounter   Medication Reason    cariprazine hcl (VRAYLAR) 3 MG CAPS capsule REORDER    busPIRone (BUSPAR) 15 MG tablet DOSE ADJUSTMENT       Additional orders:  No orders of the defined types were placed in this encounter.    -take BuSpar twice daily  -continue working with therapist in counseling  -suspect much of reported anxiety is due to stressors at her current job  -continue Vraylar 3mg daily   -supportive therapy provided  -Patient is encouraged to utilize nonpharmacologic coping skills such as deep breathing, guided imagery, guided meditation, muscle relaxation, calming music, and/or journaling. Risks, potential side effects, possibledrug-drug interactions, benefits and alternate treatments discussed in detail. All questions answered. Patient stated understanding and is agreeable to treatment plan. Patient has been instructed to seek emergency help via the emergency and/or calling 911 should symptoms become severe, worsen, or with other concerning symptoms. Patient instructed to goimmediately to the emergency room and/or call 911 with any suicidal or homicidal ideations or if audio/visual hallucinations develop  Patient stated understanding and agrees. Patient given crisis center information. Spent 20 min with patient in counseling regarding topics above. Utilized CBT, MI and psychoeducation to address topics. Patient engaged and responsive throughout session. The remainder of session was spent on symptom evaluation and medication management.     Provider Signature:  Electronically signed by PELON Teran CNP on 1/21/2022 at 9:08 AM    **This report has been created using voice recognition software. It may contain minor errors which are inherent in voice recognition technology. **

## 2022-03-09 RX ORDER — BUSPIRONE HYDROCHLORIDE 15 MG/1
TABLET ORAL
Qty: 90 TABLET | OUTPATIENT
Start: 2022-03-09

## 2022-03-11 ENCOUNTER — TELEMEDICINE (OUTPATIENT)
Dept: PSYCHIATRY | Age: 42
End: 2022-03-11
Payer: COMMERCIAL

## 2022-03-11 DIAGNOSIS — F34.0 CYCLOTHYMIA: Primary | ICD-10-CM

## 2022-03-11 DIAGNOSIS — F41.1 GENERALIZED ANXIETY DISORDER: ICD-10-CM

## 2022-03-11 PROCEDURE — 1036F TOBACCO NON-USER: CPT | Performed by: NURSE PRACTITIONER

## 2022-03-11 PROCEDURE — 99214 OFFICE O/P EST MOD 30 MIN: CPT | Performed by: NURSE PRACTITIONER

## 2022-03-11 PROCEDURE — G8427 DOCREV CUR MEDS BY ELIG CLIN: HCPCS | Performed by: NURSE PRACTITIONER

## 2022-03-11 PROCEDURE — G8420 CALC BMI NORM PARAMETERS: HCPCS | Performed by: NURSE PRACTITIONER

## 2022-03-11 PROCEDURE — G8484 FLU IMMUNIZE NO ADMIN: HCPCS | Performed by: NURSE PRACTITIONER

## 2022-03-11 RX ORDER — BUSPIRONE HYDROCHLORIDE 15 MG/1
15 TABLET ORAL 2 TIMES DAILY
Qty: 60 TABLET | Refills: 1 | Status: SHIPPED | OUTPATIENT
Start: 2022-03-11 | End: 2022-06-10 | Stop reason: SDUPTHER

## 2022-03-11 NOTE — PROGRESS NOTES
[address] [place], [place] [id number]  Dept: [phone]  Dept Fax: [phone]: [phone]    Visit Date: 3/11/2022    TELEPSYCHIATRY VISIT -- Audio/Visual (During GJUOU-88 public health emergency)    Evonne Laws is a 39 y.o. female being evaluated by a Virtual Visit (video visit) encounter to address concerns as mentioned below. Patient identification was verified and a caregiver was present when appropriate. Pursuant to the emergency declaration under the [address] [place] [address], [address] [place] [address] authority and the [place] [employer] Act, this Virtual Visit was conducted with patient's (and/or legal guardian's) consent, to reduce the patient's risk of exposure to COVID-19 and provide necessary medical care. The patient (or guardian if applicable) is aware that this is a billable service, which includes applicable co-pays. The patient (and/or legal guardian) has also been advised to contact this office for worsening conditions or problems, and seek emergency medical treatment and/or call 911 if deemed necessary. Services were provided through a synchronous (real-time) audio-video encounter to substitute for in-person clinic visit. The patient was located in a state where the provider was licensed to provide care. Patient and provider were located at their individual homes. SUBJECTIVE DATA     CHIEF COMPLAINT:    Chief Complaint   Patient presents with   [address] Problem    Follow-up       History obtained from: patient    HISTORY OF PRESENT ILLNESS:    Evnone Laws is a 39 y.o. female who presents via virtual video visit for follow-up on complaints of depression and anxiety.        States she is doing really well  -denies any mood swings  -denies irritability or agitation  -Mood is doing very well   -denies feeling down, sad, depressed  -denies impulsivity  -minimal anxiety    Feels medications are working well. She is phasing out of her old job and has been in her new job for about 6 weeks. -states the phasing out is taking longer than she expected  -enjoying her new job and it is going well    She is working too many hours per week - estimates she is working 70 hours/week  -is ready to reduce work hour    Things at home are \"not the best\"   -states her son OD on marijuana again - he is now 2 weeks sober  -she is concerned about him but states she realizes she can only do so much    Hasn't been to counseling for the last month due to work schedules but plans to resume again in April 2022. Denies suicidal ideations, intent, plan. No homicidal ideations, intent, plan. No audiovisual hallucinations. HPI    The patient has had 1 lifetime suicide attempts. Methods used for the suicide attempts include overdose on medications. The patient's most recent suicide attempt was 2007. Patient reports 1 psych hospital admissions with the last admission taking place 2007. NOTE: Patient states she did attempt suicide as a teenager but does not recall the number of times, methods or number of hospitalizations. The above information reflects SA as an adult.     Past psychiatric medications include: \"too many to list\" Prozac, Zoloft, Celexa, Cymbalta, BuSpar, Wellbutrin, Trintellix, Paxil    Adverse reactions from psychotropic medications:  None at this time      Current Psychiatric Review of Systems         Jazlyn or Hypomania:  no     Panic Attacks:  no     Phobias:  no     Obsessions and Compulsions:  no     Body or Vocal Tics:  no     Hallucinations:  no     Delusions:  no    SOCIAL HISTORY:  Patient was born in [place] and raised by her maternal grandparents      Social History     Socioeconomic History    Marital status:      Spouse name: Not on file    Number of children: 9    Years of education: Not on file    Highest education level: Master's degree (e.g., MA, MS, Selena, MEd, MSW, LINDA)   Occupational History    Occupation: advocacy coordinator   Tobacco Use    Smoking status: Never Smoker    Smokeless tobacco: Never Used   Vaping Use    Vaping Use: Never used   Substance and Sexual Activity    Alcohol use: No    Drug use: Yes     Types: Marijuana Victoriano Prateek)     Comment: medical marijuana    Sexual activity: Yes     Partners: Male   Other Topics Concern    Not on file   Social History Narrative    03/11/2022    LEVEL OF EDUCATION: graduated high school; earned her bachelor degrees in both social work and criminal justice; earned her master's degree social work    SPECIAL EDUCATION NEEDS: None    RESIDENCE: Currently lives with her children; mom    LEGAL HISTORY: None    [employer]: None    TRAUMA: Yes - does not want to disclose about this at this time    : None    HOBBIES: crafts; spending time with her children    EMPLOYMENT: 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 She has been in this position since 2014. She has turned in her resignation for this position but will be staying on part-time until the position is filled. 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 STarted this position Jan. 31, 2021    MARRIAGES: two. The first marriage was in 2004 and they  after 1.5 years. Second marriage lasted until 2016. They are  after 15 years. CHILDREN: 7     SUBSTANCE USE:    1. Marijuana: medical marijuana - using 2 or 3 times per week. Tried it in high school but didn't start using the medical marijuana until Aug. 2020. Admits she has been using more recently because she has more stressors    2.  Cocaine: last use was [place] couple weeks ago\"     Social Determinants of Health     Financial Resource Strain:     Difficulty of Paying Living Expenses: Not on file   Food Insecurity:     Worried About Running Out of Food in the Last Year: Not on file    [place] of Food in the Last Year: Not on file   Transportation Needs:     Lack of Transportation (Medical): Not on file    Lack of Transportation (Non-Medical):  Not on file   Physical Activity:     Days of Exercise per Week: Not on file    Minutes of Exercise per Session: Not on file   Stress:     Feeling of Stress : Not on file   Social Connections:     Frequency of Communication with Friends and Family: Not on file    Frequency of Social Gatherings with Friends and Family: Not on file    Attends Taoism Services: Not on file    Active Member of [address] [place] [address] [employer] or Organizations: Not on file    Attends Club or Organization Meetings: Not on file    Marital Status: Not on file   Intimate Partner Violence:     Fear of Current or Ex-Partner: Not on file    Emotionally Abused: Not on file    Physically Abused: Not on file    Sexually Abused: Not on file   Housing Stability:     Unable to Pay for Housing in the Last Year: Not on file    Number of [place] in the Last Year: Not on file    Unstable Housing in the Last Year: Not on file       FAMILY HISTORY:   Family History   Problem Relation Age of Onset    Anemia Sister     Asthma Sister     Arthritis Maternal Grandmother     Cancer Maternal Grandmother     Hearing Loss Maternal Grandmother     Miscarriages / Stillbirths Maternal Grandmother     Allergy (Severe) Maternal Grandfather     Arthritis Maternal Grandfather     Arrhythmia Maternal Grandfather     Asthma Maternal Grandfather     Coronary Art Dis Maternal Grandfather     Depression Maternal Grandfather     Diabetes Maternal Grandfather     Hearing Loss Maternal Grandfather     High Blood Pressure Maternal Grandfather     Lupus Mother     Drug Abuse Mother        Psychiatric Family History  Son has \"mental health issues\"    PAST MEDICAL HISTORY:    Past Medical History:   Diagnosis Date    Anemia  Gastroesophageal reflux disease 10/19/2020    Headache     Hypothyroidism     Low back pain 10/19/2020    Mixed anxiety depressive disorder 10/19/2020    Mixed hyperlipidemia 10/19/2020    Obesity        PAST SURGICAL HISTORY:    Past Surgical History:   Procedure Laterality Date     SECTION      COLON SURGERY      HYSTERECTOMY      SLEEVE GASTRECTOMY         PREVIOUSMEDICATIONS:  Outpatient Medications Prior to Visit   Medication Sig Dispense Refill    levothyroxine (SYNTHROID) 125 MCG tablet take 1 tablet by mouth once daily 90 tablet 1    L-Methylfolate-Algae (DEPLIN 15) 15-90.314 MG CAPS Take 1 capsule by mouth daily 90 capsule 3    hydrOXYzine (VISTARIL) 25 MG capsule Take 1 capsule by mouth 3 times daily as needed for Anxiety 30 capsule 1    cetirizine (ZYRTEC ALLERGY) 10 MG tablet Take 1 tablet by mouth daily as needed for Allergies 90 tablet 1    diclofenac sodium (VOLTAREN) 1 % GEL Apply 2 g topically 2 times daily To shoulder 150 g 1    lidocaine (LMX) 4 % cream Apply3 times daily to shoudler topically as needed. 1 Tube 2    famotidine (PEPCID) 20 MG tablet Take 20 mg by mouth daily       ondansetron (ZOFRAN-ODT) 4 MG disintegrating tablet Take 1 tablet by mouth 3 times daily as needed for Nausea or Vomiting 60 tablet 1    cariprazine hcl (VRAYLAR) 3 MG CAPS capsule Take 1 capsule by mouth daily 30 capsule 1    busPIRone (BUSPAR) 15 MG tablet Take 15 mg by mouth 2 times daily 60 tablet 1    potassium chloride 20 MEQ/15ML (10%) oral solution Take 15 mLs by mouth daily 1350 mL 1    aMILoride (MIDAMOR) 5 MG tablet Take 1 tablet by mouth 2 times daily 180 tablet 1    lamoTRIgine (LAMICTAL) 150 MG tablet Take 1 tablet by mouth daily (Patient not taking: Reported on 12/10/2021) 30 tablet 1    hydrocortisone 2.5 % ointment Apply topically 2 times daily. 28.35 g 0     No facility-administered medications prior to visit.        ALLERGIES:    Patient has no known allergies. REVIEW OF SYSTEMS:    Review of Systems    The patient sees PELON Whitley CNP as her primary care provider. SPECIALISTS: nephrology for the hypokalemia; bariatric surgeon (Dr. Fitz Leung)    OBJECTIVE DATA     There were no vitals taken for this visit. Physical Exam    Mental Status Evaluation:   Orientation: Alert, oriented, thought content appropriate   Mood:. Euthymic      Affect:  Mood Congruent      Appearance:  Age Appropriate, Casually Dressed, Clean, Well Groomed, Clothing Appropriate for Age and Clothing Appropriate for Weather   Activity:  Cooperative, Good Eye Contact and Seated Calmly   Gait/Posture: Normal   Speech:  Clear, Fluent, Normal Pitch and Volume, Age and Situation Appropriate   Thought Process: Within Normal Limits   Thought Content: Within Normal Limits   Cognition:  Grossly Intact   Memory: Intact   Insight:  Good   Judgment: Good   Suicidal Ideations: Denies Suicidal Ideation   Homicidal Ideations: Negative for homicidal ideation   Medication Side Effects: Absent on exam       Attention Span Attention span and concentration were age appropriate       Screenings Completed in This Encounter:     Anxiety and Depression:                    DIAGNOSIS AND ASSESSMENT DATA     DIAGNOSIS:   1. Cyclothymia    2. Generalized anxiety disorder      R/O Personality Disorder    PLAN   Follow-up:  Return in about 4 weeks (around 4/8/2022), or if symptoms worsen or fail to improve, for follow-up and medication management.     Prescriptions for this encounter:  New Prescriptions    No medications on file       Orders Placed This Encounter   Medications    busPIRone (BUSPAR) 15 MG tablet     Sig: Take 15 mg by mouth 2 times daily     Dispense:  60 tablet     Refill:  1    cariprazine hcl (VRAYLAR) 3 MG CAPS capsule     Sig: Take 1 capsule by mouth daily     Dispense:  30 capsule     Refill:  1       Medications Discontinued During This Encounter   Medication Reason    lamoTRIgine (LAMICTAL) 150 MG tablet Patient Choice    cariprazine hcl (VRAYLAR) 3 MG CAPS capsule REORDER    busPIRone (BUSPAR) 15 MG tablet REORDER       Additional orders:  No orders of the defined types were placed in this encounter.    -continue current medications without changes; patient has been off Lamictal for several months and mood has remained stable - will no restart at this time  -continue working with therapist in counseling  -discussed importance of healthy work-home balance  -supportive therapy provided  -Patient is encouraged to utilize nonpharmacologic coping skills such as deep breathing, guided imagery, guided meditation, muscle relaxation, calming music, and/or journaling. Risks, potential side effects, possibledrug-drug interactions, benefits and alternate treatments discussed in detail. All questions answered. Patient stated understanding and is agreeable to treatment plan. Patient has been instructed to seek emergency help via the emergency and/or calling 911 should symptoms become severe, worsen, or with other concerning symptoms. Patient instructed to goimmediately to the emergency room and/or call 911 with any suicidal or homicidal ideations or if audio/visual hallucinations develop  Patient stated understanding and agrees. Patient given crisis center information. Provider Signature:  Electronically signed by PELON Zuleta CNP on 3/11/2022 at 10:41 AM    **This report has been created using voice recognition software. It may contain minor errors which are inherent in voice recognition technology. **

## 2022-03-25 NOTE — TELEPHONE ENCOUNTER
Patient called and states insurance is not going to cover the Effer K. Do you know what dose Dr. Miriam Kurtz prescribed so I can try to appeal or get the insurance to cover it? [FreeTextEntry1] : 68 year old male with c/o poor flow, incomplete emptying x 2-3 years\par denies hematuria, dysuria\par PSA 1/26/22: 2.54\par \par 2/7/22-IPSS: 22- weak stream (4) noct (0)-now 13\par \par marked improvement without taking flomax-pt still awaiting script due to insurance issue

## 2022-04-11 LAB
BUN BLDV-MCNC: 24 MG/DL
CALCIUM SERPL-MCNC: 8.3 MG/DL
CHLORIDE BLD-SCNC: 105 MMOL/L
CO2: 27 MMOL/L
CREAT SERPL-MCNC: 0.8 MG/DL
GFR CALCULATED: 84
GLUCOSE BLD-MCNC: 92 MG/DL
POTASSIUM SERPL-SCNC: 3.8 MMOL/L
SODIUM BLD-SCNC: 139 MMOL/L

## 2022-04-13 ENCOUNTER — OFFICE VISIT (OUTPATIENT)
Dept: NEPHROLOGY | Age: 42
End: 2022-04-13
Payer: COMMERCIAL

## 2022-04-13 VITALS
TEMPERATURE: 97.2 F | OXYGEN SATURATION: 99 % | WEIGHT: 128 LBS | SYSTOLIC BLOOD PRESSURE: 93 MMHG | DIASTOLIC BLOOD PRESSURE: 65 MMHG | HEART RATE: 68 BPM | BODY MASS INDEX: 24.19 KG/M2

## 2022-04-13 DIAGNOSIS — E87.6 HYPOKALEMIA: Primary | ICD-10-CM

## 2022-04-13 PROCEDURE — 99213 OFFICE O/P EST LOW 20 MIN: CPT | Performed by: INTERNAL MEDICINE

## 2022-04-13 PROCEDURE — 1036F TOBACCO NON-USER: CPT | Performed by: INTERNAL MEDICINE

## 2022-04-13 PROCEDURE — G8427 DOCREV CUR MEDS BY ELIG CLIN: HCPCS | Performed by: INTERNAL MEDICINE

## 2022-04-13 PROCEDURE — G8420 CALC BMI NORM PARAMETERS: HCPCS | Performed by: INTERNAL MEDICINE

## 2022-04-13 RX ORDER — AMILORIDE HYDROCHLORIDE 5 MG/1
5 TABLET ORAL DAILY
Qty: 90 TABLET | Refills: 1 | Status: SHIPPED | OUTPATIENT
Start: 2022-04-13 | End: 2022-10-06 | Stop reason: SDUPTHER

## 2022-04-13 NOTE — PROGRESS NOTES
[address] [facility]  [address]  [facility] [address]  Dept: [phone]  Loc: [phone]  Progress Note  2022 9:33 AM      Pt Name:    Richie Odor:    1980  Primary Care Physician:  PELON Lowery - CNP     Chief Complaint:   Chief Complaint   Patient presents with    Follow-up     hypokalemia         History of Present Illness: This is a follow-up visit for hypokalemia. She is doing well. Taking amiloride once daily. Not on any potassium. Potassium level has been stable. She is eating much better. She has hx of hypokalemia since gastric sleeve surgery. Pertinent items are noted in HPI. Past History:  Past Medical History:   Diagnosis Date    Anemia     Gastroesophageal reflux disease 10/19/2020    Headache     Hypothyroidism     Low back pain 10/19/2020    Mixed anxiety depressive disorder 10/19/2020    Mixed hyperlipidemia 10/19/2020    Obesity      Past Surgical History:   Procedure Laterality Date     SECTION      COLON SURGERY      HYSTERECTOMY      SLEEVE GASTRECTOMY          VITALS:  BP 93/65 (Site: Left Upper Arm, Position: Sitting, Cuff Size: Small Adult)   Pulse 68   Temp 97.2 °F (36.2 °C) (Temporal)   Wt 128 lb (58.1 kg)   SpO2 99%   BMI 24.19 kg/m²   Wt Readings from Last 3 Encounters:   22 128 lb (58.1 kg)   10/06/21 128 lb (58.1 kg)   21 136 lb (61.7 kg)     Body mass index is 24.19 kg/m².      General Appearance: alert and cooperative with exam, appears comfortable, no distress  HEENT: EOMI, moist oral mucus membranes  Neck: No jugular venous distention,   Lungs: Air entry B/L, no crackles or rales, no use of accessory muscles  Heart: S1, S2 heard, no rub  GI: soft, non-tender, no guarding,   Extremities: No sig LE edema, no cyanosis  Skin: warm, dry  Neurologic: no tremor, no asterixis, no focal neurologic deficits Medications:  Current Outpatient Medications   Medication Sig Dispense Refill    busPIRone (BUSPAR) 15 MG tablet Take 15 mg by mouth 2 times daily 60 tablet 1    cariprazine hcl (VRAYLAR) 3 MG CAPS capsule Take 1 capsule by mouth daily 30 capsule 1    levothyroxine (SYNTHROID) 125 MCG tablet take 1 tablet by mouth once daily 90 tablet 1    aMILoride (MIDAMOR) 5 MG tablet Take 1 tablet by mouth 2 times daily 180 tablet 1    hydrOXYzine (VISTARIL) 25 MG capsule Take 1 capsule by mouth 3 times daily as needed for Anxiety 30 capsule 1    hydrocortisone 2.5 % ointment Apply topically 2 times daily. 28.35 g 0    cetirizine (ZYRTEC ALLERGY) 10 MG tablet Take 1 tablet by mouth daily as needed for Allergies 90 tablet 1    lidocaine (LMX) 4 % cream Apply3 times daily to shoudler topically as needed. 1 Tube 2    ondansetron (ZOFRAN-ODT) 4 MG disintegrating tablet Take 1 tablet by mouth 3 times daily as needed for Nausea or Vomiting 60 tablet 1    potassium chloride 20 MEQ/15ML (10%) oral solution Take 15 mLs by mouth daily (Patient not taking: Reported on 4/13/2022) 1350 mL 1    L-Methylfolate-Algae (DEPLIN 15) 15-90.314 MG CAPS Take 1 capsule by mouth daily (Patient not taking: Reported on 4/13/2022) 90 capsule 3    diclofenac sodium (VOLTAREN) 1 % GEL Apply 2 g topically 2 times daily To shoulder (Patient not taking: Reported on 4/13/2022) 150 g 1    famotidine (PEPCID) 20 MG tablet Take 20 mg by mouth daily  (Patient not taking: Reported on 4/13/2022)       No current facility-administered medications for this visit.         Laboratory & Diagnostics:  CBC:   Lab Results   Component Value Date    WBC 5.02 02/23/2021    HGB 13.1 02/23/2021    HCT 39.1 02/23/2021    MCV 97.1 01/28/2021     02/23/2021     BMP:    Lab Results   Component Value Date     04/11/2022     06/11/2021     02/23/2021    K 3.8 04/11/2022    K 3.9 11/17/2021    K 3.3 10/06/2021     04/11/2022

## 2022-04-19 RX ORDER — BUSPIRONE HYDROCHLORIDE 15 MG/1
TABLET ORAL
Qty: 90 TABLET | OUTPATIENT
Start: 2022-04-19

## 2022-04-29 ENCOUNTER — TELEMEDICINE (OUTPATIENT)
Dept: PSYCHIATRY | Age: 42
End: 2022-04-29
Payer: COMMERCIAL

## 2022-04-29 DIAGNOSIS — F34.0 CYCLOTHYMIA: Primary | ICD-10-CM

## 2022-04-29 DIAGNOSIS — F41.1 GENERALIZED ANXIETY DISORDER: ICD-10-CM

## 2022-04-29 PROCEDURE — G8420 CALC BMI NORM PARAMETERS: HCPCS | Performed by: NURSE PRACTITIONER

## 2022-04-29 PROCEDURE — 99214 OFFICE O/P EST MOD 30 MIN: CPT | Performed by: NURSE PRACTITIONER

## 2022-04-29 PROCEDURE — G8427 DOCREV CUR MEDS BY ELIG CLIN: HCPCS | Performed by: NURSE PRACTITIONER

## 2022-04-29 PROCEDURE — 1036F TOBACCO NON-USER: CPT | Performed by: NURSE PRACTITIONER

## 2022-04-29 NOTE — PROGRESS NOTES
[address] [place] [address] Suleman Pereira [phone]  Dept: [phone]  Dept Fax: [phone]: [phone]    Visit Date: 4/29/2022    TELEPSYCHIATRY VISIT -- Audio/Visual (During NKAWV-91 public health emergency)    Candace Baird is a 39 y.o. female being evaluated by a Virtual Visit (video visit) encounter to address concerns as mentioned below. Patient identification was verified and a caregiver was present when appropriate. Pursuant to the emergency declaration under the [facility], [address] [place] [address] authority and the [employer] Act, this Virtual Visit was conducted with patient's (and/or legal guardian's) consent, to reduce the patient's risk of exposure to COVID-19 and provide necessary medical care. The patient (or guardian if applicable) is aware that this is a billable service, which includes applicable co-pays. The patient (and/or legal guardian) has also been advised to contact this office for worsening conditions or problems, and seek emergency medical treatment and/or call 911 if deemed necessary. Services were provided through a synchronous (real-time) audio-video encounter to substitute for in-person clinic visit. The patient was located in a state where the provider was licensed to provide care. SUBJECTIVE DATA     CHIEF COMPLAINT:    Chief Complaint   Patient presents with   [address] Problem    Follow-up       History obtained from: patient    HISTORY OF PRESENT ILLNESS:    Candace Baird is a 39 y.o. female who presents via virtual video visit for follow-up on complaints of depression and anxiety.      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 2022  -she needs to call to make an appointment    Denies suicidal ideations, intent, plan. No homicidal ideations, intent, plan. No audiovisual hallucinations. HPI    The patient has had 1 lifetime suicide attempts. Methods used for the suicide attempts include overdose on medications. The patient's most recent suicide attempt was 2007. Patient reports 1 psych hospital admissions with the last admission taking place 2007. NOTE: Patient states she did attempt suicide as a teenager but does not recall the number of times, methods or number of hospitalizations. The above information reflects SA as an adult. Past psychiatric medications include: \"too many to list\" Prozac, Zoloft, Celexa, Cymbalta, BuSpar, Wellbutrin, Trintellix, Paxil    Adverse reactions from psychotropic medications:  None at this time      Current Psychiatric Review of Systems         Jazlyn or Hypomania:  no     Panic Attacks:  no     Phobias:  no     Obsessions and Compulsions:  no     Body or Vocal Tics:  no     Hallucinations:  no     Delusions:  no    SOCIAL HISTORY:  Patient was born in [place] and raised by her maternal grandparents      Social History     Socioeconomic History    Marital status:      Spouse name: Not on file    Number of children: 9    Years of education: Not on file    Highest education level: Master's degree (e.g., MA, MS, Selena, MEd, MSW, LINDA)   Occupational History    Occupation: advocacy coordinator   Tobacco Use    Smoking status: Never Smoker    Smokeless tobacco: Never Used   Vaping Use    Vaping Use: Never used   Substance and Sexual Activity    Alcohol use: No    Drug use:  Yes Types: Marijuana Sindi Dinning)     Comment: medical marijuana    Sexual activity: Yes     Partners: Male   Other Topics Concern    Not on file   Social History Narrative    03/11/2022    LEVEL OF EDUCATION: graduated high school; earned her bachelor degrees in both social work and criminal justice; earned her master's degree social work    SPECIAL EDUCATION NEEDS: None    RESIDENCE: Currently lives with her children; mom    LEGAL HISTORY: None    Taoism: None    TRAUMA: Yes - does not want to disclose about this at this time    : None    HOBBIES: crafts; spending time with her children    EMPLOYMENT: 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 She has been in this position since 2014. She has turned in her resignation for this position but will be staying on part-time until the position is filled. 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 STarted this position Jan. 31, 2021    MARRIAGES: two. The first marriage was in 2004 and they  after 1.5 years. Second marriage lasted until 2016. They are  after 15 years. CHILDREN: 7     SUBSTANCE USE:    1. Marijuana: medical marijuana - using 2 or 3 times per week. Tried it in high school but didn't start using the medical marijuana until Aug. 2020. Admits she has been using more recently because she has more stressors    2. Cocaine: last use was [place] couple weeks ago\"     Social Determinants of Health     Financial Resource Strain:     Difficulty of Paying Living Expenses: Not on file   Food Insecurity:     Worried About Running Out of Food in the Last Year: Not on file    Luz of Food in the Last Year: Not on file   Transportation Needs:     Lack of Transportation (Medical): Not on file    Lack of Transportation (Non-Medical):  Not on file   Physical Activity:     Days of Exercise per Week: Not on file    Minutes of Exercise per Session: Not on file   Stress:     Feeling of Stress : Not on file   Social Connections:     Frequency of Communication with Friends and Family: Not on file    Frequency of Social Gatherings with Friends and Family: Not on file    Attends Evangelical Services: Not on file    Active Member of [address] [place] [address] or Organizations: Not on file    Attends Club or Organization Meetings: Not on file    Marital Status: Not on file   Intimate Partner Violence:     Fear of Current or Ex-Partner: Not on file    Emotionally Abused: Not on file    Physically Abused: Not on file    Sexually Abused: Not on file   Housing Stability:     Unable to Pay for Housing in the Last Year: Not on file    Number of [place] in the Last Year: Not on file    Unstable Housing in the Last Year: Not on file       FAMILY HISTORY:   Family History   Problem Relation Age of Onset    Anemia Sister     Asthma Sister     Arthritis Maternal Grandmother     Cancer Maternal Grandmother     Hearing Loss Maternal Grandmother     Miscarriages / Stillbirths Maternal Grandmother     Allergy (Severe) Maternal Grandfather     Arthritis Maternal Grandfather     Arrhythmia Maternal Grandfather     Asthma Maternal Grandfather     Coronary Art Dis Maternal Grandfather     Depression Maternal Grandfather     Diabetes Maternal Grandfather     Hearing Loss Maternal Grandfather     High Blood Pressure Maternal Grandfather     Lupus Mother     Drug Abuse Mother        Psychiatric Family History  Son has \"mental health issues\"    PAST MEDICAL HISTORY:    Past Medical History:   Diagnosis Date    Anemia     Gastroesophageal reflux disease 10/19/2020    Headache     Hypothyroidism     Low back pain 10/19/2020    Mixed anxiety depressive disorder 10/19/2020    Mixed hyperlipidemia 10/19/2020    Obesity        PAST SURGICAL HISTORY:    Past Surgical History:   Procedure Laterality Date     SECTION      COLON SURGERY      HYSTERECTOMY      SLEEVE GASTRECTOMY         PREVIOUSMEDICATIONS:  Outpatient Medications Prior to Visit   Medication Sig Dispense Refill    aMILoride (MIDAMOR) 5 MG tablet Take 1 tablet by mouth daily 90 tablet 1    busPIRone (BUSPAR) 15 MG tablet Take 15 mg by mouth 2 times daily 60 tablet 1    cariprazine hcl (VRAYLAR) 3 MG CAPS capsule Take 1 capsule by mouth daily 30 capsule 1    levothyroxine (SYNTHROID) 125 MCG tablet take 1 tablet by mouth once daily 90 tablet 1    L-Methylfolate-Algae (DEPLIN 15) 15-90.314 MG CAPS Take 1 capsule by mouth daily (Patient not taking: Reported on 4/13/2022) 90 capsule 3    hydrOXYzine (VISTARIL) 25 MG capsule Take 1 capsule by mouth 3 times daily as needed for Anxiety 30 capsule 1    hydrocortisone 2.5 % ointment Apply topically 2 times daily. 28.35 g 0    cetirizine (ZYRTEC ALLERGY) 10 MG tablet Take 1 tablet by mouth daily as needed for Allergies 90 tablet 1    diclofenac sodium (VOLTAREN) 1 % GEL Apply 2 g topically 2 times daily To shoulder (Patient not taking: Reported on 4/13/2022) 150 g 1    lidocaine (LMX) 4 % cream Apply3 times daily to shoudler topically as needed. 1 Tube 2    famotidine (PEPCID) 20 MG tablet Take 20 mg by mouth daily  (Patient not taking: Reported on 4/13/2022)      ondansetron (ZOFRAN-ODT) 4 MG disintegrating tablet Take 1 tablet by mouth 3 times daily as needed for Nausea or Vomiting 60 tablet 1     No facility-administered medications prior to visit. ALLERGIES:    Patient has no known allergies. REVIEW OF SYSTEMS:    Review of Systems    The patient sees PELON Figueroa CNP as her primary care provider. SPECIALISTS: nephrology for the hypokalemia; bariatric surgeon (Dr. Dany Spann)    OBJECTIVE DATA     There were no vitals taken for this visit. Physical Exam    Mental Status Evaluation:   Orientation: Alert, oriented, thought content appropriate   Mood:.  Euthymic      Affect: via the emergency and/or calling 911 should symptoms become severe, worsen, or with other concerning symptoms. Patient instructed to goimmediately to the emergency room and/or call 911 with any suicidal or homicidal ideations or if audio/visual hallucinations develop  Patient stated understanding and agrees. Patient given crisis center information. Spent 25 min with patient. Provider Signature:  Electronically signed by PELON Carvajal CNP on 4/29/2022 at 8:06 AM    **This report has been created using voice recognition software. It may contain minor errors which are inherent in voice recognition technology. **

## 2022-05-13 RX ORDER — LEVOTHYROXINE SODIUM 0.12 MG/1
TABLET ORAL
Qty: 90 TABLET | Refills: 1 | OUTPATIENT
Start: 2022-05-13

## 2022-05-27 NOTE — PROGRESS NOTES
[facility]   Date Of Service: 6/2/2022  Provider: Kelli Hernandez DO, DO  Name: César Odonnell   MRN: [id number]    Chief Complaint(s)      Chief Complaint   Patient presents with    New Patient      History of Present illness (HPI)    César Odonnell   is a(n)42 y.o. female with a hx of  has a past medical history of Anemia, Gastroesophageal reflux disease, Headache, Hypothyroidism, Low back pain, Mixed anxiety depressive disorder, Mixed hyperlipidemia, and Obesity. referred by Chloe Goldberg DO for evaluation of  rash and abnormal labs (+SSA)    Sx's started around 12 y.o.a and progressively worsened with significant in 2021. Rash affecting the bilateral forearms, and  since spread to the anterio chest wall face and anterior lower legs. Worse sunlight (photosensitivity) . Biopsy performed by Dr. [facility] . ? Relief with sunlight. + associated itching , elevation off the skin. , reported scaring in some areas on the arms. Recent acne developement over the past 6 months. Joint pains intermittent, localized in the outer hips, left ankle and Right shoulder. reporte intermittent (flares), occurring every 2 months with reported pain, swelling, redness in the left ankles and right shoulder sx's. Started at least 3 years ago. Prior evaluation orthopedics w/ steroid shoulder injectino w/ relief. Prior tx w/ corticosteroid with some relief. Arthralgia typically worse in the evenings. Denies AM stiffness. Aggravating; strenuous activity. Uric acid previously checked and within normal limits. Oral sores along the inner lips and increased in severity over the past 6 months. + dry mouth that improves with water/fluids. + dry eyes.       Denies Photosenstivity, Rash, dry mouth/dry eyes, oral/nasal sores, Raynaud's, digital ulcerations, skin tightening, renal disease,foamy urination, hematuria, sz's, blood clots, pre-term labor loss, AIHA,leukpenia/lymphopenia, thrombocytopenia, hair loss, serositis, enthesitis, dactylitis, nail changes, personal or family history of Psoriatic arthritis, psoriasis, ank spond,     - 6 kids - no health problems    Cancer screening: up to date     Review of Systems    Review of Systems   Constitutional: Positive for fatigue. Negative for fever and unexpected weight change. HENT: Negative. Negative for congestion and mouth sores. Eyes: Negative. Negative for pain and redness. Respiratory: Negative. Negative for cough, shortness of breath and wheezing. Cardiovascular: Negative. Negative for chest pain and leg swelling. Gastrointestinal: Negative. Negative for abdominal pain, constipation, nausea and vomiting. Endocrine: Negative for polyuria. Genitourinary: Negative. Negative for difficulty urinating, frequency and hematuria. Musculoskeletal: Positive for myalgias. Skin: Negative. Negative for color change and rash. Neurological: Positive for weakness (generalized). Negative for dizziness, numbness and headaches. Hematological: Positive for adenopathy (intermittetn cervical). Does not bruise/bleed easily. Psychiatric/Behavioral: Negative. Negative for agitation and sleep disturbance. The patient is not nervous/anxious. PAST MEDICAL HISTORY     has a past medical history of Anemia, Gastroesophageal reflux disease, Headache, Hypothyroidism, Low back pain, Mixed anxiety depressive disorder, Mixed hyperlipidemia, and Obesity. PAST SURGICAL HISTORY     has a past surgical history that includes Hysterectomy; Colon surgery;  section; and Sleeve Gastrectomy.      FAMILY HISTORY      Family History   Problem Relation Age of Onset    Anemia Sister     Asthma Sister     Arthritis Maternal Grandmother     Cancer Maternal Grandmother     Hearing Loss Maternal Grandmother     Miscarriages / Stillbirths Maternal Grandmother     Allergy (Severe) Maternal Grandfather     Arthritis Maternal Grandfather     Arrhythmia Maternal Grandfather     Asthma Maternal Grandfather     Coronary Art Dis Maternal Grandfather     Depression Maternal Grandfather     Diabetes Maternal Grandfather     Hearing Loss Maternal Grandfather     High Blood Pressure Maternal Grandfather     Lupus Mother     Drug Abuse Mother        SOCIAL HISTORY     reports that she has never smoked. She has never used smokeless tobacco. She reports current drug use. Drug: Marijuana Martine Vera). She reports that she does not drink alcohol. ALLERGIES   No Known Allergies    CURRENT MEDICATIONS      Current Outpatient Medications:     aMILoride (MIDAMOR) 5 MG tablet, Take 1 tablet by mouth daily, Disp: 90 tablet, Rfl: 1    busPIRone (BUSPAR) 15 MG tablet, Take 15 mg by mouth 2 times daily, Disp: 60 tablet, Rfl: 1    cariprazine hcl (VRAYLAR) 3 MG CAPS capsule, Take 1 capsule by mouth daily, Disp: 30 capsule, Rfl: 1    levothyroxine (SYNTHROID) 125 MCG tablet, take 1 tablet by mouth once daily, Disp: 90 tablet, Rfl: 1    L-Methylfolate-Algae (DEPLIN 15) 15-90.314 MG CAPS, Take 1 capsule by mouth daily (Patient not taking: Reported on 4/13/2022), Disp: 90 capsule, Rfl: 3    hydrOXYzine (VISTARIL) 25 MG capsule, Take 1 capsule by mouth 3 times daily as needed for Anxiety, Disp: 30 capsule, Rfl: 1    hydrocortisone 2.5 % ointment, Apply topically 2 times daily. , Disp: 28.35 g, Rfl: 0    cetirizine (ZYRTEC ALLERGY) 10 MG tablet, Take 1 tablet by mouth daily as needed for Allergies, Disp: 90 tablet, Rfl: 1    diclofenac sodium (VOLTAREN) 1 % GEL, Apply 2 g topically 2 times daily To shoulder (Patient not taking: Reported on 4/13/2022), Disp: 150 g, Rfl: 1    lidocaine (LMX) 4 % cream, Apply3 times daily to shoudler topically as needed. , Disp: 1 Tube, Rfl: 2    famotidine (PEPCID) 20 MG tablet, Take 20 mg by mouth daily  (Patient not taking: Reported on 4/13/2022), Disp: , Rfl:     ondansetron (ZOFRAN-ODT) 4 MG disintegrating tablet, Take 1 tablet by mouth 3 times daily as needed for Nausea or Vomiting, Disp: 60 tablet, Rfl: 1    PHYSICAL EXAMINATION / OBJECTIVE     Objective: There were no vitals taken for this visit. General Appearance:   alert and oriented to person, place and time well-developed and well nourished  Physch : appropriate affects ,   Head:  Normocephalic and atraumatic  Eyes: No gross abnormalities. , PERRL, Sclera nonicteric, conjunctiva non-INJECTED  ENT:  MMM,  no deformities , + large aphtous ulcers inner lower lips ~ 0.5 cm    Non-tender sinuses. Neck:  Neck supple, Non-tender, No  mass, thyromegaly,    Lymph:  Non-tender, enlarged distal cervical adneopathy  Pulmonary/Chest:  CTA bilat. , normal air movement, no respiratory distress  Cardiovascular:  Normal rate, + S1 and S2,  NO murmurs , rubs, gallups,     * edema  :  Deferred   Abd/GI: Deferred   Neurologic:  gait and coordination normal and speech normal  Skin:  Skin color and temp ,  No rashes or lesions  Extremities:  No clubbing ,     Musculoskeletal:  Upper extremities:    SHOULDERS - tender right  AC, coracoid and deltoid. + speed, crank,  ,   ELBOWS , WRISTS nt,   HANDS/FINGERS    PIPs tender right 3rd, left 2-5. Fullness/boggy right 3rd. Lower extremities:  HIPS tender bilat outer hips. + left FADIR  KNEES nt  ANKLES nt   FEET : nt     Spine:   C-spine, T-spine & L-spine:  Tender lumbosacral spine. Negative SLR , soumya testing. Intact muscle strenght biatl lower ext 5/5 , 2/4 DTR bilat patellar, achellies, and does downgoing bilat. KEY:  Tender :  T  Swelling: S  Non-tender : NT  No swelling: NS           RAPID 3  -- Composite Score MDHAQ (0-10) + Patient pain VAS (0-10): + Patient global assessment VAS (0-10):     5/27/2022 --- RAPID 3 : 0.7 + 4 + 0 =      Remission: <3  Low Disease Activity: <6  Moderate Disease Activity: >=6 and <=12  High Disease Activity: >12    LABS        CBC  Lab Results   Component Value Date    WBC 5.02 02/23/2021    RBC 4.03 02/23/2021 HGB 13.1 02/23/2021    HCT 39.1 02/23/2021    MCV 97.1 01/28/2021    MCH 32.3 01/28/2021    MCHC 33.2 01/28/2021    RDW 13.5 07/13/2018     02/23/2021       CMP  Lab Results   Component Value Date    CALCIUM 8.3 04/11/2022    LABALBU 4.5 02/18/2019    PROT 7.7 02/18/2019     04/11/2022    K 3.8 04/11/2022    CO2 27 04/11/2022     04/11/2022    BUN 24 04/11/2022    CREATININE 0.8 04/11/2022    ALKPHOS 27 02/18/2019    ALT 13 02/18/2019    AST 19 02/18/2019       HgBA1c: No components found for: HGBA1C    Lab Results   Component Value Date    TSH 0.625 02/23/2021     No results found for: VITD25      No results found for: ANASCRN  No results found for: SSA  No results found for: SSB  No results found for: ANTI-SMITH  No results found for: DSDNAAB   No results found for: ANTIRNP  No results found for: C3, C4  No results found for: CCPAB  No results found for: RF    No components found for: CANCASCRN, APANCASCRN  Lab Results   Component Value Date    SEDRATE 93 (H) 01/28/2021     Lab Results   Component Value Date    CRP 2.56 (H) 01/28/2021     Results for Radha Baker" (MRN [id number]) as of 5/27/2022 08:53   Ref. Range 8/10/2021 09:56   ELLEN Antibodies Screen Latest Ref Range: <0.7 Ratio 8.8 (H)   ELLEN Profile Comment Unknown Pend   JAIMEE-1 AB IGG Latest Ref Range: <7 U/mL < 0.3   RNP70 Antibodies, IgG Latest Ref Range: <7 U/mL < 0.3   Scl-70s Antibodies, IgG Latest Ref Range: <7 U/mL < 0.6   Smith SmDP Antibodies, IgG Latest Ref Range: <7 U/mL 1.1   SSA AB IGG Latest Ref Range: <5 U/mL 71.0 (H)   SSB Ab IgG Latest Ref Range: <7 U/mL < 0.3   U1-RNP Antibodies, IgG Latest Ref Range: <5 U/mL 0.5       RADIOLOGY:           ASSESSMENT/PLAN      1. Photosensitivity dermatitis    2. Oral ulceration    3. Polyarthralgia    4. 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 Previously only the distal arms. dermatology notes w/ the diagnosis of Polymorphic light eruption--biopsy consistent with psoriasiform rash with eosinophils - patient found to have the + SSA ab and referred for further evaluation. 2. Oral ulceration - for the past year and w/ increased # and frequency . 2 active oral sores. 3. Polyarthralgia - Right shoulder, Left ankle - intermittent swelling of the joint about every 2 months, prior treatment of NSAIDs (relief but stopped after gastric sleeve), steroid shoulder injections, and corticosteroids. Denies sig. AM stiffness. Examination with mild tender pips, lateral hips, and fullness right 3rd pip. 4. SS-A antibody positive-- SSA 71 (<5)+ sicca sx's of eyes and  Mouth , oral ulcerations and photosensitivity rash. - mother w/ hx of SLE, crohn's, RA. MGF w/ gout. - concern for underlying CTD such as Systemic lupus givne the photosensitivity rash,  and infalmmatory arthritis, oral sores. Would also consider CTD's such as sjogren given the prior strong + SSA. Will evaluate for inflammatory myopath butthere is lower concern given the lack of muscle symptoms    - request records from Dr. Lala Cortez @ [facility]     -     Anti SSB; Future  -     JONATAN Screen with Reflex; Future  -     Miscellaneous Sendout; Future  -     Cardiolipin Antibodies IgG & IgM; Future  -     Lupus Anticoagulant; Future  -     Complement, Total; Future  -     C4 Complement; Future  -     C3 Complement; Future  -     Protein / creatinine ratio, urine; Future  -     Urinalysis; Future  -     CK; Future  -     Aldolase; Future  -     Anti-DNA Antibody, Double-Stranded; Future      5. Chronic low back - worse at the end of the day, no red flag sx's or AM stiffness. Return in about 2 months (around 8/2/2022).     Electronically signed by Gita Bah DO on 5/27/2022 at 8:50 AM    New Prescriptions    No medications on file       5/27/2022       The risks and benefits of my recommendations, as well as other treatment options, benefits and side effects were discussed with the patient today. Questions were answered. Thank you for allowing me to participate in the care of this patient. Please call if there are any questions.

## 2022-06-02 ENCOUNTER — OFFICE VISIT (OUTPATIENT)
Dept: RHEUMATOLOGY | Age: 42
End: 2022-06-02
Payer: COMMERCIAL

## 2022-06-02 VITALS
SYSTOLIC BLOOD PRESSURE: 118 MMHG | WEIGHT: 134.6 LBS | HEIGHT: 61 IN | BODY MASS INDEX: 25.41 KG/M2 | DIASTOLIC BLOOD PRESSURE: 70 MMHG | HEART RATE: 69 BPM | OXYGEN SATURATION: 98 %

## 2022-06-02 DIAGNOSIS — G89.29 CHRONIC MIDLINE LOW BACK PAIN WITHOUT SCIATICA: ICD-10-CM

## 2022-06-02 DIAGNOSIS — M54.50 CHRONIC MIDLINE LOW BACK PAIN WITHOUT SCIATICA: ICD-10-CM

## 2022-06-02 DIAGNOSIS — K12.1 ORAL ULCERATION: ICD-10-CM

## 2022-06-02 DIAGNOSIS — L56.8 PHOTOSENSITIVITY DERMATITIS: Primary | ICD-10-CM

## 2022-06-02 DIAGNOSIS — R76.8 SS-A ANTIBODY POSITIVE: ICD-10-CM

## 2022-06-02 DIAGNOSIS — M25.50 POLYARTHRALGIA: ICD-10-CM

## 2022-06-02 PROCEDURE — 99204 OFFICE O/P NEW MOD 45 MIN: CPT | Performed by: INTERNAL MEDICINE

## 2022-06-02 PROCEDURE — G8427 DOCREV CUR MEDS BY ELIG CLIN: HCPCS | Performed by: INTERNAL MEDICINE

## 2022-06-02 PROCEDURE — G8417 CALC BMI ABV UP PARAM F/U: HCPCS | Performed by: INTERNAL MEDICINE

## 2022-06-02 RX ORDER — PREDNISONE 1 MG/1
TABLET ORAL
Qty: 40 TABLET | Refills: 0 | Status: SHIPPED | OUTPATIENT
Start: 2022-06-02 | End: 2022-06-18

## 2022-06-02 ASSESSMENT — ENCOUNTER SYMPTOMS
EYE REDNESS: 0
VOMITING: 0
SHORTNESS OF BREATH: 0
EYES NEGATIVE: 1
COUGH: 0
CONSTIPATION: 0
WHEEZING: 0
NAUSEA: 0
GASTROINTESTINAL NEGATIVE: 1
RESPIRATORY NEGATIVE: 1
EYE PAIN: 0
ABDOMINAL PAIN: 0
COLOR CHANGE: 0

## 2022-06-10 ENCOUNTER — TELEMEDICINE (OUTPATIENT)
Dept: PSYCHIATRY | Age: 42
End: 2022-06-10
Payer: COMMERCIAL

## 2022-06-10 DIAGNOSIS — F41.1 GENERALIZED ANXIETY DISORDER: ICD-10-CM

## 2022-06-10 DIAGNOSIS — F34.0 CYCLOTHYMIA: Primary | ICD-10-CM

## 2022-06-10 PROCEDURE — G8427 DOCREV CUR MEDS BY ELIG CLIN: HCPCS | Performed by: NURSE PRACTITIONER

## 2022-06-10 PROCEDURE — G8417 CALC BMI ABV UP PARAM F/U: HCPCS | Performed by: NURSE PRACTITIONER

## 2022-06-10 PROCEDURE — 1036F TOBACCO NON-USER: CPT | Performed by: NURSE PRACTITIONER

## 2022-06-10 PROCEDURE — 99214 OFFICE O/P EST MOD 30 MIN: CPT | Performed by: NURSE PRACTITIONER

## 2022-06-10 RX ORDER — BUSPIRONE HYDROCHLORIDE 15 MG/1
15 TABLET ORAL 2 TIMES DAILY
Qty: 60 TABLET | Refills: 3 | Status: SHIPPED | OUTPATIENT
Start: 2022-06-10

## 2022-06-10 NOTE — PROGRESS NOTES
[address] [place] [address] Suleman Pereira [phone]  Dept: [phone]  Dept Fax: [phone]: [phone]    Visit Date: 6/10/2022    TELEPSYCHIATRY VISIT -- Audio/Visual (During [id number]-82 public health emergency)    Katy Cuevas is a 43 y.o. female being evaluated by a Virtual Visit (video visit) encounter to address concerns as mentioned below. Patient identification was verified and a caregiver was present when appropriate. Pursuant to the emergency declaration under the [address] [place] [address], [address] [place] [address] authority and the [employer] Act, this Virtual Visit was conducted with patient's (and/or legal guardian's) consent, to reduce the patient's risk of exposure to COVID-19 and provide necessary medical care. The patient (or guardian if applicable) is aware that this is a billable service, which includes applicable co-pays. The patient (and/or legal guardian) has also been advised to contact this office for worsening conditions or problems, and seek emergency medical treatment and/or call 911 if deemed necessary. Services were provided through a synchronous (real-time) audio-video encounter to substitute for in-person clinic visit. The patient was located in a state where the provider was licensed to provide care. Patient was in her car and provider was at her home. SUBJECTIVE DATA     CHIEF COMPLAINT:    Chief Complaint   Patient presents with   [address] Problem    Follow-up       History obtained from: patient    HISTORY OF PRESENT ILLNESS:    Katy Cuevas is a 43 y.o. female who presents via virtual video visit for follow-up on complaints of depression and anxiety.      States she is doing well  -states staying focused and busy with work  -denies mood swings  -denies impulsivity  -denies feeling down, sad, depressed  -anxiety is stable    Her current job is going well  -enjoys the position  -states she has been able roll out the new program she developed     She has gotten back into counseling, which she is enjoying    Has finally been able to completely transition out of her old job    Son's graduation went well    Things at home are going well overall. Sleep is okay  -some difficulty maintaining sleep    Denies suicidal ideations, intent, plan. No homicidal ideations, intent, plan. No audiovisual hallucinations. HPI    The patient has had 1 lifetime suicide attempts. Methods used for the suicide attempts include overdose on medications. The patient's most recent suicide attempt was 2007. Patient reports 1 psych hospital admissions with the last admission taking place 2007. NOTE: Patient states she did attempt suicide as a teenager but does not recall the number of times, methods or number of hospitalizations. The above information reflects SA as an adult.     Past psychiatric medications include: \"too many to list\" Prozac, Zoloft, Celexa, Cymbalta, BuSpar, Wellbutrin, Trintellix, Paxil    Adverse reactions from psychotropic medications:  None at this time      Current Psychiatric Review of Systems         Jazlyn or Hypomania:  no     Panic Attacks:  no     Phobias:  no     Obsessions and Compulsions:  no     Body or Vocal Tics:  no     Hallucinations:  no     Delusions:  no    SOCIAL HISTORY:  Patient was born in [place] and raised by her maternal grandparents      Social History     Socioeconomic History    Marital status:      Spouse name: Not on file    Number of children: 9    Years of education: Not on file    Highest education level: Master's degree (e.g., MA, MS, Selena, MEd, MSW, LINDA)   Occupational History    Occupation: advocacy coordinator   Tobacco Use    Smoking status: Never Smoker    Smokeless tobacco: Never Used   Vaping Use    Vaping Use: Never used   Substance and Sexual Activity    Alcohol use: No    Drug use: Yes     Types: Marijuana Teddy TafoyasDavid     Comment: medical marijuana    Sexual activity: Yes     Partners: Male   Other Topics Concern    Not on file   Social History Narrative    06/10/2022    LEVEL OF EDUCATION: graduated high school; earned her bachelor degrees in both social work and criminal justice; earned her master's degree social work    SPECIAL EDUCATION NEEDS: None    RESIDENCE: Currently lives with her children; mom    LEGAL HISTORY: None    Protestant: None    TRAUMA: Yes - does not want to disclose about this at this time    : None    HOBBIES: crafts; spending time with her children    EMPLOYMENT: currently employed full-time with the [employer] 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 Started this position Jan. 31, 2021. 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 She had been in this position since 2014. She has turned in her resignation for this position but will be staying on part-time until the position is filled. She started her new position     MARRIAGES: two. The first marriage was in 2004 and they  after 1.5 years. Second marriage lasted until 2016. They are  after 15 years. CHILDREN: 7     SUBSTANCE USE:    1. Marijuana: medical marijuana - using 2 or 3 times per week. Tried it in high school but didn't start using the medical marijuana until Aug. 2020. Admits she has been using more recently because she has more stressors    2. Cocaine: last use was Feb 2022 or March 2022     Social Determinants of Health     Financial Resource Strain:     Difficulty of Paying Living Expenses: Not on file   Food Insecurity:     Worried About Running Out of Food in the Last Year: Not on file    Luz of Food in the Last Year: Not on file   Transportation Needs:     Lack of Transportation (Medical): Not on file    Lack of Transportation (Non-Medical):  Not on file   Physical Activity:     Days of Exercise per Week: Not on file    Minutes of Exercise per Session: Not on file   Stress:     Feeling of Stress : Not on file   Social Connections:     Frequency of Communication with Friends and Family: Not on file    Frequency of Social Gatherings with Friends and Family: Not on file    Attends Temple Services: Not on file    Active Member of [address] [place] [address] or Organizations: Not on file    Attends Club or Organization Meetings: Not on file    Marital Status: Not on file   Intimate Partner Violence:     Fear of Current or Ex-Partner: Not on file    Emotionally Abused: Not on file    Physically Abused: Not on file    Sexually Abused: Not on file   Housing Stability:     Unable to Pay for Housing in the Last Year: Not on file    Number of [place] in the Last Year: Not on file    Unstable Housing in the Last Year: Not on file       FAMILY HISTORY:   Family History   Problem Relation Age of Onset    Anemia Sister     Asthma Sister     Arthritis Maternal Grandmother     Cancer Maternal Grandmother     Hearing Loss Maternal Grandmother     Miscarriages / Stillbirths Maternal Grandmother     Allergy (Severe) Maternal Grandfather     Arthritis Maternal Grandfather     Arrhythmia Maternal Grandfather     Asthma Maternal Grandfather     Coronary Art Dis Maternal Grandfather     Depression Maternal Grandfather     Diabetes Maternal Grandfather     Hearing Loss Maternal Grandfather     High Blood Pressure Maternal Grandfather     Gout Maternal Grandfather     Lupus Mother     Drug Abuse Mother     Rheum Arthritis Mother     Crohn's Disease Mother        Psychiatric Family History  Son has \"mental health issues\"    PAST MEDICAL HISTORY:    Past Medical History:   Diagnosis Date    Anemia     Gastroesophageal reflux disease 10/19/2020    Headache     Hypothyroidism     Low back pain 10/19/2020    Mixed anxiety depressive disorder 10/19/2020    Mixed hyperlipidemia 10/19/2020    Obesity        PAST SURGICAL HISTORY:    Past Surgical History:   Procedure Laterality Date    CARPAL TUNNEL RELEASE Right      SECTION      COLON SURGERY      HYSTERECTOMY (CERVIX STATUS UNKNOWN)      SLEEVE GASTRECTOMY         PREVIOUSMEDICATIONS:  Outpatient Medications Prior to Visit   Medication Sig Dispense Refill    aMILoride (MIDAMOR) 5 MG tablet Take 1 tablet by mouth daily 90 tablet 1    hydrocortisone 2.5 % ointment Apply topically 2 times daily. 28.35 g 0    diclofenac sodium (VOLTAREN) 1 % GEL Apply 2 g topically 2 times daily To shoulder 150 g 1    lidocaine (LMX) 4 % cream Apply3 times daily to shoudler topically as needed. 1 Tube 2    famotidine (PEPCID) 20 MG tablet Take 20 mg by mouth daily       ondansetron (ZOFRAN-ODT) 4 MG disintegrating tablet Take 1 tablet by mouth 3 times daily as needed for Nausea or Vomiting 60 tablet 1    predniSONE (DELTASONE) 5 MG tablet Take 4 tablets by mouth daily for 4 days, THEN 3 tablets daily for 4 days, THEN 2 tablets daily for 4 days, THEN 1 tablet daily for 4 days. (Patient not taking: Reported on 6/10/2022) 40 tablet 0    busPIRone (BUSPAR) 15 MG tablet Take 15 mg by mouth 2 times daily 60 tablet 1    cariprazine hcl (VRAYLAR) 3 MG CAPS capsule Take 1 capsule by mouth daily 30 capsule 1    levothyroxine (SYNTHROID) 125 MCG tablet take 1 tablet by mouth once daily 90 tablet 1    L-Methylfolate-Algae (DEPLIN 15) 15-90.314 MG CAPS Take 1 capsule by mouth daily (Patient not taking: Reported on 2022) 90 capsule 3    hydrOXYzine (VISTARIL) 25 MG capsule Take 1 capsule by mouth 3 times daily as needed for Anxiety (Patient not taking: Reported on 2022) 30 capsule 1    cetirizine (ZYRTEC ALLERGY) 10 MG tablet Take 1 tablet by mouth daily as needed for Allergies (Patient not taking: Reported on 2022) 90 tablet 1     No facility-administered medications prior to visit.        ALLERGIES:    Patient has no known allergies. REVIEW OF SYSTEMS:    Review of Systems    The patient sees PELON Cruz CNP as her primary care provider. SPECIALISTS: nephrology for the hypokalemia; bariatric surgeon (Dr. Darrell Davis)    OBJECTIVE DATA     There were no vitals taken for this visit. Physical Exam    Mental Status Evaluation:   Orientation: Alert, oriented, thought content appropriate   Mood:. Euthymic      Affect:  Mood Congruent      Appearance:  Age Appropriate, Casually Dressed, Clean, Well Groomed, Clothing Appropriate for Age and Clothing Appropriate for Weather   Activity:  Cooperative, Good Eye Contact and Seated Calmly   Gait/Posture: Normal   Speech:  Clear, Fluent, Normal Pitch and Volume, Age and Situation Appropriate   Thought Process: Within Normal Limits   Thought Content: Within Normal Limits   Cognition:  Grossly Intact   Memory: Intact   Insight:  Good   Judgment: Good   Suicidal Ideations: Denies Suicidal Ideation   Homicidal Ideations: Negative for homicidal ideation   Medication Side Effects: Absent on exam       Attention Span Attention span and concentration were age appropriate       Screenings Completed in This Encounter:     Anxiety and Depression:                    DIAGNOSIS AND ASSESSMENT DATA     DIAGNOSIS:   1. Cyclothymia    2. Generalized anxiety disorder      R/O Personality Disorder    PLAN   Follow-up:  Return in about 4 months (around 10/10/2022), or if symptoms worsen or fail to improve, for follow-up and medication management.     Prescriptions for this encounter:  New Prescriptions    No medications on file       Orders Placed This Encounter   Medications    busPIRone (BUSPAR) 15 MG tablet     Sig: Take 15 mg by mouth 2 times daily     Dispense:  60 tablet     Refill:  3    cariprazine hcl (VRAYLAR) 3 MG CAPS capsule     Sig: Take 1 capsule by mouth daily     Dispense:  30 capsule     Refill:  3       Medications Discontinued During This Encounter   Medication Reason    busPIRone (BUSPAR) 15 MG tablet REORDER    cariprazine hcl (VRAYLAR) 3 MG CAPS capsule REORDER       Additional orders:  No orders of the defined types were placed in this encounter.    -continue current medications without changes; patient has been off Lamictal for several months and mood has remained stable  -continue working with therapist in counseling  -discussed importance of healthy work-home balance  -supportive therapy provided  -Patient is encouraged to utilize nonpharmacologic coping skills such as deep breathing, guided imagery, guided meditation, muscle relaxation, calming music, and/or journaling. Risks, potential side effects, possibledrug-drug interactions, benefits and alternate treatments discussed in detail. All questions answered. Patient stated understanding and is agreeable to treatment plan. Patient has been instructed to seek emergency help via the emergency and/or calling 911 should symptoms become severe, worsen, or with other concerning symptoms. Patient instructed to goimmediately to the emergency room and/or call 911 with any suicidal or homicidal ideations or if audio/visual hallucinations develop  Patient stated understanding and agrees. Patient given crisis center information. Provider Signature:  Electronically signed by PELON Whitfield CNP on 6/10/2022 at 8:32 AM    **This report has been created using voice recognition software. It may contain minor errors which are inherent in voice recognition technology. **

## 2022-08-02 LAB
BILIRUBIN, URINE: NEGATIVE
BLOOD, URINE: NEGATIVE
CLARITY: CLEAR
COLOR: YELLOW
GLUCOSE URINE: NEGATIVE
KETONES, URINE: NEGATIVE
LEUKOCYTE ESTERASE, URINE: NEGATIVE
NITRITE, URINE: NEGATIVE
PH UA: 5.5 (ref 4.5–8)
PROTEIN UA: NEGATIVE
SPECIFIC GRAVITY, URINE: 1.03
TOTAL CK: 85 U/L
UROBILINOGEN, URINE: NORMAL

## 2022-08-17 ENCOUNTER — OFFICE VISIT (OUTPATIENT)
Dept: RHEUMATOLOGY | Age: 42
End: 2022-08-17
Payer: COMMERCIAL

## 2022-08-17 VITALS
HEART RATE: 95 BPM | OXYGEN SATURATION: 98 % | DIASTOLIC BLOOD PRESSURE: 68 MMHG | HEIGHT: 61 IN | WEIGHT: 134 LBS | SYSTOLIC BLOOD PRESSURE: 120 MMHG | BODY MASS INDEX: 25.3 KG/M2

## 2022-08-17 DIAGNOSIS — G89.29 CHRONIC MIDLINE LOW BACK PAIN WITHOUT SCIATICA: ICD-10-CM

## 2022-08-17 DIAGNOSIS — M25.511 CHRONIC RIGHT SHOULDER PAIN: ICD-10-CM

## 2022-08-17 DIAGNOSIS — M35.9 UNDIFFERENTIATED CONNECTIVE TISSUE DISEASE (HCC): Primary | ICD-10-CM

## 2022-08-17 DIAGNOSIS — R76.8 SS-A ANTIBODY POSITIVE: ICD-10-CM

## 2022-08-17 DIAGNOSIS — M54.50 CHRONIC MIDLINE LOW BACK PAIN WITHOUT SCIATICA: ICD-10-CM

## 2022-08-17 DIAGNOSIS — K12.1 ORAL ULCERATION: ICD-10-CM

## 2022-08-17 DIAGNOSIS — G89.29 CHRONIC RIGHT SHOULDER PAIN: ICD-10-CM

## 2022-08-17 DIAGNOSIS — Z51.81 MEDICATION MONITORING ENCOUNTER: ICD-10-CM

## 2022-08-17 PROCEDURE — 1036F TOBACCO NON-USER: CPT | Performed by: INTERNAL MEDICINE

## 2022-08-17 PROCEDURE — G8417 CALC BMI ABV UP PARAM F/U: HCPCS | Performed by: INTERNAL MEDICINE

## 2022-08-17 PROCEDURE — 99214 OFFICE O/P EST MOD 30 MIN: CPT | Performed by: INTERNAL MEDICINE

## 2022-08-17 PROCEDURE — G8427 DOCREV CUR MEDS BY ELIG CLIN: HCPCS | Performed by: INTERNAL MEDICINE

## 2022-08-17 RX ORDER — PREDNISONE 10 MG/1
TABLET ORAL
Qty: 15 TABLET | Refills: 0 | Status: SHIPPED | OUTPATIENT
Start: 2022-08-17 | End: 2022-08-27

## 2022-08-17 RX ORDER — HYDROXYCHLOROQUINE SULFATE 200 MG/1
300 TABLET, FILM COATED ORAL DAILY
Qty: 135 TABLET | Refills: 1 | Status: SHIPPED | OUTPATIENT
Start: 2022-08-17 | End: 2022-11-15

## 2022-08-17 ASSESSMENT — ENCOUNTER SYMPTOMS
COLOR CHANGE: 0
NAUSEA: 0
GASTROINTESTINAL NEGATIVE: 1
VOMITING: 0
EYE REDNESS: 0
EYES NEGATIVE: 1
RESPIRATORY NEGATIVE: 1
CONSTIPATION: 0
COUGH: 0
WHEEZING: 0
SHORTNESS OF BREATH: 0
ABDOMINAL PAIN: 0
EYE PAIN: 0

## 2022-08-17 NOTE — PROGRESS NOTES
[facility] RHEUMATOLOGY FOLLOW UP NOTE       Date Of Service: 8/17/2022  Provider: Donald Julian DO ,   PCP: PELON Castañeda - [place]   Name: Euegnio Pearce   MRN: [id number]        History of Present Illness (HPI)     Chief Complaint   Patient presents with    Follow-up     2 month f/u Photosensitivity dermatitis        Eugenio Pearce  is a(n)42 y.o. female with a hx of  has a past medical history of Anemia, Gastroesophageal reflux disease, Headache, Hypothyroidism, Low back pain, Mixed anxiety depressive disorder, Mixed hyperlipidemia, and Obesity. here for the f/u evaluation of Undiff connective tissue disease ( photosensitivity rash , inflammatory arthritis,  (+SSA), oral sores)    --- oral sores - painful - under the tongue, and buccal mucosa. --- arthralig of the left finger, Right shoulder, left hip, left knee, nekc and lower back. Pain 3/10 over the past week. Worse in th emorning. Aggravating: back - standing, walking, sitting, Alleviating: unsure. AM stiffness lasting 15-30 min. --- continued scca sx's, phtoosensitivity rash (decreased with layering of clothing. REVIEW OF SYSTEMS: (ROS)    Review of Systems   Constitutional: Negative. Negative for fatigue, fever and unexpected weight change. HENT: Negative. Negative for congestion and mouth sores. Eyes: Negative. Negative for pain and redness. Respiratory: Negative. Negative for cough, shortness of breath and wheezing. Cardiovascular: Negative. Negative for chest pain and leg swelling. Gastrointestinal: Negative. Negative for abdominal pain, constipation, nausea and vomiting. Endocrine: Negative for polyuria. Genitourinary: Negative. Negative for difficulty urinating, frequency and hematuria. Skin: Negative. Negative for color change and rash. Neurological: Negative. Negative for dizziness, weakness, numbness and headaches. Hematological: Negative. Negative for adenopathy. Does not bruise/bleed easily. Psychiatric/Behavioral: Negative. Negative for agitation and sleep disturbance. The patient is not nervous/anxious. PmHx:  has a past medical history of Anemia, Gastroesophageal reflux disease, Headache, Hypothyroidism, Low back pain, Mixed anxiety depressive disorder, Mixed hyperlipidemia, and Obesity. Social History:  reports that she has never smoked. She has never used smokeless tobacco. She reports current drug use. Drug: Marijuana Harshad Martin). She reports that she does not drink alcohol. No Known Allergies    CURRENT MEDICATIONS      Current Outpatient Medications:     busPIRone (BUSPAR) 15 MG tablet, Take 15 mg by mouth 2 times daily, Disp: 60 tablet, Rfl: 3    cariprazine hcl (VRAYLAR) 3 MG CAPS capsule, Take 1 capsule by mouth daily, Disp: 30 capsule, Rfl: 3    levothyroxine (SYNTHROID) 125 MCG tablet, take 1 tablet by mouth once daily, Disp: 90 tablet, Rfl: 1    hydrocortisone 2.5 % ointment, Apply topically 2 times daily. , Disp: 28.35 g, Rfl: 0    diclofenac sodium (VOLTAREN) 1 % GEL, Apply 2 g topically 2 times daily To shoulder, Disp: 150 g, Rfl: 1    lidocaine (LMX) 4 % cream, Apply3 times daily to shoudler topically as needed. , Disp: 1 Tube, Rfl: 2    famotidine (PEPCID) 20 MG tablet, Take 20 mg by mouth daily , Disp: , Rfl:     ondansetron (ZOFRAN-ODT) 4 MG disintegrating tablet, Take 1 tablet by mouth 3 times daily as needed for Nausea or Vomiting, Disp: 60 tablet, Rfl: 1    aMILoride (MIDAMOR) 5 MG tablet, Take 1 tablet by mouth daily, Disp: 90 tablet, Rfl: 1    L-Methylfolate-Algae (DEPLIN 15) 15-90.314 MG CAPS, Take 1 capsule by mouth daily (Patient not taking: No sig reported), Disp: 90 capsule, Rfl: 3    hydrOXYzine (VISTARIL) 25 MG capsule, Take 1 capsule by mouth 3 times daily as needed for Anxiety (Patient not taking: No sig reported), Disp: 30 capsule, Rfl: 1    cetirizine (ZYRTEC ALLERGY) 10 MG tablet, Take 1 tablet by mouth daily as needed for Allergies (Patient not taking: No sig reported), Disp: 90 tablet, Rfl: 1      PHYSICAL EXAMINATION / OBJECTIVE     Objective:  /68 (Site: Left Upper Arm, Position: Sitting, Cuff Size: Medium Adult)   Pulse 95   Ht 5' 0.98\" (1.549 m)   Wt 134 lb (60.8 kg)   SpO2 98%   BMI 25.33 kg/m²     Physical Exam  Constitutional:       General: She is not in acute distress. Appearance: She is well-developed. She is not diaphoretic. HENT:      Head: Normocephalic and atraumatic. Nose: Nose normal.      Mouth/Throat:      Mouth: Mucous membranes are moist.      Comments: Apthous ulcerations --- lower gingival mucos, under the tonuge  Eyes:      General: No scleral icterus. Conjunctiva/sclera: Conjunctivae normal.   Cardiovascular:      Rate and Rhythm: Normal rate and regular rhythm. Heart sounds: Normal heart sounds. Pulmonary:      Effort: Pulmonary effort is normal. No respiratory distress. Breath sounds: Normal breath sounds. No wheezing or rales. Musculoskeletal:         General: Swelling (pips bilat) and tenderness present. Cervical back: Normal range of motion and neck supple. Comments: PIPs - tender bilateral, fullness pips bilat, hyper extension bilat. Shoulder: tender right , + cameron, speed, hawking, crank testing  Feet = mtps bilat. Lymphadenopathy:      Cervical: No cervical adenopathy. Skin:     General: Skin is warm and dry. Neurological:      Mental Status: She is alert and oriented to person, place, and time. Psychiatric:         Behavior: Behavior normal.           Exam KEY:   Tender :  T    Swelling: S,   Deformities: D,   Non-tender : NT  ,  No swelling: NS         RAPID 3  8/17/2022 --- RAPID 3:  1 + 3 + 1 = 5       Remission: <3  Low Disease Activity: <6  Moderate Disease Activity: >=6 and <=12  High Disease Activity: >12     LABS      Lab Results   Component Value Date    WBC 5.02 02/23/2021    HGB 13.1 02/23/2021    HGB 12.3 01/28/2021    HGB 12.7 10/26/2020    MCV 97.1 01/28/2021 MCHC 33.2 01/28/2021    RDW 13.5 07/13/2018     02/23/2021     01/28/2021     10/26/2020    NEUTROABS 4700 07/13/2018    LYMPHSABS 1.6 01/28/2021    LYMPHSABS 1.5 02/18/2019    LYMPHSABS 1400 07/13/2018    EOSABS 0.1 01/28/2021    BASOSABS 0.0 01/28/2021         Chemistry        Component Value Date/Time     04/11/2022 0000    K 3.8 04/11/2022 0000     04/11/2022 0000    CO2 27 04/11/2022 0000    BUN 24 04/11/2022 0000    CREATININE 0.8 04/11/2022 0000        Component Value Date/Time    CALCIUM 8.3 04/11/2022 0000    ALKPHOS 27 (L) 02/18/2019 0925    AST 19 02/18/2019 0925    ALT 13 02/18/2019 0925    BILITOT 0.3 02/18/2019 0925            Lab Results   Component Value Date    SEDRATE 93 (H) 01/28/2021    CRP 2.56 (H) 01/28/2021       No results found for: VITD25    No results found for: C3, C4]  No results found for: ANASCRN, SSA, SSB, ANTI-SMITH, DSDNAAB, ANTIRNP  No results found for: C3, C4  No results found for: CCPAB, RF      RADIOLOGY / PROCEDURES:       ASSESSMENT/PLAN:     No diagnosis found. UCTD - sjogren vs SLE  Aphthos ulcerations: active  Polyarthritis - active joitn swelling/tendernss. - + SSA ab, oral ulcerations, poarthralgia w/ joint swelling. Photosensitivty, mother w/ SLE.              - mother w/ hx of SLE, crohn's, RA. MGF w/ gout. - start plaquenil 300mg daily 8/17/22 --- Plaquenil side effects include but  not limited to gastrointestinal upset (nausea, vomiting) , headaches, allergic reation, myopathy (muscle inflammation), wt loss, photosensitivity (burning easily in sunlight), retinopathy, and in rare situation psoriasis exacerbations and allergic reactions. - daily sunblock daily recommended. - cont. Daily eye drops. - prednisone taper for the oral sores. Right shoulder pain - ? Related to # 1. Prior MRI and orthopedics evaluation - w/ 2 prior steroid injections   - request orthopedics records.      Medication monitoring - repeat labs q8--12 weeks    - needs baseline plaquenil eye examination. - we spent 40 minutes with the patient discussing the diagnosis ,medication treatment options , plaquenil side effects and the diffierential diagnosis. No follow-ups on file. New Prescriptions    No medications on file       8/17/2022    Electronically signed by Danuta Brambila DO on 8/17/22 at 7:51 AM EDT  Please contact the office if you have any questions or change of symptoms.

## 2022-08-30 ENCOUNTER — TELEPHONE (OUTPATIENT)
Dept: PSYCHIATRY | Age: 42
End: 2022-08-30

## 2022-08-30 ENCOUNTER — PATIENT MESSAGE (OUTPATIENT)
Dept: RHEUMATOLOGY | Age: 42
End: 2022-08-30

## 2022-08-30 NOTE — LETTER
[address] [facility]  [address] [facility]  Phone: [phone]  Fax: [address], APRN - CNP      September 2, 2022     Patient: Tez Mcmanus   YOB: 1980   Date of Visit: 8/30/2022       To Whom It May Concern:    Tez Mcmanus is a patient of [facility] Rheumatology and is being treated for an undifferentiated connective tissue disease. She takes hydroxychloroquine for treatment. We started seeing her in June 2022, and she will need follow up for this condition. If you have any questions or concerns, please don't hesitate to call.     Sincerely,        PELON Peguero CNP

## 2022-08-30 NOTE — TELEPHONE ENCOUNTER
I have wrote back to Kristin with this information via [employer]; awaiting response and call to complete [place].

## 2022-08-30 NOTE — TELEPHONE ENCOUNTER
Mark Anthony Traylor wrote into the office via [employer]:    Good morning. My family has an upcoming immigration hearing. One of the things needed is a letter from my medical providers re: current diagnosis and treatment plan. I have to prove that it would be detrimental for me to continue to receive treatment and medical care here in the [place] and any disruption in family would be an extreme hardship for me. Could you assist with writing this letter? I could provide a sample letter, if needed. I'm willing to pay whatever cost necessary to obtain this letter from my provider. Is this something you can do within the next 5-7 business days? I'll pay ahead, if desired. Please assist me with this. If a release is needed, you can email me one and I can sign and scan back or this can serve as my consent. Please help. Thanks   Kristin    --Please advise. She is scheduled to return on 09/30/22.

## 2022-08-30 NOTE — TELEPHONE ENCOUNTER
Will need release. Who specifically has the immigration hearing? Patient? A family member? Can provide documentation of diagnosis, current treatment recommendations, and timing of follow-up recommendations. She is welcome to provide a sample if she would like; will review and determine if able to provide something similar.

## 2022-08-31 NOTE — TELEPHONE ENCOUNTER
Please clarify with patient regarding this particular hearing. Her records indicate she has reported she is  x2 and living with her children and mother. Records also indicate she was born in [place]. It is unclear why the patient needs a letter for an immigration hearing?

## 2022-08-31 NOTE — TELEPHONE ENCOUNTER
Yashira Hoffman wrote back into the office via [employer]: Thank you SO much. This is for myself and my /partner, Radha Luna. If you send me a release, I can sign it but it would have to be electronically due to me working in [place] and your office in [address] [place] [address]. I re-read your message and I will call your office tomorrow to give the verbal consent. My Chart will not allow me to upload a word or PDF document. Could you provide an email address I could send the sample letter to? Thanks,  Yashira Hoffman    (Sample letters have been attached to another provider within the office due to other family members as well).

## 2022-09-01 NOTE — TELEPHONE ENCOUNTER
León Justice wrote back into the office via [employer]:    Good day! Ms. Yoly Alexandra is correct. I was born in [place] and have been  x2. Yes, anything to do with immigratiuon is confusing. If a phone call is needed, I could call to clarify. In regards to the letter, I misspoke. I was a bit confused on what was being asked so I inquired further. The  explained that I only need a letter with my diagnoses, medication, any treatment I receive, any information about psychiatric care and the importance of continuing the care long term. Due to a pending family immigration application, [employer] are asking for these letters and information of how lack of mental health services/care could potentially affect my children if I were not to receive the needed care and someone else (their father) would need to care for them. i.e. In the earlier days of being under the care of Ms. Yoly Alexandra, I had left the home for a period of time and was living in my car and not caring for my family due to the extreme mental health crisis I was having. I am stabilized now with medication and care from Ms. Keane. I attaching a copy of the letter another provider of mine wrote as an example. Thanks,  Kristin    And attached the following:            Please advise.

## 2022-09-02 LAB
BASOPHILS ABSOLUTE: NORMAL
BASOPHILS RELATIVE PERCENT: NORMAL
BILIRUBIN, URINE: NEGATIVE
BLOOD, URINE: NORMAL
BUN BLDV-MCNC: 17 MG/DL
CALCIUM SERPL-MCNC: 8.9 MG/DL
CHLORIDE BLD-SCNC: 104 MMOL/L
CHOLESTEROL, TOTAL: 192 MG/DL
CHOLESTEROL/HDL RATIO: ABNORMAL
CLARITY: CLEAR
CO2: 29 MMOL/L
COLOR: YELLOW
CREAT SERPL-MCNC: 0.8 MG/DL
EOSINOPHILS ABSOLUTE: NORMAL
EOSINOPHILS RELATIVE PERCENT: NORMAL
GFR CALCULATED: 84
GLUCOSE BLD-MCNC: 89 MG/DL
GLUCOSE URINE: NEGATIVE
HCT VFR BLD CALC: 39.7 % (ref 36–46)
HDLC SERPL-MCNC: 74 MG/DL (ref 35–70)
HEMOGLOBIN: 13 G/DL (ref 12–16)
KETONES, URINE: NEGATIVE
LDL CHOLESTEROL CALCULATED: 106 MG/DL (ref 0–160)
LEUKOCYTE ESTERASE, URINE: NEGATIVE
LYMPHOCYTES ABSOLUTE: NORMAL
LYMPHOCYTES RELATIVE PERCENT: NORMAL
MCH RBC QN AUTO: NORMAL PG
MCHC RBC AUTO-ENTMCNC: NORMAL G/DL
MCV RBC AUTO: NORMAL FL
MONOCYTES ABSOLUTE: NORMAL
MONOCYTES RELATIVE PERCENT: NORMAL
NEUTROPHILS ABSOLUTE: NORMAL
NEUTROPHILS RELATIVE PERCENT: NORMAL
NITRITE, URINE: NEGATIVE
NONHDLC SERPL-MCNC: ABNORMAL MG/DL
PH UA: 5.5 (ref 4.5–8)
PLATELET # BLD: 213 K/ΜL
PMV BLD AUTO: NORMAL FL
POTASSIUM SERPL-SCNC: 3.7 MMOL/L
PROTEIN UA: NEGATIVE
RBC # BLD: 4.05 10^6/ΜL
SODIUM BLD-SCNC: 139 MMOL/L
SPECIFIC GRAVITY, URINE: 1.03
TRIGL SERPL-MCNC: 60 MG/DL
UROBILINOGEN, URINE: NORMAL
VITAMIN B-12: 415
VITAMIN D 25-HYDROXY: 43.3
VITAMIN D2, 25 HYDROXY: NORMAL
VITAMIN D3,25 HYDROXY: NORMAL
VLDLC SERPL CALC-MCNC: 12 MG/DL
WBC # BLD: 4.01 10^3/ML

## 2022-10-05 LAB
BUN BLDV-MCNC: 17 MG/DL
CALCIUM SERPL-MCNC: 8.7 MG/DL
CHLORIDE BLD-SCNC: 103 MMOL/L
CO2: 31 MMOL/L
CREAT SERPL-MCNC: 0.8 MG/DL
GFR CALCULATED: 84
GLUCOSE BLD-MCNC: 79 MG/DL
POTASSIUM SERPL-SCNC: 3.7 MMOL/L
SODIUM BLD-SCNC: 138 MMOL/L

## 2022-10-06 ENCOUNTER — OFFICE VISIT (OUTPATIENT)
Dept: NEPHROLOGY | Age: 42
End: 2022-10-06
Payer: COMMERCIAL

## 2022-10-06 VITALS
HEART RATE: 80 BPM | DIASTOLIC BLOOD PRESSURE: 60 MMHG | WEIGHT: 134 LBS | SYSTOLIC BLOOD PRESSURE: 106 MMHG | BODY MASS INDEX: 25.33 KG/M2 | OXYGEN SATURATION: 98 %

## 2022-10-06 DIAGNOSIS — E87.6 HYPOKALEMIA: Primary | ICD-10-CM

## 2022-10-06 PROCEDURE — G8417 CALC BMI ABV UP PARAM F/U: HCPCS | Performed by: INTERNAL MEDICINE

## 2022-10-06 PROCEDURE — G8427 DOCREV CUR MEDS BY ELIG CLIN: HCPCS | Performed by: INTERNAL MEDICINE

## 2022-10-06 PROCEDURE — 99213 OFFICE O/P EST LOW 20 MIN: CPT | Performed by: INTERNAL MEDICINE

## 2022-10-06 PROCEDURE — G8484 FLU IMMUNIZE NO ADMIN: HCPCS | Performed by: INTERNAL MEDICINE

## 2022-10-06 PROCEDURE — 1036F TOBACCO NON-USER: CPT | Performed by: INTERNAL MEDICINE

## 2022-10-06 RX ORDER — AMILORIDE HYDROCHLORIDE 5 MG/1
5 TABLET ORAL DAILY
Qty: 90 TABLET | Refills: 3 | Status: SHIPPED | OUTPATIENT
Start: 2022-10-06 | End: 2023-01-04

## 2022-10-06 NOTE — PROGRESS NOTES
[address] [place] [address] [address] [id number]  Dept: [phone]  Loc: [phone]  Progress Note  10/6/2022 9:14 AM      Pt Name:    Price Holman  YOB: 1980  Primary Care Physician:  PEOLN Carvajal - CNP     Chief Complaint:   Chief Complaint   Patient presents with    Follow-up     Hypokalemia         History of Present Illness: This is a follow-up visit for hypokalemia that started after her gastric sleeve surgery. Levels have been controlled with Amiloride. Patient has been having headaches for about past month. Was started on Plaquenil for UCTD, following with Dr. Dottie Gonzalez. She started a new job and she loves it. Working with [employer]. Pertinent items are noted in HPI. Past History:  Past Medical History:   Diagnosis Date    Anemia     Gastroesophageal reflux disease 10/19/2020    Headache     Hypothyroidism     Low back pain 10/19/2020    Mixed anxiety depressive disorder 10/19/2020    Mixed hyperlipidemia 10/19/2020    Obesity      Past Surgical History:   Procedure Laterality Date    CARPAL TUNNEL RELEASE Right      SECTION      COLON SURGERY      HYSTERECTOMY (CERVIX STATUS UNKNOWN)      SLEEVE GASTRECTOMY          VITALS:  /60 (Site: Right Upper Arm, Position: Sitting, Cuff Size: Large Adult)   Pulse 80   Wt 134 lb (60.8 kg)   SpO2 98%   BMI 25.33 kg/m²   Wt Readings from Last 3 Encounters:   10/06/22 134 lb (60.8 kg)   22 134 lb (60.8 kg)   22 134 lb 9.6 oz (61.1 kg)     Body mass index is 25.33 kg/m².      General Appearance: alert and cooperative with exam, appears comfortable, no distress  HEENT: EOMI, moist oral mucus membranes  Neck: No jugular venous distention,   Lungs: Air entry B/L, no crackles or rales, no use of accessory muscles  Heart: S1, S2 heard, no rub  GI: soft, non-tender, no guarding,   Extremities: No sig LE edema, no cyanosis  Skin: warm, dry  Neurologic: no tremor, no asterixis, no focal neurologic deficits     Medications:  Current Outpatient Medications   Medication Sig Dispense Refill    hydroxychloroquine (PLAQUENIL) 200 MG tablet Take 1.5 tablets by mouth daily 135 tablet 1    busPIRone (BUSPAR) 15 MG tablet Take 15 mg by mouth 2 times daily 60 tablet 3    cariprazine hcl (VRAYLAR) 3 MG CAPS capsule Take 1 capsule by mouth daily 30 capsule 3    aMILoride (MIDAMOR) 5 MG tablet Take 1 tablet by mouth daily 90 tablet 1    levothyroxine (SYNTHROID) 125 MCG tablet take 1 tablet by mouth once daily 90 tablet 1    hydrocortisone 2.5 % ointment Apply topically 2 times daily. 28.35 g 0    diclofenac sodium (VOLTAREN) 1 % GEL Apply 2 g topically 2 times daily To shoulder 150 g 1    lidocaine (LMX) 4 % cream Apply3 times daily to shoudler topically as needed. 1 Tube 2    famotidine (PEPCID) 20 MG tablet Take 20 mg by mouth daily       ondansetron (ZOFRAN-ODT) 4 MG disintegrating tablet Take 1 tablet by mouth 3 times daily as needed for Nausea or Vomiting 60 tablet 1    L-Methylfolate-Algae (DEPLIN 15) 15-90.314 MG CAPS Take 1 capsule by mouth daily (Patient not taking: No sig reported) 90 capsule 3    hydrOXYzine (VISTARIL) 25 MG capsule Take 1 capsule by mouth 3 times daily as needed for Anxiety (Patient not taking: No sig reported) 30 capsule 1    cetirizine (ZYRTEC ALLERGY) 10 MG tablet Take 1 tablet by mouth daily as needed for Allergies (Patient not taking: No sig reported) 90 tablet 1     No current facility-administered medications for this visit.         Laboratory & Diagnostics:  CBC:   Lab Results   Component Value Date    WBC 4.01 09/02/2022    HGB 13.0 09/02/2022    HCT 39.7 09/02/2022    MCV 97.1 01/28/2021     09/02/2022     BMP:    Lab Results   Component Value Date     09/02/2022     04/11/2022     06/11/2021    K 3.7 09/02/2022    K 3.8 04/11/2022    K 3.9 11/17/2021     09/02/2022     04/11/2022     06/11/2021    CO2 29 09/02/2022    CO2 27 04/11/2022    CO2 24 06/11/2021    BUN 17 09/02/2022    BUN 24 04/11/2022    BUN 21 06/11/2021    CREATININE 0.8 09/02/2022    CREATININE 0.8 04/11/2022    CREATININE 0.9 06/11/2021    GLUCOSE 89 09/02/2022    GLUCOSE 92 04/11/2022    GLUCOSE 91 06/11/2021      Hepatic:   Lab Results   Component Value Date    AST 19 02/18/2019    AST 26 07/13/2018    AST 20 09/01/2017    ALT 13 02/18/2019    ALT 24 07/13/2018    ALT 12 09/01/2017    BILITOT 0.3 02/18/2019    BILITOT 0.4 07/13/2018    BILITOT 0.4 09/01/2017    ALKPHOS 27 (L) 02/18/2019    ALKPHOS 20 (L) 07/13/2018    ALKPHOS 24 (L) 09/01/2017     BNP: No results found for: BNP  Lipids:   Lab Results   Component Value Date    CHOL 192 09/02/2022    HDL 74 (A) 09/02/2022     INR:   Lab Results   Component Value Date    INR 1.05 07/13/2018     URINE:   Lab Results   Component Value Date/Time    NAUR 78 10/25/2020 06:00 AM    NAUR 78 10/25/2020 06:00 AM    PROTUR Negative 07/13/2018 09:05 AM     Lab Results   Component Value Date/Time    NITRU Negative 09/02/2022 12:00 AM    COLORU Yellow 09/02/2022 12:00 AM    PHUR 5.5 09/02/2022 12:00 AM    WBCUA 0-5 07/13/2018 09:05 AM    RBCUA 3-5 07/13/2018 09:05 AM    MUCUS Present 07/13/2018 09:05 AM    CLARITYU Clear 09/02/2022 12:00 AM    LEUKOCYTESUR Negative 09/02/2022 12:00 AM    UROBILINOGEN Normal 09/02/2022 12:00 AM    BILIRUBINUR Negative 09/02/2022 12:00 AM    BLOODU  09/02/2022 12:00 AM      Comment:      small    GLUCOSEU negative 09/02/2022 12:00 AM    KETUA Negative 09/02/2022 12:00 AM      Microalbumen/Creatinine ratio:  No components found for: RUCREAT        Impression/Plan:   1. Hypokalemia:improved. Continue Amiloride 5 mg daily She had some renal potassium wasting. Bloodwork and medications were reviewed and plan of care discussed with the patient. Return to clinic in 1 year or sooner if the need arises.       Justina Melgoza DO [facility]

## 2022-10-27 NOTE — TELEPHONE ENCOUNTER
Chandler Lira is a current patient of Ya Lutz requesting a refill on cariprazine 3mg cap. Chandler Lira voiced that she is doing well, scheduled for a future appt on 5/9/23.  Loaded pending approval.

## 2022-10-31 NOTE — TELEPHONE ENCOUNTER
Rx sent. Please advise patient if symptoms worsen during Kay's MAT leave to call for appt. If she were to develop SI, HI or hallucinations/delusions advise to present to ER for evaluation.

## 2022-11-29 ENCOUNTER — OFFICE VISIT (OUTPATIENT)
Dept: RHEUMATOLOGY | Age: 42
End: 2022-11-29
Payer: COMMERCIAL

## 2022-11-29 VITALS
HEART RATE: 63 BPM | SYSTOLIC BLOOD PRESSURE: 110 MMHG | WEIGHT: 134.6 LBS | DIASTOLIC BLOOD PRESSURE: 68 MMHG | OXYGEN SATURATION: 100 % | HEIGHT: 61 IN | BODY MASS INDEX: 25.41 KG/M2

## 2022-11-29 DIAGNOSIS — M70.62 TROCHANTERIC BURSITIS OF LEFT HIP: ICD-10-CM

## 2022-11-29 DIAGNOSIS — G89.29 CHRONIC RIGHT SHOULDER PAIN: ICD-10-CM

## 2022-11-29 DIAGNOSIS — M25.511 CHRONIC RIGHT SHOULDER PAIN: ICD-10-CM

## 2022-11-29 DIAGNOSIS — M35.9 UNDIFFERENTIATED CONNECTIVE TISSUE DISEASE (HCC): Primary | ICD-10-CM

## 2022-11-29 DIAGNOSIS — M54.50 CHRONIC MIDLINE LOW BACK PAIN WITHOUT SCIATICA: ICD-10-CM

## 2022-11-29 DIAGNOSIS — Z51.81 MEDICATION MONITORING ENCOUNTER: ICD-10-CM

## 2022-11-29 DIAGNOSIS — G89.29 CHRONIC MIDLINE LOW BACK PAIN WITHOUT SCIATICA: ICD-10-CM

## 2022-11-29 PROCEDURE — 99214 OFFICE O/P EST MOD 30 MIN: CPT | Performed by: INTERNAL MEDICINE

## 2022-11-29 PROCEDURE — G8417 CALC BMI ABV UP PARAM F/U: HCPCS | Performed by: INTERNAL MEDICINE

## 2022-11-29 PROCEDURE — 20605 DRAIN/INJ JOINT/BURSA W/O US: CPT | Performed by: INTERNAL MEDICINE

## 2022-11-29 PROCEDURE — G8427 DOCREV CUR MEDS BY ELIG CLIN: HCPCS | Performed by: INTERNAL MEDICINE

## 2022-11-29 PROCEDURE — G8484 FLU IMMUNIZE NO ADMIN: HCPCS | Performed by: INTERNAL MEDICINE

## 2022-11-29 PROCEDURE — 1036F TOBACCO NON-USER: CPT | Performed by: INTERNAL MEDICINE

## 2022-11-29 RX ORDER — METHYLPREDNISOLONE ACETATE 40 MG/ML
40 INJECTION, SUSPENSION INTRA-ARTICULAR; INTRALESIONAL; INTRAMUSCULAR; SOFT TISSUE ONCE
Status: COMPLETED | OUTPATIENT
Start: 2022-11-29 | End: 2022-11-29

## 2022-11-29 RX ADMIN — METHYLPREDNISOLONE ACETATE 40 MG: 40 INJECTION, SUSPENSION INTRA-ARTICULAR; INTRALESIONAL; INTRAMUSCULAR; SOFT TISSUE at 08:47

## 2022-11-29 ASSESSMENT — ENCOUNTER SYMPTOMS
GASTROINTESTINAL NEGATIVE: 1
SHORTNESS OF BREATH: 0
VOMITING: 0
WHEEZING: 0
COUGH: 1
CONSTIPATION: 0
ABDOMINAL PAIN: 0
EYE PAIN: 0
EYES NEGATIVE: 1
COLOR CHANGE: 0
NAUSEA: 0
EYE REDNESS: 0

## 2022-11-29 NOTE — PROGRESS NOTES
[facility] RHEUMATOLOGY FOLLOW UP NOTE       Date Of Service: 11/29/2022  Provider: Pooja Wu DO ,   PCP: PELON Mccain - [place]   Name: Bryanna Shah   MRN: [id number]        History of Present Illness (HPI)     Chief Complaint   Patient presents with    Follow-up     3 mo f/u, Undifferentiated connective tissue disease ([address] Photosensitivity dermatitis    Pt stated pain 2/10         Bryanna Shah  is a(n)42 y.o. female with a hx of  has a past medical history of Anemia, Gastroesophageal reflux disease, Headache, Hypothyroidism, Low back pain, Mixed anxiety depressive disorder, Mixed hyperlipidemia, and Obesity. here for the f/u evaluation of Undiff connective tissue disease ( photosensitivity rash , inflammatory arthritis,  (+SSA), oral sores)      --- taking plaquenil and tolerating   --  continued  oral sores - with decreased frequency since start of Plaquenil. painful - under the tongue, and buccal mucosa -- last outbreak 10 day's   --  arthralgia of the left hip and right shoulder, intermittent 2/10 over the past week . Aggravating: shoulder increased use. Hip- walking, direct pressures, getting up from seated positiong.   --- continued sicca sx's (eyes and mouth)   --- no recurrence of the  photosensitivity rash   --  intermittent dry cough over the past 6 weeks           REVIEW OF SYSTEMS: (ROS)    Review of Systems   Constitutional:  Positive for fatigue. Negative for fever and unexpected weight change. HENT:  Positive for mouth sores. Negative for congestion. Eyes: Negative. Negative for pain and redness. Respiratory:  Positive for cough. Negative for shortness of breath and wheezing. Cardiovascular: Negative. Negative for chest pain and leg swelling. Gastrointestinal: Negative. Negative for abdominal pain, constipation, nausea and vomiting. Endocrine: Negative for polyuria. Genitourinary: Negative. Negative for difficulty urinating, frequency and hematuria.    Skin: Negative. Negative for color change and rash. Neurological: Negative. Negative for dizziness, weakness, numbness and headaches. Hematological: Negative. Negative for adenopathy. Does not bruise/bleed easily. Psychiatric/Behavioral: Negative. Negative for agitation and sleep disturbance. The patient is not nervous/anxious. PmHx:  has a past medical history of Anemia, Gastroesophageal reflux disease, Headache, Hypothyroidism, Low back pain, Mixed anxiety depressive disorder, Mixed hyperlipidemia, and Obesity. Social History:  reports that she has never smoked. She has never used smokeless tobacco. She reports current drug use. Drug: Marijuana [employer]). She reports that she does not drink alcohol. No Known Allergies    CURRENT MEDICATIONS      Current Outpatient Medications:     cariprazine hcl (VRAYLAR) 3 MG CAPS capsule, Take 1 capsule by mouth daily, Disp: 30 capsule, Rfl: 3    aMILoride (MIDAMOR) 5 MG tablet, Take 1 tablet by mouth daily, Disp: 90 tablet, Rfl: 3    busPIRone (BUSPAR) 15 MG tablet, Take 15 mg by mouth 2 times daily, Disp: 60 tablet, Rfl: 3    levothyroxine (SYNTHROID) 125 MCG tablet, take 1 tablet by mouth once daily, Disp: 90 tablet, Rfl: 1    L-Methylfolate-Algae (DEPLIN 15) 15-90.314 MG CAPS, Take 1 capsule by mouth daily, Disp: 90 capsule, Rfl: 3    hydrOXYzine (VISTARIL) 25 MG capsule, Take 1 capsule by mouth 3 times daily as needed for Anxiety, Disp: 30 capsule, Rfl: 1    hydrocortisone 2.5 % ointment, Apply topically 2 times daily. , Disp: 28.35 g, Rfl: 0    cetirizine (ZYRTEC ALLERGY) 10 MG tablet, Take 1 tablet by mouth daily as needed for Allergies, Disp: 90 tablet, Rfl: 1    lidocaine (LMX) 4 % cream, [facility] times [facility] as needed. , Disp: 1 Tube, Rfl: 2    famotidine (PEPCID) 20 MG tablet, Take 20 mg by mouth daily , Disp: , Rfl:     ondansetron (ZOFRAN-ODT) 4 MG disintegrating tablet, Take 1 tablet by mouth 3 times daily as needed for Nausea or Vomiting, Disp: 60 tablet, Rfl: 1    diclofenac sodium (VOLTAREN) 1 % GEL, Apply 2 g topically 2 times daily To shoulder, Disp: 150 g, Rfl: 1      PHYSICAL EXAMINATION / OBJECTIVE     Objective:  /68 (Site: Left Upper Arm, Position: Sitting, Cuff Size: Large Adult)   Pulse 63   Ht 5' 0.98\" (1.549 m)   Wt 134 lb 9.6 oz (61.1 kg)   SpO2 100%   BMI 25.45 kg/m²     Physical Exam  Constitutional:       General: She is not in acute distress. Appearance: She is well-developed. She is not diaphoretic. HENT:      Head: Normocephalic and atraumatic. Nose: Nose normal.      Mouth/Throat:      Mouth: Mucous membranes are moist.      Comments: Apthous ulcerations --- lower gingival mucos, under the tonuge  Eyes:      General: No scleral icterus. Conjunctiva/sclera: Conjunctivae normal.   Cardiovascular:      Rate and Rhythm: Normal rate and regular rhythm. Heart sounds: Normal heart sounds. Pulmonary:      Effort: Pulmonary effort is normal. No respiratory distress. Breath sounds: Normal breath sounds. No wheezing or rales. Musculoskeletal:         General: Tenderness present. No swelling. Cervical back: Normal range of motion and neck supple. Comments: PIPs - hyper extension bilat. Shoulder: tender right , + cameron, speed, hawking, crank , yergenson. Hip - tender left outer hip. Lymphadenopathy:      Cervical: No cervical adenopathy. Skin:     General: Skin is warm and dry. Neurological:      Mental Status: She is alert and oriented to person, place, and time.    Psychiatric:         Behavior: Behavior normal.          LABS      Lab Results   Component Value Date    WBC 4.01 09/02/2022    HGB 13.0 09/02/2022    HGB 13.1 02/23/2021    HGB 12.3 01/28/2021    MCV 97.1 01/28/2021    MCHC 33.2 01/28/2021    RDW 13.5 07/13/2018     09/02/2022     02/23/2021     01/28/2021    NEUTROABS 4700 07/13/2018    LYMPHSABS 1.6 01/28/2021    LYMPHSABS 1.5 02/18/2019 LYMPHSABS 1400 07/13/2018    EOSABS 0.1 01/28/2021    BASOSABS 0.0 01/28/2021         Chemistry        Component Value Date/Time     10/05/2022 0000    K 3.7 10/05/2022 0000     10/05/2022 0000    CO2 31 10/05/2022 0000    BUN 17 10/05/2022 0000    CREATININE 0.8 10/05/2022 0000        Component Value Date/Time    CALCIUM 8.7 10/05/2022 0000    ALKPHOS 27 (L) 02/18/2019 0925    AST 19 02/18/2019 0925    ALT 13 02/18/2019 0925    BILITOT 0.3 02/18/2019 0925            Lab Results   Component Value Date    SEDRATE 93 (H) 01/28/2021    CRP 2.56 (H) 01/28/2021       Lab Results   Component Value Date/Time    VITD25 43.3 09/02/2022 12:00 AM       No results found for: C3, C4]  No results found for: ANASCRN, SSA, SSB, ANTI-SMITH, DSDNAAB, ANTIRNP  No results found for: C3, C4  No results found for: CCPAB, RF      RADIOLOGY / PROCEDURES:       ASSESSMENT/PLAN:     1. Undifferentiated connective tissue disease ([facility] [address]    2. Chronic right shoulder pain    3. Chronic midline low back pain without sciatica    4. Medication monitoring encounter    5. Trochanteric bursitis of left hip          UCTD - sjogren vs SLE  Aphthos ulcerations: active  Polyarthritis - active joitn swelling/tendernss. - + SSA ab, oral ulcerations, poarthralgia w/ joint swelling. Photosensitivty, mother w/ SLE.      - mother w/ hx of SLE, crohn's, RA. MGF w/ gout. -   - plaquenil 300mg daily 8/17/22    - request patient to repeat blood testing at this time    - daily sunblock daily recommended. - cont. Daily eye drops. Left hip pain - localized tender outer hip -- suspected troch bursitis. - hip stretches and exercises    - alt tx option , phys therapy, bursal injection. Right shoulder pain - ? Related to # 1. Prior MRI and orthopedics evaluation - w/ 2 prior steroid injections   - [facility] records in epic in the media tab. - repeat Right shoulder injection today give the persistent pain w/ associated insomnia. Medication monitoring - repeat labs q8--12 weeks  -- new now    - baseline plaquenil eye completed oct 2022. No follow-ups on file. New Prescriptions    No medications on file       11/29/2022    Electronically signed by Marcella Cheng DO on 8/17/22 at 7:51 AM EDT  Please contact the office if you have any questions or change of symptoms. PROCEDURE NOTE:     Date: 11/29/2022  Location : right shoulder   PROCEDURE: Arthrocentesis    Indication: pain, tenderness    Procedure details: After explaining risk and benefits of the procedure and informed consent, skin was prepped with Betadine and Chloroprep and anaesthetized with ethylene chloride spray. Under sterile fashion, joint was entered via posterior  approach  with 22 G needle and 0 ml's of fluid was withdrawn. Depo-Medrol (methylprednisolone 40 mg/ml)  with 0.5% bupivacaine without epinephrine was injected and the needle withdrawn. Procedure was well tolerated. Post injection care was discussed with patient. Call or return to clinic prn if such symptoms occur or there is failure to improve as anticipated.

## 2023-01-09 RX ORDER — BUSPIRONE HYDROCHLORIDE 15 MG/1
TABLET ORAL
Qty: 90 TABLET | OUTPATIENT
Start: 2023-01-09

## 2023-01-09 RX ORDER — LEVOTHYROXINE SODIUM 0.12 MG/1
TABLET ORAL
Qty: 90 TABLET | Refills: 1 | OUTPATIENT
Start: 2023-01-09

## 2023-02-27 ENCOUNTER — TELEPHONE (OUTPATIENT)
Dept: RHEUMATOLOGY | Age: 43
End: 2023-02-27

## 2023-03-02 ENCOUNTER — OFFICE VISIT (OUTPATIENT)
Dept: RHEUMATOLOGY | Age: 43
End: 2023-03-02
Payer: COMMERCIAL

## 2023-03-02 VITALS
OXYGEN SATURATION: 100 % | HEIGHT: 61 IN | DIASTOLIC BLOOD PRESSURE: 62 MMHG | WEIGHT: 125.8 LBS | SYSTOLIC BLOOD PRESSURE: 110 MMHG | HEART RATE: 67 BPM | BODY MASS INDEX: 23.75 KG/M2

## 2023-03-02 DIAGNOSIS — G89.29 CHRONIC MIDLINE LOW BACK PAIN WITHOUT SCIATICA: ICD-10-CM

## 2023-03-02 DIAGNOSIS — M35.9 UNDIFFERENTIATED CONNECTIVE TISSUE DISEASE (HCC): Primary | ICD-10-CM

## 2023-03-02 DIAGNOSIS — M70.62 TROCHANTERIC BURSITIS OF LEFT HIP: ICD-10-CM

## 2023-03-02 DIAGNOSIS — Z51.81 MEDICATION MONITORING ENCOUNTER: ICD-10-CM

## 2023-03-02 DIAGNOSIS — R76.8 SS-A ANTIBODY POSITIVE: ICD-10-CM

## 2023-03-02 DIAGNOSIS — M25.511 CHRONIC RIGHT SHOULDER PAIN: ICD-10-CM

## 2023-03-02 DIAGNOSIS — K12.1 ORAL ULCERATION: ICD-10-CM

## 2023-03-02 DIAGNOSIS — L56.8 PHOTOSENSITIVITY DERMATITIS: ICD-10-CM

## 2023-03-02 DIAGNOSIS — M54.50 CHRONIC MIDLINE LOW BACK PAIN WITHOUT SCIATICA: ICD-10-CM

## 2023-03-02 DIAGNOSIS — G89.29 CHRONIC RIGHT SHOULDER PAIN: ICD-10-CM

## 2023-03-02 PROCEDURE — 99214 OFFICE O/P EST MOD 30 MIN: CPT | Performed by: NURSE PRACTITIONER

## 2023-03-02 PROCEDURE — G8420 CALC BMI NORM PARAMETERS: HCPCS | Performed by: NURSE PRACTITIONER

## 2023-03-02 PROCEDURE — 1036F TOBACCO NON-USER: CPT | Performed by: NURSE PRACTITIONER

## 2023-03-02 PROCEDURE — G8427 DOCREV CUR MEDS BY ELIG CLIN: HCPCS | Performed by: NURSE PRACTITIONER

## 2023-03-02 PROCEDURE — G8484 FLU IMMUNIZE NO ADMIN: HCPCS | Performed by: NURSE PRACTITIONER

## 2023-03-02 RX ORDER — HYDROXYCHLOROQUINE SULFATE 200 MG/1
TABLET, FILM COATED ORAL
COMMUNITY
Start: 2023-02-04 | End: 2023-03-03

## 2023-03-02 ASSESSMENT — ENCOUNTER SYMPTOMS
NAUSEA: 0
EYE ITCHING: 0
DIARRHEA: 0
CONSTIPATION: 0
TROUBLE SWALLOWING: 0
ABDOMINAL PAIN: 0
COUGH: 0
BACK PAIN: 0
SHORTNESS OF BREATH: 0
EYE PAIN: 0

## 2023-03-02 NOTE — PROGRESS NOTES
[facility] RHEUMATOLOGY FOLLOW UP NOTE       Date Of Service: 3/2/2023  Provider: PELON He - DOT    Name: Ella Juarez   MRN: [id number]    Chief Complaint(s)     Chief Complaint   Patient presents with    Follow-up     2 mo f/u    Pt stated pain 6/10 - Lower back, bilateral hips        History of Present Illness (HPI)     Ella Juarez  is a(n)42 y.o. female with a hx of anemia, GERD, headaches, hypothyroidism, low back pain, mixed anxiety depressive disorder, mixed hyperlipidemia, obesity here for the f/u evaluation of undiff connective tissue disease)     Interval hx:    - no oral sores for the past month or so    pain affecting the right shoulder, left hip, low back  Pain on a scale 0-10: 6  Type of pain: intermittent  Timing:none  Aggravating factors: shoulder: increased use. Hip: walking    Associated symptoms:  denies swelling/  Redness/ warmth, denies AM stiffness      REVIEW OF SYSTEMS: (ROS)    Review of Systems   Constitutional:  Negative for fatigue, fever and unexpected weight change. HENT:  Negative for congestion and trouble swallowing. Eyes:  Negative for pain and itching. Respiratory:  Negative for cough and shortness of breath. Cardiovascular:  Negative for chest pain and leg swelling. Gastrointestinal:  Negative for abdominal pain, constipation, diarrhea and nausea. Endocrine: Negative for cold intolerance and heat intolerance. Genitourinary:  Negative for difficulty urinating, frequency and urgency. Musculoskeletal:  Positive for arthralgias. Negative for back pain and joint swelling. Skin:  Negative for rash. Neurological:  Negative for dizziness, weakness, numbness and headaches. Psychiatric/Behavioral:  The patient is not nervous/anxious.       PAST MEDICAL HISTORY      Past Medical History:   Diagnosis Date    Anemia     Gastroesophageal reflux disease 10/19/2020    Headache     Hypothyroidism     Low back pain 10/19/2020    Mixed anxiety depressive disorder 10/19/2020    Mixed hyperlipidemia 10/19/2020    Obesity        PAST SURGICAL HISTORY      Past Surgical History:   Procedure Laterality Date    CARPAL TUNNEL RELEASE Right      SECTION      COLON SURGERY      HYSTERECTOMY (CERVIX STATUS UNKNOWN)      SLEEVE GASTRECTOMY         FAMILY HISTORY      Family History   Problem Relation Age of Onset    Anemia Sister     Asthma Sister     Arthritis Maternal Grandmother     Cancer Maternal Grandmother     Hearing Loss Maternal Grandmother     [de-identified] / Stillbirths Maternal Grandmother     Allergy (Severe) Maternal Grandfather     Arthritis Maternal Grandfather     Arrhythmia Maternal Grandfather     Asthma Maternal Grandfather     Coronary Art Dis Maternal Grandfather     Depression Maternal Grandfather     Diabetes Maternal Grandfather     Hearing Loss Maternal Grandfather     High Blood Pressure Maternal Grandfather     Gout Maternal Grandfather     Lupus Mother     Drug Abuse Mother     Rheum Arthritis Mother     Crohn's Disease Mother        SOCIAL HISTORY      Social History       Tobacco History       Smoking Status  Never      Smokeless Tobacco Use  Never              Alcohol History       Alcohol Use Status  No              Drug Use       Drug Use Status  Yes Types  Marijuana (Weed) Comment  medical marijuana              Sexual Activity       Sexually Active  Yes Partners  Male                    ALLERGIES   No Known Allergies    CURRENT MEDICATIONS      Current Outpatient Medications   Medication Sig Dispense Refill    hydroxychloroquine (PLAQUENIL) 200 MG tablet TAKE 1 AND 1/2 TABLETS BY MOUTH EVERY DAY      cariprazine hcl (VRAYLAR) 3 MG CAPS capsule Take 1 capsule by mouth daily 30 capsule 3    busPIRone (BUSPAR) 15 MG tablet Take 15 mg by mouth 2 times daily 60 tablet 3    levothyroxine (SYNTHROID) 125 MCG tablet take 1 tablet by mouth once daily 90 tablet 1    hydrOXYzine (VISTARIL) 25 MG capsule Take 1 capsule by mouth 3 times daily as needed for Anxiety 30 capsule 1    hydrocortisone 2.5 % ointment Apply topically 2 times daily. 28.35 g 0    diclofenac sodium (VOLTAREN) 1 % GEL Apply 2 g topically 2 times daily To shoulder 150 g 1    lidocaine (LMX) 4 % cream Apply3 times daily to shoudler topically as needed. 1 Tube 2    ondansetron (ZOFRAN-ODT) 4 MG disintegrating tablet Take 1 tablet by mouth 3 times daily as needed for Nausea or Vomiting 60 tablet 1    aMILoride (MIDAMOR) 5 MG tablet Take 1 tablet by mouth daily 90 tablet 3    L-Methylfolate-Algae (DEPLIN 15) 15-90.314 MG CAPS Take 1 capsule by mouth daily (Patient not taking: Reported on 3/2/2023) 90 capsule 3    cetirizine (ZYRTEC ALLERGY) 10 MG tablet Take 1 tablet by mouth daily as needed for Allergies (Patient not taking: Reported on 3/2/2023) 90 tablet 1    famotidine (PEPCID) 20 MG tablet Take 20 mg by mouth daily  (Patient not taking: Reported on 3/2/2023)       No current facility-administered medications for this visit. PHYSICAL EXAMINATION / OBJECTIVE   Objective:  /62 (Site: Left Upper Arm, Position: Sitting, Cuff Size: Large Adult)   Pulse 67   Ht 5' 0.98\" (1.549 m)   Wt 125 lb 12.8 oz (57.1 kg)   SpO2 100%   BMI 23.78 kg/m²     Physical Exam  Vitals reviewed. Constitutional:       Appearance: She is well-developed. Cardiovascular:      Rate and Rhythm: Normal rate and regular rhythm. Pulmonary:      Effort: Pulmonary effort is normal.      Breath sounds: Normal breath sounds. Musculoskeletal:      Cervical back: Normal range of motion and neck supple. Skin:     General: Skin is warm and dry. Findings: No rash. Neurological:      Mental Status: She is alert and oriented to person, place, and time. Psychiatric:         Thought Content:  Thought content normal.       Upper extremities:    SHOULDERS tender right  ELBOWS nontender  WRISTS nontender, no swelling  HANDS/FINGERS nontender, no swelling    Lower extremities:  HIPS tender left outer hips  KNEES nontender  ANKLES notender   FEET : nontneder        LABS    CBC  Lab Results   Component Value Date/Time    WBC 4.01 09/02/2022 12:00 AM    RBC 4.05 09/02/2022 12:00 AM    HGB 13.0 09/02/2022 12:00 AM    HCT 39.7 09/02/2022 12:00 AM    MCV 97.1 01/28/2021 10:40 AM    MCH 32.3 01/28/2021 10:40 AM    MCHC 33.2 01/28/2021 10:40 AM    RDW 13.5 07/13/2018 08:57 AM     09/02/2022 12:00 AM       CMP  Lab Results   Component Value Date/Time    CALCIUM 8.7 10/05/2022 12:00 AM    LABALBU 4.5 02/18/2019 09:25 AM    PROT 7.7 02/18/2019 09:25 AM     10/05/2022 12:00 AM    K 3.7 10/05/2022 12:00 AM    CO2 31 10/05/2022 12:00 AM     10/05/2022 12:00 AM    BUN 17 10/05/2022 12:00 AM    CREATININE 0.8 10/05/2022 12:00 AM    ALKPHOS 27 02/18/2019 09:25 AM    ALT 13 02/18/2019 09:25 AM    AST 19 02/18/2019 09:25 AM       HgBA1c: No components found for: HGBA1C    Lab Results   Component Value Date/Time    VITD25 43.3 09/02/2022 12:00 AM         No results found for: ANASCRN  No results found for: SSA  No results found for: SSB  No results found for: ANTI-SMITH  No results found for: DSDNAAB   No results found for: ANTIRNP  No results found for: C3, C4  No results found for: CCPAB  No results found for: RF    No components found for: CANCASCRN, APANCASCRN  Lab Results   Component Value Date    SEDRATE 93 (H) 01/28/2021     Lab Results   Component Value Date    CRP 2.56 (H) 01/28/2021       RADIOLOGY:         ASSESSMENT/PLAN    Assessment   Plan     UTCD- sjogrens vs SLE  Apthous ulceration  Polyarthritis  - + SSA ab, oral ulceration, polyarthralgia w/ joint swelling, photosensitivity, mother w/ SLE, crohn's, RA. MGF w/ gout   - plaquenil 300 mg daily (8/2022)   - daily sunblock recommended   - continue daily eye drops    Left hip pain- suspected troch bursitis   - continue with stretching and exercises    Right shoulder pain- ? Related to #1. Prior MRI and ortho evaluation.  2 steroid injections by ortho. Repeat shoulder injection 11/2022. Medication monitoring   - labs q 8-12 weeks- overdue   - plaquenil eye exam (10/2022)      Return in about 4 months (around 7/2/2023). Electronically signed by PELON Altman CNP on 3/2/2023 at 1:06 PM    New Prescriptions    No medications on file       Thank you for allowing me to participate in the care of this patient. Please call if there are any questions.

## 2023-03-03 DIAGNOSIS — M35.9 SYSTEMIC INVOLVEMENT OF CONNECTIVE TISSUE, UNSPECIFIED (HCC): ICD-10-CM

## 2023-03-03 RX ORDER — HYDROXYCHLOROQUINE SULFATE 200 MG/1
TABLET, FILM COATED ORAL
Qty: 135 TABLET | Refills: 1 | Status: SHIPPED | OUTPATIENT
Start: 2023-03-03

## 2023-03-09 LAB
ALBUMIN SERPL-MCNC: 3.9 G/DL
ALP BLD-CCNC: 22 U/L
ALT SERPL-CCNC: 12 U/L
ANION GAP SERPL CALCULATED.3IONS-SCNC: 9 MMOL/L
AST SERPL-CCNC: 33 U/L
BASOPHILS ABSOLUTE: 0.05 /ΜL
BASOPHILS RELATIVE PERCENT: 1 %
BILIRUB SERPL-MCNC: 0.3 MG/DL (ref 0.1–1.4)
BILIRUBIN, URINE: NEGATIVE
BLOOD, URINE: POSITIVE
BUN BLDV-MCNC: 18 MG/DL
C-REACTIVE PROTEIN: 6.1
CALCIUM SERPL-MCNC: 8.5 MG/DL
CHLORIDE BLD-SCNC: 104 MMOL/L
CLARITY: ABNORMAL
CO2: 27 MMOL/L
COLOR: YELLOW
CREAT SERPL-MCNC: 0.8 MG/DL
EGFR: 84
EOSINOPHILS ABSOLUTE: 0.04 /ΜL
EOSINOPHILS RELATIVE PERCENT: 0.8 %
GLUCOSE BLD-MCNC: 87 MG/DL
GLUCOSE URINE: NEGATIVE
HCT VFR BLD CALC: 34.4 % (ref 36–46)
HEMOGLOBIN: 11.7 G/DL (ref 12–16)
KETONES, URINE: NEGATIVE
LEUKOCYTE ESTERASE, URINE: NEGATIVE
LYMPHOCYTES ABSOLUTE: 1.49 /ΜL
LYMPHOCYTES RELATIVE PERCENT: 29.9 %
MCH RBC QN AUTO: 32 PG
MCHC RBC AUTO-ENTMCNC: 34 G/DL
MCV RBC AUTO: 94.2 FL
MONOCYTES ABSOLUTE: 0.39 /ΜL
MONOCYTES RELATIVE PERCENT: 7.8 %
NEUTROPHILS ABSOLUTE: 3.01 /ΜL
NEUTROPHILS RELATIVE PERCENT: 60.3 %
NITRITE, URINE: NEGATIVE
PDW BLD-RTO: 41.2 %
PH UA: 6 (ref 4.5–8)
PLATELET # BLD: 202 K/ΜL
PMV BLD AUTO: 10.8 FL
POTASSIUM SERPL-SCNC: 3.6 MMOL/L
PROTEIN UA: NEGATIVE
RBC # BLD: 3.65 10^6/ΜL
SEDIMENTATION RATE, ERYTHROCYTE: 25
SODIUM BLD-SCNC: 136 MMOL/L
SPECIFIC GRAVITY, URINE: 1.03
TOTAL PROTEIN: 6.7
UROBILINOGEN, URINE: NORMAL
WBC # BLD: 4.99 10^3/ML

## 2023-03-10 ENCOUNTER — TELEMEDICINE (OUTPATIENT)
Dept: PSYCHIATRY | Age: 43
End: 2023-03-10
Payer: COMMERCIAL

## 2023-03-10 DIAGNOSIS — F34.0 CYCLOTHYMIA: Primary | ICD-10-CM

## 2023-03-10 DIAGNOSIS — F41.1 GENERALIZED ANXIETY DISORDER: ICD-10-CM

## 2023-03-10 PROCEDURE — 99214 OFFICE O/P EST MOD 30 MIN: CPT | Performed by: NURSE PRACTITIONER

## 2023-03-10 PROCEDURE — G8427 DOCREV CUR MEDS BY ELIG CLIN: HCPCS | Performed by: NURSE PRACTITIONER

## 2023-03-10 PROCEDURE — 1036F TOBACCO NON-USER: CPT | Performed by: NURSE PRACTITIONER

## 2023-03-10 PROCEDURE — G8420 CALC BMI NORM PARAMETERS: HCPCS | Performed by: NURSE PRACTITIONER

## 2023-03-10 PROCEDURE — G8484 FLU IMMUNIZE NO ADMIN: HCPCS | Performed by: NURSE PRACTITIONER

## 2023-03-10 RX ORDER — BUSPIRONE HYDROCHLORIDE 15 MG/1
15 TABLET ORAL 2 TIMES DAILY
Qty: 60 TABLET | Refills: 2 | Status: SHIPPED | OUTPATIENT
Start: 2023-03-10

## 2023-03-10 NOTE — PROGRESS NOTES
[address] [place] [address]  Dept: [phone]  Dept Fax: [phone]: [phone]    Visit Date: 3/10/2023    TELEPSYCHIATRY VISIT -- Audio/Visual (During XOGYE-76 public health emergency)    Koffi Oneill is a 43 y.o. female being evaluated by a Virtual Visit (video visit) encounter to address concerns as mentioned below. Patient identification was verified and a caregiver was present when appropriate. Pursuant to the emergency declaration under the [address] [place] [address], [address] [place] [address] authority and the [employer] and [employer] Act, this Virtual Visit was conducted with patient's (and/or legal guardian's) consent, to reduce the patient's risk of exposure to COVID-19 and provide necessary medical care. The patient (or guardian if applicable) is aware that this is a billable service, which includes applicable co-pays. The patient (and/or legal guardian) has also been advised to contact this office for worsening conditions or problems, and seek emergency medical treatment and/or call 911 if deemed necessary. Services were provided through a synchronous (real-time) audio-video encounter to substitute for in-person clinic visit. The patient was located in a state where the provider was licensed to provide care. Patient was in her car and provider was at her home. SUBJECTIVE DATA     CHIEF COMPLAINT:    Chief Complaint   Patient presents with    Mental Health Problem    Anxiety    Follow-up       History obtained from: patient    HISTORY OF PRESENT ILLNESS:    Koffi Oneill is a 43 y.o. female who presents via virtual video visit for follow-up on complaints of depression and anxiety.      Doing pretty well overall  -denies feeling down, sad, depressed  -states \"things are going pretty good\"  -denies impulsivity  -good motivation  -antione anhedonia  -denies feeling worthless, hopeless, helpless  -denies mood swings    Wonders if she needs an increase in Buspar  -sometimes doesn't feel like it is doing what she needs it to do  -feels like it doesn't help control the symptoms of anxiety when they occur; this happens frequently  -describes anxiety as worry, nervousness, racing thoughts, restlessness    Sleeping well overall  -able to initiate sleep  -still with some problems maintaining sleep on occasion  -feels her sleep is better overall than it used to be      Denies suicidal ideations, intent, plan. No homicidal ideations, intent, plan. No audiovisual hallucinations. HPI    The patient has had 1 lifetime suicide attempts. Methods used for the suicide attempts include overdose on medications. The patient's most recent suicide attempt was 2007. Patient reports 1 psych hospital admissions with the last admission taking place 2007. NOTE: Patient states she did attempt suicide as a teenager but does not recall the number of times, methods or number of hospitalizations. The above information reflects SA as an adult.     Past psychiatric medications include: \"too many to list\" Prozac, Zoloft, Celexa, Cymbalta, BuSpar, Wellbutrin, Trintellix, Paxil    Adverse reactions from psychotropic medications:  None at this time      Current Psychiatric Review of Systems         Jazlyn or Hypomania:  no     Panic Attacks:  no     Phobias:  no     Obsessions and Compulsions:  no     Body or Vocal Tics:  no     Hallucinations:  no     Delusions:  no    SOCIAL HISTORY:  Patient was born in  [place]  and raised by her  maternal grandparents      Social History     Socioeconomic History    Marital status:      Spouse name: Not on file    Number of children: 7    Years of education: Not on file    Highest education level: Master's degree (e.g., MA, MS, Selena, MEd, MSW, LINDA)   Occupational History    Occupation: advocacy coordinator   Tobacco Use    Smoking status: Never    Smokeless tobacco: Never   Vaping Use    Vaping Use: Never used   Substance and Sexual Activity    Alcohol use: No    Drug use: Yes     Types: Marijuana Charmayne Stai)     Comment: medical marijuana    Sexual activity: Yes     Partners: Male   Other Topics Concern    Not on file   Social History Narrative    06/10/2022    LEVEL OF EDUCATION: graduated high school; earned her bachelor degrees in both social work and criminal justice; earned her master's degree social work    SPECIAL EDUCATION NEEDS: None    RESIDENCE: Currently lives with her children; mom    LEGAL HISTORY: None    Adventism: None    TRAUMA: Yes - does not want to disclose about this at this time    : None    HOBBIES: crafts; spending time with her children    EMPLOYMENT: 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 Started this position Jan. 31, 2021. 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 She had been in this position since 2014. She has turned in her resignation for this position but will be staying on part-time until the position is filled. She started her new position     MARRIAGES: two. The first marriage was in 2004 and they  after 1.5 years. Second marriage lasted until 2016. They are  after 15 years. CHILDREN: 7     SUBSTANCE USE:    1. Marijuana: medical marijuana - using 2 or 3 times per week. Tried it in high school but didn't start using the medical marijuana until Aug. 2020. Admits she has been using more recently because she has more stressors    2.  Cocaine: last use was Feb 2022 or March 2022     Social Determinants of Health     Financial Resource Strain: Not on file   Food Insecurity: Not on file   Transportation Needs: Not on file   Physical Activity: Not on file   Stress: Not on file   Social Connections: Not on file   Intimate Partner Violence: Not on file   Housing Stability: Not on file       FAMILY HISTORY:   Family History   Problem Relation Age of Onset    Anemia Sister     Asthma Sister     Arthritis Maternal Grandmother     Cancer Maternal Grandmother     Hearing Loss Maternal Grandmother     [de-identified] / Stillbirths Maternal Grandmother     Allergy (Severe) Maternal Grandfather     Arthritis Maternal Grandfather     Arrhythmia Maternal Grandfather     Asthma Maternal Grandfather     Coronary Art Dis Maternal Grandfather     Depression Maternal Grandfather     Diabetes Maternal Grandfather     Hearing Loss Maternal Grandfather     High Blood Pressure Maternal Grandfather     Gout Maternal Grandfather     Lupus Mother     Drug Abuse Mother     Rheum Arthritis Mother     Crohn's Disease Mother        Psychiatric Family History  Son has \"mental health issues\"    PAST MEDICAL HISTORY:    Past Medical History:   Diagnosis Date    Anemia     Gastroesophageal reflux disease 10/19/2020    Headache     Hypothyroidism     Low back pain 10/19/2020    Mixed anxiety depressive disorder 10/19/2020    Mixed hyperlipidemia 10/19/2020    Obesity        PAST SURGICAL HISTORY:    Past Surgical History:   Procedure Laterality Date    CARPAL TUNNEL RELEASE Right      SECTION      COLON SURGERY      HYSTERECTOMY (CERVIX STATUS UNKNOWN)      SLEEVE GASTRECTOMY         PREVIOUSMEDICATIONS:  Outpatient Medications Prior to Visit   Medication Sig Dispense Refill    hydroxychloroquine (PLAQUENIL) 200 MG tablet TAKE 1 AND 1/2 TABLETS BY MOUTH EVERY  tablet 1    aMILoride (MIDAMOR) 5 MG tablet Take 1 tablet by mouth daily 90 tablet 3    levothyroxine (SYNTHROID) 125 MCG tablet take 1 tablet by mouth once daily 90 tablet 1    hydrOXYzine (VISTARIL) 25 MG capsule Take 1 capsule by mouth 3 times daily as needed for Anxiety 30 capsule 1    lidocaine (LMX) 4 % cream Apply3 times daily to shoudler topically as needed.  1 Tube 2    ondansetron (ZOFRAN-ODT) 4 MG disintegrating tablet Take 1 tablet by mouth 3 times daily as needed for Nausea or Vomiting 60 tablet 1    cariprazine hcl (VRAYLAR) 3 MG CAPS capsule Take 1 capsule by mouth daily 30 capsule 3    busPIRone (BUSPAR) 15 MG tablet Take 15 mg by mouth 2 times daily (Patient taking differently: Take 15 mg by mouth daily) 60 tablet 3    L-Methylfolate-Algae (DEPLIN 15) 15-90.314 MG CAPS Take 1 capsule by mouth daily (Patient not taking: No sig reported) 90 capsule 3    hydrocortisone 2.5 % ointment Apply topically 2 times daily. 28.35 g 0    cetirizine (ZYRTEC ALLERGY) 10 MG tablet Take 1 tablet by mouth daily as needed for Allergies (Patient not taking: Reported on 3/2/2023) 90 tablet 1    diclofenac sodium (VOLTAREN) 1 % GEL Apply 2 g topically 2 times daily To shoulder 150 g 1    famotidine (PEPCID) 20 MG tablet Take 20 mg by mouth daily  (Patient not taking: Reported on 3/2/2023)       No facility-administered medications prior to visit. ALLERGIES:    Patient has no known allergies. REVIEW OF SYSTEMS:    Review of Systems    The patient sees Claudell Kand, APRN - CNP as her primary care provider. SPECIALISTS: nephrology for the hypokalemia; bariatric surgeon (Dr. Alondra Taylor)    OBJECTIVE DATA     There were no vitals taken for this visit. Physical Exam    Mental Status Evaluation:   Orientation: Alert, oriented, thought content appropriate   Mood:. Anxious      Affect:  Normal      Appearance:  Age Appropriate, Casually Dressed, Clean, Well Groomed, Clothing Appropriate for Age and Clothing Appropriate for Weather   Activity:  Appears tired, Cooperative, Good Eye Contact, and Seated Calmly   Gait/Posture: Normal   Speech:  Clear, Fluent, Normal Pitch and Volume, Age and Situation Appropriate   Thought Process: Within Normal Limits   Thought Content:   Within Normal Limits   Cognition:  Grossly Intact   Memory: Intact   Insight:  Good   Judgment: Good   Suicidal Ideations: Denies Suicidal Ideation   Homicidal Ideations: Negative for homicidal ideation   Medication Side Effects: Absent        Attention Span Attention span and concentration were age appropriate       Screenings Completed in This Encounter:     Anxiety and Depression:                    DIAGNOSIS AND ASSESSMENT DATA     DIAGNOSIS:   1. Cyclothymia    2. Generalized anxiety disorder      R/O Personality Disorder    PLAN   Follow-up:  No follow-ups on file. Prescriptions for this encounter:  New Prescriptions    No medications on file       Orders Placed This Encounter   Medications    busPIRone (BUSPAR) 15 MG tablet     Sig: Take 15 mg by mouth 2 times daily     Dispense:  60 tablet     Refill:  2    cariprazine hcl (VRAYLAR) 3 MG CAPS capsule     Sig: Take 1 capsule by mouth daily     Dispense:  30 capsule     Refill:  2       Medications Discontinued During This Encounter   Medication Reason    cetirizine (ZYRTEC ALLERGY) 10 MG tablet LIST CLEANUP    famotidine (PEPCID) 20 MG tablet LIST CLEANUP    L-Methylfolate-Algae (DEPLIN 15) 15-90.314 MG CAPS Cost of medication    busPIRone (BUSPAR) 15 MG tablet REORDER    cariprazine hcl (VRAYLAR) 3 MG CAPS capsule REORDER       Additional orders:  No orders of the defined types were placed in this encounter.    -patient will return to taking BuSpar BID as prescribed  -continue other current medications without changes; refills provided  -Patient is encouraged to utilize nonpharmacologic coping skills such as deep breathing, guided imagery, guided meditation, muscle relaxation, calming music, and/or journaling. Risks, potential side effects, possibledrug-drug interactions, benefits and alternate treatments discussed in detail. All questions answered. Patient stated understanding and is agreeable to treatment plan. Patient has been instructed to seek emergency help via the emergency and/or calling 911 should symptoms become severe, worsen, or with other concerning symptoms.  Patient instructed to goimmediately to the emergency room and/or call 911 with any suicidal or homicidal ideations or if audio/visual hallucinations develop  Patient stated understanding and agrees. Patient given crisis center information. Provider Signature:  Electronically signed by PELON Kaur CNP on 3/10/2023 at 10:27 AM    **This report has been created using voice recognition software. It may contain minor errors which are inherent in voice recognition technology. **

## 2023-06-09 ENCOUNTER — TELEMEDICINE (OUTPATIENT)
Dept: PSYCHIATRY | Age: 43
End: 2023-06-09
Payer: COMMERCIAL

## 2023-06-09 DIAGNOSIS — F41.1 GENERALIZED ANXIETY DISORDER: ICD-10-CM

## 2023-06-09 DIAGNOSIS — F34.0 CYCLOTHYMIA: Primary | ICD-10-CM

## 2023-06-09 PROCEDURE — G8427 DOCREV CUR MEDS BY ELIG CLIN: HCPCS | Performed by: NURSE PRACTITIONER

## 2023-06-09 PROCEDURE — 1036F TOBACCO NON-USER: CPT | Performed by: NURSE PRACTITIONER

## 2023-06-09 PROCEDURE — G8420 CALC BMI NORM PARAMETERS: HCPCS | Performed by: NURSE PRACTITIONER

## 2023-06-09 PROCEDURE — 99214 OFFICE O/P EST MOD 30 MIN: CPT | Performed by: NURSE PRACTITIONER

## 2023-06-09 RX ORDER — BUSPIRONE HYDROCHLORIDE 15 MG/1
15 TABLET ORAL 2 TIMES DAILY
Qty: 60 TABLET | Refills: 3 | Status: SHIPPED | OUTPATIENT
Start: 2023-06-09

## 2023-06-09 ASSESSMENT — PATIENT HEALTH QUESTIONNAIRE - PHQ9
2. FEELING DOWN, DEPRESSED OR HOPELESS: NOT AT ALL
SUM OF ALL RESPONSES TO PHQ QUESTIONS 1-9: 0
1. LITTLE INTEREST OR PLEASURE IN DOING THINGS: NOT AT ALL
SUM OF ALL RESPONSES TO PHQ QUESTIONS 1-9: 0
SUM OF ALL RESPONSES TO PHQ QUESTIONS 1-9: 0
1. LITTLE INTEREST OR PLEASURE IN DOING THINGS: 0
2. FEELING DOWN, DEPRESSED OR HOPELESS: 0
SUM OF ALL RESPONSES TO PHQ QUESTIONS 1-9: 0
SUM OF ALL RESPONSES TO PHQ9 QUESTIONS 1 & 2: 0
SUM OF ALL RESPONSES TO PHQ9 QUESTIONS 1 & 2: 0

## 2023-06-15 LAB
ALBUMIN SERPL-MCNC: 4.3 G/DL
ALP BLD-CCNC: 34 U/L
ALT SERPL-CCNC: 15 U/L
ANION GAP SERPL CALCULATED.3IONS-SCNC: 12 MMOL/L
AST SERPL-CCNC: 35 U/L
BASOPHILS ABSOLUTE: 0.03 /ΜL
BASOPHILS RELATIVE PERCENT: 0.5 %
BILIRUB SERPL-MCNC: 0.3 MG/DL (ref 0.1–1.4)
BILIRUBIN, URINE: NEGATIVE
BLOOD, URINE: POSITIVE
BUN BLDV-MCNC: 20 MG/DL
CALCIUM SERPL-MCNC: 8.7 MG/DL
CHLORIDE BLD-SCNC: 104 MMOL/L
CLARITY: CLEAR
CO2: 25 MMOL/L
COLOR: YELLOW
CREAT SERPL-MCNC: 1 MG/DL
EGFR: 64
EOSINOPHILS ABSOLUTE: 0.08 /ΜL
EOSINOPHILS RELATIVE PERCENT: 1.4 %
GLUCOSE BLD-MCNC: 69 MG/DL
GLUCOSE URINE: NEGATIVE
HCT VFR BLD CALC: 37.9 % (ref 36–46)
HEMOGLOBIN: 12.2 G/DL (ref 12–16)
KETONES, URINE: NEGATIVE
LEUKOCYTE ESTERASE, URINE: NEGATIVE
LYMPHOCYTES ABSOLUTE: 1.42 /ΜL
LYMPHOCYTES RELATIVE PERCENT: 25.1 %
MCH RBC QN AUTO: 30.5 PG
MCHC RBC AUTO-ENTMCNC: 32.2 G/DL
MCV RBC AUTO: 94.8 FL
MONOCYTES ABSOLUTE: 0.44 /ΜL
MONOCYTES RELATIVE PERCENT: 7.8 %
NEUTROPHILS ABSOLUTE: 3.68 /ΜL
NEUTROPHILS RELATIVE PERCENT: 65 %
NITRITE, URINE: NEGATIVE
PDW BLD-RTO: 42.5 %
PH UA: 5.5 (ref 4.5–8)
PLATELET # BLD: 259 K/ΜL
PMV BLD AUTO: 10 FL
POTASSIUM SERPL-SCNC: 3.9 MMOL/L
PROTEIN UA: NEGATIVE
RBC # BLD: 4 10^6/ΜL
SEDIMENTATION RATE, ERYTHROCYTE: 27
SODIUM BLD-SCNC: 137 MMOL/L
SPECIFIC GRAVITY, URINE: 1.03
TOTAL PROTEIN: 7.6
UROBILINOGEN, URINE: NORMAL
WBC # BLD: 5.66 10^3/ML

## 2023-07-06 ENCOUNTER — OFFICE VISIT (OUTPATIENT)
Dept: RHEUMATOLOGY | Age: 43
End: 2023-07-06
Payer: COMMERCIAL

## 2023-07-06 VITALS
OXYGEN SATURATION: 99 % | DIASTOLIC BLOOD PRESSURE: 60 MMHG | HEART RATE: 61 BPM | SYSTOLIC BLOOD PRESSURE: 126 MMHG | BODY MASS INDEX: 24.35 KG/M2 | HEIGHT: 61 IN | WEIGHT: 129 LBS

## 2023-07-06 DIAGNOSIS — K12.1 ORAL ULCERATION: ICD-10-CM

## 2023-07-06 DIAGNOSIS — M35.9 SYSTEMIC INVOLVEMENT OF CONNECTIVE TISSUE, UNSPECIFIED (HCC): Primary | ICD-10-CM

## 2023-07-06 DIAGNOSIS — M70.62 TROCHANTERIC BURSITIS OF LEFT HIP: ICD-10-CM

## 2023-07-06 DIAGNOSIS — Z51.81 MEDICATION MONITORING ENCOUNTER: ICD-10-CM

## 2023-07-06 PROCEDURE — G8420 CALC BMI NORM PARAMETERS: HCPCS | Performed by: INTERNAL MEDICINE

## 2023-07-06 PROCEDURE — G8427 DOCREV CUR MEDS BY ELIG CLIN: HCPCS | Performed by: INTERNAL MEDICINE

## 2023-07-06 PROCEDURE — 1036F TOBACCO NON-USER: CPT | Performed by: INTERNAL MEDICINE

## 2023-07-06 PROCEDURE — 99214 OFFICE O/P EST MOD 30 MIN: CPT | Performed by: INTERNAL MEDICINE

## 2023-07-06 RX ORDER — FOLIC ACID 1 MG/1
1 TABLET ORAL DAILY
Qty: 90 TABLET | Refills: 1 | Status: SHIPPED | OUTPATIENT
Start: 2023-07-06

## 2023-07-06 ASSESSMENT — ENCOUNTER SYMPTOMS
GASTROINTESTINAL NEGATIVE: 1
EYES NEGATIVE: 1
RESPIRATORY NEGATIVE: 1

## 2023-07-06 NOTE — PROGRESS NOTES
[facility] RHEUMATOLOGY FOLLOW UP NOTE       Date Of Service: 7/6/2023  Provider: Jacqueline Borja DO, DO  PCP: PELON Adames - [place]   Name: Bernardo Cottrell   MRN: [id number]      Subjective     Chief Complaint   Patient presents with    Follow-up     4 month follow up        Bernardo Cottrell  is a(n)43 y.o. female here for the f/u evaluation of anemia, GERD, headaches, hypothyroidism, low back pain, mixed anxiety depressive disorder, mixed hyperlipidemia, obesity here for the f/u evaluation of undiff connective tissue disease)      Interval hx:                - continue. Oral ulcerations  buccal mucosa, gingival fold and below the tongue. + painful. Arthralgia neck, shoulder and left outer hips - intermittent up to 5/10, typically around 2/10 over the Timing:morning   Aggravating factors: shoulder: increased use. Hip: walking  AM stiffness lasting 60 minutes. Photosensitivity - using sunblock and SPF clothing. REVIEW OF SYSTEMS: (ROS)    Review of Systems   Constitutional:  Positive for fatigue. HENT: Negative. Eyes: Negative. Respiratory: Negative. Cardiovascular: Negative. Gastrointestinal: Negative. Endocrine: Negative. Genitourinary: Negative. Skin: Negative. Neurological: Negative. Hematological: Negative. PmHx:  has a past medical history of Anemia, Gastroesophageal reflux disease, Headache, Hypothyroidism, Low back pain, Mixed anxiety depressive disorder, Mixed hyperlipidemia, and Obesity. Social History:  reports that she has never smoked. She has never used smokeless tobacco. She reports current drug use. Drug: Marijuana Jacquenette Pun). She reports that she does not drink alcohol.     No Known Allergies      Current Outpatient Medications:     busPIRone (BUSPAR) 15 MG tablet, Take 15 mg by mouth 2 times daily, Disp: 60 tablet, Rfl: 3    cariprazine hcl (VRAYLAR) 3 MG CAPS capsule, Take 1 capsule by mouth daily, Disp: 30 capsule, Rfl: 3    hydroxychloroquine

## 2023-09-02 DIAGNOSIS — M35.9 SYSTEMIC INVOLVEMENT OF CONNECTIVE TISSUE, UNSPECIFIED (HCC): ICD-10-CM

## 2023-09-07 LAB
ALBUMIN SERPL-MCNC: 4 G/DL
ALP BLD-CCNC: 25 U/L
ALT SERPL-CCNC: 17 U/L
ANION GAP SERPL CALCULATED.3IONS-SCNC: 12 MMOL/L
AST SERPL-CCNC: 31 U/L
BASOPHILS ABSOLUTE: 0.03 /ΜL
BASOPHILS RELATIVE PERCENT: 0.9 %
BILIRUB SERPL-MCNC: 0.6 MG/DL (ref 0.1–1.4)
BILIRUBIN, URINE: NEGATIVE
BLOOD, URINE: POSITIVE
BUN BLDV-MCNC: 14 MG/DL
CALCIUM SERPL-MCNC: 9.2 MG/DL
CHLORIDE BLD-SCNC: 104 MMOL/L
CLARITY: ABNORMAL
CO2: 26 MMOL/L
COLOR: YELLOW
CREAT SERPL-MCNC: 0.7 MG/DL
EGFR: 97
EOSINOPHILS ABSOLUTE: 0.07 /ΜL
EOSINOPHILS RELATIVE PERCENT: 2.1 %
GLUCOSE BLD-MCNC: 85 MG/DL
GLUCOSE URINE: NEGATIVE
HCT VFR BLD CALC: 36.9 % (ref 36–46)
HEMOGLOBIN: 12.1 G/DL (ref 12–16)
KETONES, URINE: NEGATIVE
LEUKOCYTE ESTERASE, URINE: NEGATIVE
LYMPHOCYTES ABSOLUTE: 1.23 /ΜL
LYMPHOCYTES RELATIVE PERCENT: 36.7 %
MCH RBC QN AUTO: 30.9 PG
MCHC RBC AUTO-ENTMCNC: 32.8 G/DL
MCV RBC AUTO: 94.4 FL
MONOCYTES ABSOLUTE: 0.28 /ΜL
MONOCYTES RELATIVE PERCENT: 8.4 %
NEUTROPHILS ABSOLUTE: 1.73 /ΜL
NEUTROPHILS RELATIVE PERCENT: 51.6 %
NITRITE, URINE: NEGATIVE
PDW BLD-RTO: 42.9 %
PH UA: 5.5 (ref 4.5–8)
PLATELET # BLD: 160 K/ΜL
PMV BLD AUTO: 10.8 FL
POTASSIUM SERPL-SCNC: 3.7 MMOL/L
PROTEIN UA: NEGATIVE
RBC # BLD: 3.91 10^6/ΜL
SEDIMENTATION RATE, ERYTHROCYTE: 17
SODIUM BLD-SCNC: 139 MMOL/L
SPECIFIC GRAVITY, URINE: 1.03
TOTAL PROTEIN: 7.5
UROBILINOGEN, URINE: NORMAL
WBC # BLD: 3.35 10^3/ML

## 2023-09-22 DIAGNOSIS — M35.9 SYSTEMIC INVOLVEMENT OF CONNECTIVE TISSUE, UNSPECIFIED (HCC): ICD-10-CM

## 2023-09-22 RX ORDER — METHOTREXATE 2.5 MG/1
TABLET ORAL
Qty: 16 TABLET | Refills: 0 | Status: SHIPPED | OUTPATIENT
Start: 2023-09-22

## 2023-09-22 NOTE — TELEPHONE ENCOUNTER
DOLV: 07/06/23  DONV: 10/10/23  LAST LAB DRAW: 09/07/23  LAST TB TEST: n/a    Lab Results   Component Value Date     09/07/2023    K 3.7 09/07/2023     09/07/2023    CO2 26 09/07/2023    BUN 14 09/07/2023    CREATININE 0.70 09/07/2023    GLUCOSE 85 09/07/2023    CALCIUM 9.2 09/07/2023    PROT 7.7 02/18/2019    LABALBU 4.0 09/07/2023    BILITOT 0.6 09/07/2023    ALKPHOS 25 09/07/2023    AST 31 09/07/2023    ALT 17 09/07/2023    LABGLOM 97 09/07/2023    AGRATIO 1.3 (L) 07/13/2018       Recent Labs     09/07/23  0000   WBC 3.35   HGB 12.1   HCT 36.9   MCV 94.4          Lab Results   Component Value Date    SEDRATE 17 09/07/2023       Lab Results   Component Value Date    CRP 6.1 03/09/2023

## 2023-10-06 ENCOUNTER — TELEMEDICINE (OUTPATIENT)
Dept: PSYCHIATRY | Age: 43
End: 2023-10-06
Payer: COMMERCIAL

## 2023-10-06 DIAGNOSIS — F41.1 GENERALIZED ANXIETY DISORDER: ICD-10-CM

## 2023-10-06 DIAGNOSIS — F34.0 CYCLOTHYMIA: Primary | ICD-10-CM

## 2023-10-06 PROCEDURE — G8484 FLU IMMUNIZE NO ADMIN: HCPCS | Performed by: NURSE PRACTITIONER

## 2023-10-06 PROCEDURE — 99214 OFFICE O/P EST MOD 30 MIN: CPT | Performed by: NURSE PRACTITIONER

## 2023-10-06 PROCEDURE — 1036F TOBACCO NON-USER: CPT | Performed by: NURSE PRACTITIONER

## 2023-10-06 PROCEDURE — G8420 CALC BMI NORM PARAMETERS: HCPCS | Performed by: NURSE PRACTITIONER

## 2023-10-06 PROCEDURE — G8427 DOCREV CUR MEDS BY ELIG CLIN: HCPCS | Performed by: NURSE PRACTITIONER

## 2023-10-06 RX ORDER — BUSPIRONE HYDROCHLORIDE 15 MG/1
15 TABLET ORAL 2 TIMES DAILY
Qty: 60 TABLET | Refills: 2 | Status: SHIPPED | OUTPATIENT
Start: 2023-10-06

## 2023-10-06 RX ORDER — HYDROXYZINE PAMOATE 25 MG/1
25 CAPSULE ORAL 3 TIMES DAILY PRN
Qty: 30 CAPSULE | Refills: 1 | Status: SHIPPED | OUTPATIENT
Start: 2023-10-06

## 2023-10-06 NOTE — PROGRESS NOTES
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 Started this position Jan. 31, 2021. 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 She had been in this position since 2014. She has turned in her resignation for this position but will be staying on part-time until the position is filled. She started her new position     MARRIAGES: two. The first marriage was in 2004 and they  after 1.5 years. Second marriage lasted until 2016. They are  after 15 years. CHILDREN: 7     SUBSTANCE USE:    1. Marijuana: medical marijuana - using 2 or 3 times per week. Tried it in high school but didn't start using the medical marijuana until Aug. 2020. Admits she has been using more recently because she has more stressors    2. Cocaine: last use was Feb 2022 or March 2022     Social Determinants of Health     Financial Resource Strain: Low Risk  (12/9/2020)    Overall Financial Resource Strain (CARDIA)     Difficulty of Paying Living Expenses: Not hard at all   Food Insecurity: No Food Insecurity (12/9/2020)    Hunger Vital Sign     Worried About Running Out of Food in the Last Year: Never true     Ran Out of Food in the Last Year: Never true   Transportation Needs: No Transportation Needs (12/9/2020)    PRAPARE - Transportation     Lack of Transportation (Medical): No     Lack of Transportation (Non-Medical):  No   Physical Activity: Not on file   Stress: Not on file   Social Connections: Not on file   Intimate Partner Violence: Not on file   Housing Stability: Not on file       FAMILY HISTORY:   Family History   Problem Relation Age of Onset    Anemia Sister     Asthma Sister     Arthritis Maternal Grandmother     Cancer Maternal Grandmother     Hearing Loss Maternal Grandmother     Miscarriages / Stillbirths Maternal Grandmother     Allergy (Severe) Maternal Grandfather     Arthritis Maternal Grandfather

## 2023-10-09 LAB — SEDIMENTATION RATE, ERYTHROCYTE: 18

## 2023-10-10 ENCOUNTER — OFFICE VISIT (OUTPATIENT)
Dept: NEPHROLOGY | Age: 43
End: 2023-10-10
Payer: COMMERCIAL

## 2023-10-10 ENCOUNTER — OFFICE VISIT (OUTPATIENT)
Dept: RHEUMATOLOGY | Age: 43
End: 2023-10-10
Payer: COMMERCIAL

## 2023-10-10 VITALS
HEART RATE: 65 BPM | HEIGHT: 61 IN | OXYGEN SATURATION: 99 % | WEIGHT: 126 LBS | SYSTOLIC BLOOD PRESSURE: 110 MMHG | BODY MASS INDEX: 23.79 KG/M2 | DIASTOLIC BLOOD PRESSURE: 62 MMHG

## 2023-10-10 VITALS
BODY MASS INDEX: 23.9 KG/M2 | WEIGHT: 126.4 LBS | SYSTOLIC BLOOD PRESSURE: 86 MMHG | OXYGEN SATURATION: 98 % | HEART RATE: 63 BPM | DIASTOLIC BLOOD PRESSURE: 54 MMHG

## 2023-10-10 DIAGNOSIS — R11.0 NAUSEA: ICD-10-CM

## 2023-10-10 DIAGNOSIS — E87.6 HYPOKALEMIA: Primary | ICD-10-CM

## 2023-10-10 DIAGNOSIS — K12.1 ORAL ULCERATION: ICD-10-CM

## 2023-10-10 DIAGNOSIS — M35.9 SYSTEMIC INVOLVEMENT OF CONNECTIVE TISSUE, UNSPECIFIED (HCC): Primary | ICD-10-CM

## 2023-10-10 DIAGNOSIS — Z51.81 MEDICATION MONITORING ENCOUNTER: ICD-10-CM

## 2023-10-10 DIAGNOSIS — M70.62 TROCHANTERIC BURSITIS OF LEFT HIP: ICD-10-CM

## 2023-10-10 PROCEDURE — G8427 DOCREV CUR MEDS BY ELIG CLIN: HCPCS | Performed by: INTERNAL MEDICINE

## 2023-10-10 PROCEDURE — G8420 CALC BMI NORM PARAMETERS: HCPCS | Performed by: INTERNAL MEDICINE

## 2023-10-10 PROCEDURE — 20610 DRAIN/INJ JOINT/BURSA W/O US: CPT | Performed by: INTERNAL MEDICINE

## 2023-10-10 PROCEDURE — 1036F TOBACCO NON-USER: CPT | Performed by: INTERNAL MEDICINE

## 2023-10-10 PROCEDURE — G8484 FLU IMMUNIZE NO ADMIN: HCPCS | Performed by: INTERNAL MEDICINE

## 2023-10-10 PROCEDURE — 99214 OFFICE O/P EST MOD 30 MIN: CPT | Performed by: INTERNAL MEDICINE

## 2023-10-10 RX ORDER — METHYLPREDNISOLONE ACETATE 40 MG/ML
40 INJECTION, SUSPENSION INTRA-ARTICULAR; INTRALESIONAL; INTRAMUSCULAR; SOFT TISSUE ONCE
Status: COMPLETED | OUTPATIENT
Start: 2023-10-10 | End: 2023-10-10

## 2023-10-10 RX ADMIN — METHYLPREDNISOLONE ACETATE 40 MG: 40 INJECTION, SUSPENSION INTRA-ARTICULAR; INTRALESIONAL; INTRAMUSCULAR; SOFT TISSUE at 09:27

## 2023-10-10 ASSESSMENT — ENCOUNTER SYMPTOMS
RESPIRATORY NEGATIVE: 1
EYES NEGATIVE: 1
GASTROINTESTINAL NEGATIVE: 1

## 2023-10-10 NOTE — PROGRESS NOTES
Administrations This Visit       methylPREDNISolone acetate (DEPO-MEDROL) injection 40 mg       Admin Date  10/10/2023 Action  Given Dose  40 mg Route  Intra-artICUlar Administered By  Starla Shelton CMA                  Given by provider in Lt Hip. PT tolerated well.     Lot # [id number]  EXP 11/1/24
cont. Daily eye drops. - Methotrexate 10mg po weekly , increase  folic acid 2 mg daily b/c nausea w/ Methotrexate - alt tx is change to injection. Nausea - Methotrexate associated. Left hip pain - localized tender outer hip -- suspected troch bursitis. - hip stretches and exercises  - helping.                - alt tx option , phys therapy, bursal injection. - busral injection today for the continued pain. 10/10/23    - declined referral to physical therapy                  Medication monitoring   - repeat labs q 4 weeks b/c Methotrexate start                - baseline plaquenil eye completed oct 2022. No follow-ups on file. New Prescriptions    No medications on file       10/10/2023    Please contact the office if you have any questions or change of symptoms. Bursa Injection Procedure Note    Date: 10/10/2023   Indications: trochanteric Bursitis - left   Anesthesia: Ethyl chloride  Procedure Details     After a discussion of the risks including infection ,bleeding and damage to tissues and benefits, mainly the relief of pain, written consent was obtained for the procedure. The site was prepped with Betadine and  Chlorhexadine. 1ml of Solumedrol 40 mg/ml mixed with 1 ml of bupivacaine was injected into the bursa. The injection site was cleansed with topical alcohol and a dressing was applied. The patient was asked to continue to rest the for a few more days before resuming regular activities. It may be more painful for the first 1-2 days. Watch for fever, or increased swelling or persistent pain in the joint. Call or return to clinic prn if such symptoms occur or there is failure to improve as anticipated. Complications:  None; patient tolerated the procedure well.

## 2023-10-21 DIAGNOSIS — M35.9 SYSTEMIC INVOLVEMENT OF CONNECTIVE TISSUE, UNSPECIFIED (HCC): ICD-10-CM

## 2023-10-23 RX ORDER — METHOTREXATE 2.5 MG/1
TABLET ORAL
Qty: 16 TABLET | Refills: 0 | Status: SHIPPED | OUTPATIENT
Start: 2023-10-23

## 2023-10-23 NOTE — TELEPHONE ENCOUNTER
DOLV: 10/10/23  DONV: 12/11/23  LAST LAB DRAW: 10/9/23  LAST TB TEST: n/a    Lab Results   Component Value Date     10/09/2023    K 3.9 10/09/2023     10/09/2023    CO2 28 10/09/2023    BUN 19 10/09/2023    CREATININE 1.00 10/09/2023    GLUCOSE 90 10/09/2023    CALCIUM 9.0 10/09/2023    PROT 7.7 02/18/2019    LABALBU 4.3 10/09/2023    BILITOT 0.4 10/09/2023    ALKPHOS 22 10/09/2023    AST 31 10/09/2023    ALT 14 10/09/2023    LABGLOM 64 10/09/2023    AGRATIO 1.3 (L) 07/13/2018       Recent Labs     10/09/23  0000   WBC 4.91   HGB 11.6*   HCT 34.6*   MCV 95.3          Lab Results   Component Value Date    SEDRATE 18 10/09/2023       Lab Results   Component Value Date    CRP 6.1 03/09/2023

## 2023-11-17 DIAGNOSIS — M35.9 SYSTEMIC INVOLVEMENT OF CONNECTIVE TISSUE, UNSPECIFIED (HCC): ICD-10-CM

## 2023-11-17 RX ORDER — METHOTREXATE 2.5 MG/1
TABLET ORAL
Qty: 16 TABLET | Refills: 0 | OUTPATIENT
Start: 2023-11-17

## 2023-11-28 LAB
ALBUMIN SERPL-MCNC: 4 G/DL
ALP BLD-CCNC: 25 U/L
ALT SERPL-CCNC: 14 U/L
ANION GAP SERPL CALCULATED.3IONS-SCNC: 11 MMOL/L
AST SERPL-CCNC: 29 U/L
BASOPHILS ABSOLUTE: ABNORMAL
BASOPHILS RELATIVE PERCENT: ABNORMAL
BILIRUB SERPL-MCNC: 0.2 MG/DL (ref 0.1–1.4)
BILIRUBIN, URINE: NEGATIVE
BLOOD, URINE: NEGATIVE
BUN BLDV-MCNC: 17 MG/DL
CALCIUM SERPL-MCNC: 8.4 MG/DL
CHLORIDE BLD-SCNC: 102 MMOL/L
CLARITY: CLEAR
CO2: 28 MMOL/L
COLOR: YELLOW
CREAT SERPL-MCNC: 0.7 MG/DL
EGFR: 97
EOSINOPHILS ABSOLUTE: ABNORMAL
EOSINOPHILS RELATIVE PERCENT: ABNORMAL
GLUCOSE BLD-MCNC: 89 MG/DL
GLUCOSE URINE: NEGATIVE
HCT VFR BLD CALC: 32.7 % (ref 36–46)
HEMOGLOBIN: 10.7 G/DL (ref 12–16)
KETONES, URINE: NEGATIVE
LEUKOCYTE ESTERASE, URINE: NEGATIVE
LYMPHOCYTES ABSOLUTE: ABNORMAL
LYMPHOCYTES RELATIVE PERCENT: ABNORMAL
MCH RBC QN AUTO: ABNORMAL PG
MCHC RBC AUTO-ENTMCNC: ABNORMAL G/DL
MCV RBC AUTO: ABNORMAL FL
MONOCYTES ABSOLUTE: ABNORMAL
MONOCYTES RELATIVE PERCENT: ABNORMAL
NEUTROPHILS ABSOLUTE: ABNORMAL
NEUTROPHILS RELATIVE PERCENT: ABNORMAL
NITRITE, URINE: NEGATIVE
PH UA: 7.5 (ref 4.5–8)
PLATELET # BLD: 213 K/ΜL
PMV BLD AUTO: ABNORMAL FL
POTASSIUM SERPL-SCNC: 3.8 MMOL/L
PROTEIN UA: NEGATIVE
RBC # BLD: 3.34 10^6/ΜL
SODIUM BLD-SCNC: 137 MMOL/L
SPECIFIC GRAVITY, URINE: 1.02
TOTAL PROTEIN: 6.8
UROBILINOGEN, URINE: NORMAL
WBC # BLD: 5.61 10^3/ML

## 2023-12-06 DIAGNOSIS — M35.9 SYSTEMIC INVOLVEMENT OF CONNECTIVE TISSUE, UNSPECIFIED (HCC): ICD-10-CM

## 2023-12-06 RX ORDER — METHOTREXATE 2.5 MG/1
TABLET ORAL
Qty: 16 TABLET | Refills: 0 | Status: SHIPPED | OUTPATIENT
Start: 2023-12-06 | End: 2023-12-11

## 2023-12-06 RX ORDER — HYDROXYCHLOROQUINE SULFATE 200 MG/1
300 TABLET, FILM COATED ORAL DAILY
Qty: 135 TABLET | Refills: 1 | Status: SHIPPED | OUTPATIENT
Start: 2023-12-06

## 2023-12-08 RX ORDER — AMILORIDE HYDROCHLORIDE 5 MG/1
5 TABLET ORAL DAILY
Qty: 90 TABLET | Refills: 3 | OUTPATIENT
Start: 2023-12-08

## 2023-12-11 ENCOUNTER — OFFICE VISIT (OUTPATIENT)
Dept: RHEUMATOLOGY | Age: 43
End: 2023-12-11
Payer: COMMERCIAL

## 2023-12-11 VITALS
HEIGHT: 61 IN | HEART RATE: 62 BPM | SYSTOLIC BLOOD PRESSURE: 92 MMHG | BODY MASS INDEX: 23.79 KG/M2 | WEIGHT: 126 LBS | OXYGEN SATURATION: 98 % | DIASTOLIC BLOOD PRESSURE: 62 MMHG

## 2023-12-11 DIAGNOSIS — M70.62 TROCHANTERIC BURSITIS OF LEFT HIP: ICD-10-CM

## 2023-12-11 DIAGNOSIS — M35.9 SYSTEMIC INVOLVEMENT OF CONNECTIVE TISSUE, UNSPECIFIED (HCC): Primary | ICD-10-CM

## 2023-12-11 DIAGNOSIS — K12.1 ORAL ULCERATION: ICD-10-CM

## 2023-12-11 DIAGNOSIS — R11.0 NAUSEA: ICD-10-CM

## 2023-12-11 DIAGNOSIS — Z51.81 MEDICATION MONITORING ENCOUNTER: ICD-10-CM

## 2023-12-11 PROCEDURE — 99214 OFFICE O/P EST MOD 30 MIN: CPT | Performed by: NURSE PRACTITIONER

## 2023-12-11 PROCEDURE — G8427 DOCREV CUR MEDS BY ELIG CLIN: HCPCS | Performed by: NURSE PRACTITIONER

## 2023-12-11 PROCEDURE — G8420 CALC BMI NORM PARAMETERS: HCPCS | Performed by: NURSE PRACTITIONER

## 2023-12-11 PROCEDURE — 1036F TOBACCO NON-USER: CPT | Performed by: NURSE PRACTITIONER

## 2023-12-11 PROCEDURE — G8484 FLU IMMUNIZE NO ADMIN: HCPCS | Performed by: NURSE PRACTITIONER

## 2023-12-11 RX ORDER — METHOTREXATE 25 MG/ML
10 INJECTION, SOLUTION INTRA-ARTERIAL; INTRAMUSCULAR; INTRAVENOUS WEEKLY
Qty: 4 EACH | Refills: 0 | Status: SHIPPED | OUTPATIENT
Start: 2023-12-11

## 2023-12-11 ASSESSMENT — ENCOUNTER SYMPTOMS
COUGH: 0
BACK PAIN: 0
NAUSEA: 0
EYE ITCHING: 0
TROUBLE SWALLOWING: 0
CONSTIPATION: 0
SHORTNESS OF BREATH: 0
ABDOMINAL PAIN: 0
EYE PAIN: 0
DIARRHEA: 0

## 2023-12-11 NOTE — PROGRESS NOTES
[facility] RHEUMATOLOGY FOLLOW UP NOTE       Date Of Service: 12/11/2023  Provider: PELON Underwood - CNP    Name: Chente Owen   MRN: [id number]    Chief Complaint(s)     Chief Complaint   Patient presents with    Follow-up     2 month f/u Systemic involvement of connective tissue, unspecified    RT shoulder, LT hip         History of Present Illness (HPI)     Chente Owen  is a(n)43 y.o. female with a hx of anemia, GERD, headaches, hypothyroidism, low back pain, mixed anxiety depressive disorder, mixed hyperlipidemia, obesity here for the f/u evaluation of undiff connective tissue disease)     Interval hx:    - recurrence of several oral sores over the past week- have resolved, however now feels one starting back up   - continued nausea with the methotrexate    pain affecting the right shoulder, left hip  Pain on a scale 0-10: 3/10  Type of pain: intermittent  Timing:none  Aggravating factors: shoulder: increased use. Hip: walking    Associated symptoms:  denies swelling/  Redness/ warmth, + AM stiffness lasting 30 mins      REVIEW OF SYSTEMS: (ROS)    Review of Systems   Constitutional:  Negative for fatigue, fever and unexpected weight change. HENT:  Negative for congestion and trouble swallowing. Oral sores  Dry mouth    Eyes:  Negative for pain and itching. Dry eyes   Respiratory:  Negative for cough and shortness of breath. Cardiovascular:  Negative for chest pain and leg swelling. Gastrointestinal:  Negative for abdominal pain, constipation, diarrhea and nausea. Endocrine: Negative for cold intolerance and heat intolerance. Genitourinary:  Negative for difficulty urinating, frequency and urgency. Musculoskeletal:  Positive for arthralgias. Negative for back pain and joint swelling. Skin:  Negative for rash. Neurological:  Negative for dizziness, weakness, numbness and headaches. Psychiatric/Behavioral:  The patient is not nervous/anxious.         PAST MEDICAL

## 2023-12-29 ENCOUNTER — TELEPHONE (OUTPATIENT)
Dept: RHEUMATOLOGY | Age: 43
End: 2023-12-29

## 2023-12-29 NOTE — TELEPHONE ENCOUNTER
Patient calling in stating her pharmacy told her that her methotrexate needs prior authorized.  Please advise.

## 2023-12-30 LAB
ALBUMIN SERPL-MCNC: 4.1 G/DL
ALP BLD-CCNC: 23 U/L
ALT SERPL-CCNC: 15 U/L
ANION GAP SERPL CALCULATED.3IONS-SCNC: 9 MMOL/L
AST SERPL-CCNC: 30 U/L
BASOPHILS ABSOLUTE: 0.03 /ΜL
BASOPHILS RELATIVE PERCENT: 0.9 %
BILIRUB SERPL-MCNC: 0.4 MG/DL (ref 0.1–1.4)
BILIRUBIN, URINE: NEGATIVE
BLOOD, URINE: POSITIVE
BUN BLDV-MCNC: 18 MG/DL
C-REACTIVE PROTEIN: 5
CALCIUM SERPL-MCNC: 9 MG/DL
CHLORIDE BLD-SCNC: 103 MMOL/L
CLARITY: CLEAR
CO2: 30 MMOL/L
COLOR: YELLOW
CREAT SERPL-MCNC: 0.8 MG/DL
EGFR: 83
EOSINOPHILS ABSOLUTE: 0.06 /ΜL
EOSINOPHILS RELATIVE PERCENT: 1.9 %
GLUCOSE BLD-MCNC: 79 MG/DL
GLUCOSE URINE: NEGATIVE
HCT VFR BLD CALC: 34.9 % (ref 36–46)
HEMOGLOBIN: 11.6 G/DL (ref 12–16)
KETONES, URINE: NEGATIVE
LEUKOCYTE ESTERASE, URINE: NEGATIVE
LYMPHOCYTES RELATIVE PERCENT: 32 %
MCH RBC QN AUTO: 32.2 PG
MCHC RBC AUTO-ENTMCNC: 33.2 G/DL
MCV RBC AUTO: 96.9 FL
MONOCYTES ABSOLUTE: 0.25 /ΜL
MONOCYTES RELATIVE PERCENT: 7.9 %
NEUTROPHILS ABSOLUTE: 1.8 /ΜL
NEUTROPHILS RELATIVE PERCENT: 57 %
NITRITE, URINE: NEGATIVE
PDW BLD-RTO: 44.9 %
PH UA: 5.5 (ref 4.5–8)
PLATELET # BLD: 234 K/ΜL
PMV BLD AUTO: 10.2 FL
POTASSIUM SERPL-SCNC: 3.7 MMOL/L
PROTEIN UA: NEGATIVE
RBC # BLD: 3.6 10^6/ΜL
SEDIMENTATION RATE, ERYTHROCYTE: 19
SODIUM BLD-SCNC: 138 MMOL/L
SPECIFIC GRAVITY, URINE: 1.03
TOTAL PROTEIN: 7
UROBILINOGEN, URINE: NORMAL
WBC # BLD: 3.16 10^3/ML

## 2023-12-31 DIAGNOSIS — M35.9 SYSTEMIC INVOLVEMENT OF CONNECTIVE TISSUE, UNSPECIFIED (HCC): ICD-10-CM

## 2024-01-03 RX ORDER — METHOTREXATE 2.5 MG/1
TABLET ORAL
Qty: 16 TABLET | Refills: 0 | OUTPATIENT
Start: 2024-01-03

## 2024-01-09 ENCOUNTER — OFFICE VISIT (OUTPATIENT)
Dept: NEPHROLOGY | Age: 44
End: 2024-01-09
Payer: COMMERCIAL

## 2024-01-09 VITALS
WEIGHT: 123 LBS | DIASTOLIC BLOOD PRESSURE: 70 MMHG | HEART RATE: 68 BPM | OXYGEN SATURATION: 98 % | SYSTOLIC BLOOD PRESSURE: 98 MMHG | BODY MASS INDEX: 23.22 KG/M2 | HEIGHT: 61 IN

## 2024-01-09 DIAGNOSIS — E87.6 HYPOKALEMIA: Primary | ICD-10-CM

## 2024-01-09 DIAGNOSIS — M35.9 SYSTEMIC INVOLVEMENT OF CONNECTIVE TISSUE, UNSPECIFIED (HCC): ICD-10-CM

## 2024-01-09 PROCEDURE — 1036F TOBACCO NON-USER: CPT | Performed by: INTERNAL MEDICINE

## 2024-01-09 PROCEDURE — G8420 CALC BMI NORM PARAMETERS: HCPCS | Performed by: INTERNAL MEDICINE

## 2024-01-09 PROCEDURE — 99213 OFFICE O/P EST LOW 20 MIN: CPT | Performed by: INTERNAL MEDICINE

## 2024-01-09 PROCEDURE — G8427 DOCREV CUR MEDS BY ELIG CLIN: HCPCS | Performed by: INTERNAL MEDICINE

## 2024-01-09 PROCEDURE — G8484 FLU IMMUNIZE NO ADMIN: HCPCS | Performed by: INTERNAL MEDICINE

## 2024-01-09 RX ORDER — FOLIC ACID 1 MG/1
1000 TABLET ORAL DAILY
Qty: 90 TABLET | Refills: 1 | Status: SHIPPED | OUTPATIENT
Start: 2024-01-09

## 2024-01-09 NOTE — PROGRESS NOTES
[facility]YS[facility] [place] [facility]  [facility] KIDNEY & HYPERTENSION  [address]  [address]  [place] [place] [facility] [place] [address]  Dept: [phone]  Loc: [phone]  Progress Note  2024 9:29 AM      Pt Name:    Kristin Zuniga  YOB: 1980  Primary Care Physician:  Christ Griffin, PELON - CNP     Chief Complaint:   Chief Complaint   Patient presents with    Follow-up     Fu 3 months for hypokalemia        History of Present Illness:   This is a follow-up visit for hypokalemia that started after her gastric sleeve surgery.     Patient following with Dr. Salas for Undifferentiated connective tissue disorder.   Is on methotrexate.   She was on amiloride for her hypokalemia which we stopped last visit due to hypotension and potassium levels are staying stable off of it.       Pertinent items are noted in HPI.         Past History:  Past Medical History:   Diagnosis Date    Anemia     Gastroesophageal reflux disease 10/19/2020    Headache     Hypothyroidism     Low back pain 10/19/2020    Mixed anxiety depressive disorder 10/19/2020    Mixed hyperlipidemia 10/19/2020    Obesity      Past Surgical History:   Procedure Laterality Date    CARPAL TUNNEL RELEASE Right      SECTION      COLON SURGERY      HYSTERECTOMY (CERVIX STATUS UNKNOWN)      SLEEVE GASTRECTOMY          VITALS:  BP 98/70 (Site: Left Upper Arm, Position: Sitting, Cuff Size: Large Adult)   Pulse 68   Ht 1.549 m (5' 1\")   Wt 55.8 kg (123 lb)   SpO2 98%   BMI 23.24 kg/m²   Wt Readings from Last 3 Encounters:   24 55.8 kg (123 lb)   23 57.2 kg (126 lb)   10/10/23 57.3 kg (126 lb 6.4 oz)     Body mass index is 23.24 kg/m².     General Appearance: alert and cooperative with exam, appears comfortable, no distress  HEENT: EOMI, moist oral mucus membranes  Neck: No jugular venous distention,   Lungs: Air entry B/L, no crackles or rales, no use of accessory muscles  Heart: S1, S2 heard, no rub  GI: soft, non-tender, no

## 2024-01-16 ENCOUNTER — TELEPHONE (OUTPATIENT)
Dept: RHEUMATOLOGY | Age: 44
End: 2024-01-16

## 2024-02-12 ENCOUNTER — PATIENT MESSAGE (OUTPATIENT)
Dept: RHEUMATOLOGY | Age: 44
End: 2024-02-12

## 2024-02-19 LAB
ALBUMIN SERPL-MCNC: 4.5 G/DL
ALP BLD-CCNC: 30 U/L
ALT SERPL-CCNC: 16 U/L
ANION GAP SERPL CALCULATED.3IONS-SCNC: 9 MMOL/L
AST SERPL-CCNC: 38 U/L
BASOPHILS ABSOLUTE: 0.04 /ΜL
BASOPHILS RELATIVE PERCENT: 0.6 %
BILIRUB SERPL-MCNC: 0.3 MG/DL (ref 0.1–1.4)
BILIRUBIN, URINE: NEGATIVE
BLOOD, URINE: POSITIVE
BUN BLDV-MCNC: 23 MG/DL
CALCIUM SERPL-MCNC: 9.2 MG/DL
CHLORIDE BLD-SCNC: 103 MMOL/L
CLARITY: CLEAR
CO2: 31 MMOL/L
COLOR: YELLOW
CREAT SERPL-MCNC: 0.8 MG/DL
EGFR: 83
EOSINOPHILS ABSOLUTE: 0.53 /ΜL
EOSINOPHILS RELATIVE PERCENT: 1.2 %
GLUCOSE BLD-MCNC: 90 MG/DL
GLUCOSE URINE: NEGATIVE
HCT VFR BLD CALC: 36.6 % (ref 36–46)
HEMOGLOBIN: 12.2 G/DL (ref 12–16)
KETONES, URINE: POSITIVE
LEUKOCYTE ESTERASE, URINE: NEGATIVE
LYMPHOCYTES ABSOLUTE: 4.68 /ΜL
LYMPHOCYTES RELATIVE PERCENT: 22.4 %
MCH RBC QN AUTO: 32.5 PG
MCHC RBC AUTO-ENTMCNC: 33.3 G/DL
MCV RBC AUTO: 97.6 FL
MONOCYTES ABSOLUTE: 1.55 /ΜL
MONOCYTES RELATIVE PERCENT: 7.7 %
NEUTROPHILS ABSOLUTE: 0.4 /ΜL
NEUTROPHILS RELATIVE PERCENT: 67.7 %
NITRITE, URINE: NEGATIVE
PDW BLD-RTO: 45 %
PH UA: 6 (ref 4.5–8)
PLATELET # BLD: 285 K/ΜL
PMV BLD AUTO: 10.5 FL
POTASSIUM SERPL-SCNC: 3.7 MMOL/L
PROTEIN UA: NEGATIVE
RBC # BLD: 3.75 10^6/ΜL
SEDIMENTATION RATE, ERYTHROCYTE: 29
SODIUM BLD-SCNC: 139 MMOL/L
SPECIFIC GRAVITY, URINE: 1.03
TOTAL PROTEIN: 7.9
UROBILINOGEN, URINE: NORMAL
WBC # BLD: 6.91 10^3/ML

## 2024-02-20 NOTE — TELEPHONE ENCOUNTER
From: JEANETTE KABA  To: Kristin Zuniga  Sent: 2/12/2024 3:53 PM EST  Subject: REFILL REQUEST    Hello, we received a refill request on your methotrexate from the pharmacy but we will need for you to have your labs drawn before we can process a refill.

## 2024-03-26 DIAGNOSIS — M35.9 SYSTEMIC INVOLVEMENT OF CONNECTIVE TISSUE, UNSPECIFIED (HCC): ICD-10-CM

## 2024-03-26 RX ORDER — AMILORIDE HYDROCHLORIDE 5 MG/1
5 TABLET ORAL DAILY
Qty: 90 TABLET | Refills: 3 | OUTPATIENT
Start: 2024-03-26

## 2024-03-26 RX ORDER — METHOTREXATE 2.5 MG/1
TABLET ORAL
Qty: 16 TABLET | Refills: 0 | OUTPATIENT
Start: 2024-03-26

## 2024-03-26 RX ORDER — METHOTREXATE 25 MG/ML
10 INJECTION, SOLUTION INTRA-ARTERIAL; INTRAMUSCULAR; INTRAVENOUS WEEKLY
Qty: 4 ML | Refills: 0 | Status: SHIPPED | OUTPATIENT
Start: 2024-03-26

## 2024-03-26 NOTE — TELEPHONE ENCOUNTER
Verified with patient she is wanting the injectable form. Patient stated she has not been taking the methotrexate because when she received her last vial, she did not receive the syringes. By the time she received her syringes, she lost the vial. Patient requesting refill on both methotrexate and syringes.     Patient is also asking if she can have methylated folic acid? She is concerned she is not absorbing the folic acid.     DOLV: 12/11/23  DONV: 4/15/24  LAST LAB DRAW: 2/19/24  LAST TB TEST: N/A    Lab Results   Component Value Date     02/19/2024    K 3.7 02/19/2024     02/19/2024    CO2 31 02/19/2024    BUN 23 02/19/2024    CREATININE 0.80 02/19/2024    GLUCOSE 90 02/19/2024    CALCIUM 9.2 02/19/2024    PROT 7.7 02/18/2019    LABALBU 4.5 02/19/2024    BILITOT 0.3 02/19/2024    ALKPHOS 30 02/19/2024    AST 38 02/19/2024    ALT 16 02/19/2024    LABGLOM 83 02/19/2024    AGRATIO 1.3 (L) 07/13/2018       No results for input(s): \"WBC\", \"HGB\", \"HCT\", \"MCV\", \"PLT\" in the last 720 hours.    Lab Results   Component Value Date    SEDRATE 29 02/19/2024       Lab Results   Component Value Date    CRP 5.0 12/30/2023

## 2024-03-26 NOTE — TELEPHONE ENCOUNTER
Patient notified. Patient stated she has been reading and just wants to make sure she is not taking something that is harmful to her. Explained to patient that folic acid is a supplement. Patient verbalized understanding.

## 2024-03-26 NOTE — TELEPHONE ENCOUNTER
The medications were refilled. I do not have the option to order methylated folic acid through the pharmacy, this can be purchased over the counter. Is there a reason she doesn't think she is absorbing it?

## 2024-04-03 LAB
ALBUMIN SERPL-MCNC: 4.7 G/DL
ALP BLD-CCNC: 28 U/L
ALT SERPL-CCNC: 17 U/L
ANION GAP SERPL CALCULATED.3IONS-SCNC: 10 MMOL/L
AST SERPL-CCNC: 33 U/L
BASOPHILS ABSOLUTE: 0.03 /ΜL
BASOPHILS RELATIVE PERCENT: 0.4 %
BILIRUB SERPL-MCNC: 0.5 MG/DL (ref 0.1–1.4)
BILIRUBIN, URINE: NEGATIVE
BLOOD, URINE: NEGATIVE
BUN BLDV-MCNC: 23 MG/DL
CALCIUM SERPL-MCNC: 9.3 MG/DL
CHLORIDE BLD-SCNC: 103 MMOL/L
CLARITY: CLEAR
CO2: 27 MMOL/L
COLOR: YELLOW
CREAT SERPL-MCNC: 0.8 MG/DL
EGFR: 83
EOSINOPHILS ABSOLUTE: 0.05 /ΜL
EOSINOPHILS RELATIVE PERCENT: 0.7 %
GLUCOSE BLD-MCNC: 101 MG/DL
GLUCOSE URINE: NEGATIVE
HCT VFR BLD CALC: 38.3 % (ref 36–46)
HEMOGLOBIN: 12.7 G/DL (ref 12–16)
KETONES, URINE: NEGATIVE
LEUKOCYTE ESTERASE, URINE: NEGATIVE
LYMPHOCYTES ABSOLUTE: 1.69 /ΜL
LYMPHOCYTES RELATIVE PERCENT: 24 %
MCH RBC QN AUTO: 32.4 PG
MCHC RBC AUTO-ENTMCNC: 33.2 G/DL
MCV RBC AUTO: 97.7 FL
MONOCYTES ABSOLUTE: 0.42 /ΜL
MONOCYTES RELATIVE PERCENT: 6 %
NEUTROPHILS ABSOLUTE: 4.83 /ΜL
NEUTROPHILS RELATIVE PERCENT: 68.8 %
NITRITE, URINE: NEGATIVE
PDW BLD-RTO: 44.5 %
PH UA: 7 (ref 4.5–8)
PLATELET # BLD: 266 K/ΜL
PMV BLD AUTO: 10 FL
POTASSIUM SERPL-SCNC: 3.5 MMOL/L
PROTEIN UA: NEGATIVE
RBC # BLD: 3.92 10^6/ΜL
SEDIMENTATION RATE, ERYTHROCYTE: 16
SODIUM BLD-SCNC: 136 MMOL/L
SPECIFIC GRAVITY, URINE: 1.02
TOTAL PROTEIN: 7.8
UROBILINOGEN, URINE: NORMAL
WBC # BLD: 7.03 10^3/ML

## 2024-04-04 LAB
C3 COMPLEMENT: 102 MG/DL (ref 90–180)
C4 COMPLEMENT: 18 MG/DL (ref 10–40)

## 2024-04-04 RX ORDER — METHOTREXATE 25 MG/ML
10 INJECTION, SOLUTION INTRA-ARTERIAL; INTRAMUSCULAR; INTRAVENOUS WEEKLY
Qty: 4 ML | Refills: 0 | Status: SHIPPED | OUTPATIENT
Start: 2024-04-04

## 2024-04-09 ENCOUNTER — TELEPHONE (OUTPATIENT)
Dept: RHEUMATOLOGY | Age: 44
End: 2024-04-09

## 2024-04-09 RX ORDER — PREDNISONE 5 MG/1
TABLET ORAL
Qty: 40 TABLET | Refills: 0 | Status: SHIPPED | OUTPATIENT
Start: 2024-04-09 | End: 2024-04-25

## 2024-04-09 NOTE — TELEPHONE ENCOUNTER
Does she have any white coating on her tongue and mouth that would be consistent with thrush, or are these her typical mouth sores?

## 2024-04-09 NOTE — TELEPHONE ENCOUNTER
Spoke with patient who stated sores in her mouth are her typical mouth sores. Please advise. Thank you.

## 2024-04-09 NOTE — TELEPHONE ENCOUNTER
Patient called stating she thinks she is in a flair.     Where is pain located?  Right hip, mouth sores  When did the pain start?  Mouth sores 6 days, pain in hip for 3 days  Rate the pain 1-10. Ten being the worst  mouth=7, hip=7  Describe the pain.  (Dull, Aching, Stabbing, radiating, etc)  hip=dull, achy, stabbing, sharp  Has this pain occurred in the past?  Mouth=yes, all the time  Hip=no, this is new  What have you used to alleviate this pain?  Tried Ibuprofen (event though she is not suppose to be taking it), ice, heat  What aggravates it?  movement  Joint swelling or redness?  No redness, possibly swelling.       Patient stated it has been difficult to eat with mouth sores. She has not slept for 2 nights d/t pain. Please advise. Thank you.

## 2024-04-23 RX ORDER — METHOTREXATE 25 MG/ML
10 INJECTION, SOLUTION INTRA-ARTERIAL; INTRAMUSCULAR; INTRAVENOUS WEEKLY
Qty: 4 ML | Refills: 0 | Status: SHIPPED | OUTPATIENT
Start: 2024-04-23

## 2024-04-23 NOTE — TELEPHONE ENCOUNTER
DOLV: 12/11/23  DONV: 5/2/24  LAST LAB DRAW: 4/3/24  LAST TB TEST: N/A    Lab Results   Component Value Date     04/03/2024    K 3.5 04/03/2024     04/03/2024    CO2 27 04/03/2024    BUN 23 04/03/2024    CREATININE 0.80 04/03/2024    GLUCOSE 101 04/03/2024    CALCIUM 9.3 04/03/2024    PROT 7.7 02/18/2019    BILITOT 0.5 04/03/2024    ALKPHOS 28 04/03/2024    AST 33 04/03/2024    ALT 17 04/03/2024    LABGLOM 83 04/03/2024    AGRATIO 1.3 (L) 07/13/2018       Recent Labs     04/03/24  1125   WBC 7.03   HGB 12.7   HCT 38.3   MCV 97.7          Lab Results   Component Value Date    SEDRATE 16 04/03/2024       Lab Results   Component Value Date    CRP 5.0 12/30/2023

## 2024-05-01 ENCOUNTER — PATIENT MESSAGE (OUTPATIENT)
Dept: RHEUMATOLOGY | Age: 44
End: 2024-05-01

## 2024-05-01 NOTE — TELEPHONE ENCOUNTER
From: Iwona VELAZQUEZ  To: Kristin Zuniga  Sent: 5/1/2024 7:41 AM EDT  Subject: Appointment Reminder    [facility]  [address] [address]  [phone]        Hello,    Our records indicate that you have an upcoming appointment at [facility]. Please log into your [employer] and complete the visit pre-check section prior to your appointment. If you need any assistance with this process, please call our office at [phone].  This process can also be completed through your [employer]/Davi.     Respectfully,  [facility] staff       WHAT IS VISIT PRE-CHECK?  Visit Pre-Check saves time by letting you complete your appointment paperwork and pay your copay in advance of your appointment.  1.After signing in to [employer]/ [employer] davi, open My Appointments  2.Within 3 days of your scheduled appointment, the Visit Pre-Check button will appear  3.Select Visit Pre-Check  4.Verify or update your personal information  Demographics and address  Known allergies  Current medications, including preferred pharmacy  List of current health issues  Insurance coverage, including a photo of your insurance card  5.Sign your electronic consents such as Notice of Privacy Practices, Consent to Treat, Financial and Information Sharing  6.Pay your copay and outstanding balances, if eligible  7.Verify or update your patient health information  8.Submit and you are all ready for your appointment

## 2024-06-07 ENCOUNTER — TELEMEDICINE (OUTPATIENT)
Dept: PSYCHIATRY | Age: 44
End: 2024-06-07
Payer: COMMERCIAL

## 2024-06-07 DIAGNOSIS — F34.0 CYCLOTHYMIA: Primary | ICD-10-CM

## 2024-06-07 DIAGNOSIS — F41.1 GENERALIZED ANXIETY DISORDER: ICD-10-CM

## 2024-06-07 DIAGNOSIS — F60.9 PERSONALITY DISORDER (HCC): ICD-10-CM

## 2024-06-07 PROCEDURE — G8420 CALC BMI NORM PARAMETERS: HCPCS | Performed by: NURSE PRACTITIONER

## 2024-06-07 PROCEDURE — 1036F TOBACCO NON-USER: CPT | Performed by: NURSE PRACTITIONER

## 2024-06-07 PROCEDURE — G8427 DOCREV CUR MEDS BY ELIG CLIN: HCPCS | Performed by: NURSE PRACTITIONER

## 2024-06-07 PROCEDURE — 90833 PSYTX W PT W E/M 30 MIN: CPT | Performed by: NURSE PRACTITIONER

## 2024-06-07 PROCEDURE — 99214 OFFICE O/P EST MOD 30 MIN: CPT | Performed by: NURSE PRACTITIONER

## 2024-06-07 RX ORDER — HYDROXYZINE PAMOATE 25 MG/1
25 CAPSULE ORAL 3 TIMES DAILY PRN
Qty: 30 CAPSULE | Refills: 1 | Status: SHIPPED | OUTPATIENT
Start: 2024-06-07

## 2024-06-07 RX ORDER — BUSPIRONE HYDROCHLORIDE 30 MG/1
30 TABLET ORAL 2 TIMES DAILY
Qty: 60 TABLET | Refills: 1 | Status: SHIPPED | OUTPATIENT
Start: 2024-06-07

## 2024-06-07 NOTE — PROGRESS NOTES
[facility] PROFESSIONAL [facility] PSYCHIATRY  [address]  [facility] [place] [address]  Dept: [phone]  Dept Fax: [phone]  Loc: [phone]    Visit Date: 6/7/2024    Kristin Zuniga, was evaluated through a synchronous (real-time) audio-video encounter. The patient (or guardian if applicable) is aware that this is a billable service, which includes applicable co-pays. This Virtual Visit was conducted with patient's (and/or legal guardian's) consent. Patient identification was verified, and a caregiver was present when appropriate.   The patient was located at Home: [address] [place] [id number]  Provider was located at Home (Appt Dept State): [place]       SUBJECTIVE DATA     CHIEF COMPLAINT:    Chief Complaint   Patient presents with    Mental Health Problem    Follow-up       History obtained from: patient    HISTORY OF PRESENT ILLNESS:    Kristin Zuniga is a 44 y.o. female who presents via virtual video visit for follow-up on complaints of depression and anxiety. Last visit was 10/06/2023.    States she is \"going crazy.\" States she feels like she is \"losing my mind\"    Some significant issues at work has occurred since her last visit  -she wasn't directly involved but it was something involving her boss and some folks who were terminated from her place of employment  -she was subpoenaed to testify in court in Feb. 2024 in a civil service trial from the former employees  -there has now been some conflict with her boss since patient testified     Has been having stressors at work as her boss has been withholding information for patient to be able to do     May 17, 2024 she was placed on paid administrative leave but has not given her a reason for the paid leave  -she has reached out to a     States she has been feeling anxious  -having racing thoughts  -having intrusive thoughts  -having nausea  -decreased appetite  -having difficulty controlling the racing

## 2024-06-10 RX ORDER — METHOTREXATE 25 MG/ML
10 INJECTION, SOLUTION INTRA-ARTERIAL; INTRAMUSCULAR; INTRAVENOUS WEEKLY
Qty: 12 ML | Refills: 1 | OUTPATIENT
Start: 2024-06-10

## 2024-06-11 DIAGNOSIS — M35.9 SYSTEMIC INVOLVEMENT OF CONNECTIVE TISSUE, UNSPECIFIED (HCC): Primary | ICD-10-CM

## 2024-06-11 LAB
ALBUMIN SERPL-MCNC: 4.6 G/DL
ALP BLD-CCNC: 22 U/L
ALT SERPL-CCNC: 17 U/L
ANION GAP SERPL CALCULATED.3IONS-SCNC: 1.5 MMOL/L
AST SERPL-CCNC: 30 U/L
BASOPHILS ABSOLUTE: 0.03 /ΜL
BASOPHILS RELATIVE PERCENT: 0.8 %
BILIRUB SERPL-MCNC: 0.6 MG/DL (ref 0.1–1.4)
BUN BLDV-MCNC: 21 MG/DL
C3 COMPLEMENT: 108 MG/DL (ref 90–180)
C4 COMPLEMENT: 20 MG/DL (ref 10–40)
CALCIUM SERPL-MCNC: 9.4 MG/DL
CHLORIDE BLD-SCNC: 107 MMOL/L
CO2: 29 MMOL/L
CREAT SERPL-MCNC: 0.8 MG/DL
EGFR: 83
EOSINOPHILS ABSOLUTE: 0.04 /ΜL
EOSINOPHILS RELATIVE PERCENT: 1.1 %
GLUCOSE BLD-MCNC: 91 MG/DL
HCT VFR BLD CALC: 38.8 % (ref 36–46)
HEMOGLOBIN: 13 G/DL (ref 12–16)
LYMPHOCYTES ABSOLUTE: 1.01 /ΜL
LYMPHOCYTES RELATIVE PERCENT: 28.3 %
MCH RBC QN AUTO: 32.4 PG
MCHC RBC AUTO-ENTMCNC: 33.5 G/DL
MCV RBC AUTO: 96.8 FL
MONOCYTES ABSOLUTE: 0.27 /ΜL
MONOCYTES RELATIVE PERCENT: 7.6 %
NEUTROPHILS ABSOLUTE: 2.21 /ΜL
NEUTROPHILS RELATIVE PERCENT: 61.9 %
PDW BLD-RTO: 43.6 %
PLATELET # BLD: 225 K/ΜL
PMV BLD AUTO: 10.5 FL
POTASSIUM SERPL-SCNC: 3.8 MMOL/L
RBC # BLD: 4.01 10^6/ΜL
SEDIMENTATION RATE, ERYTHROCYTE: 17
SODIUM BLD-SCNC: 141 MMOL/L
TOTAL PROTEIN: 7.8
WBC # BLD: 3.57 10^3/ML

## 2024-06-11 RX ORDER — METHOTREXATE 25 MG/ML
10 INJECTION, SOLUTION INTRA-ARTERIAL; INTRAMUSCULAR; INTRAVENOUS WEEKLY
Qty: 4 ML | Refills: 0 | Status: SHIPPED | OUTPATIENT
Start: 2024-06-11 | End: 2024-06-13 | Stop reason: SDUPTHER

## 2024-06-13 ENCOUNTER — OFFICE VISIT (OUTPATIENT)
Dept: RHEUMATOLOGY | Age: 44
End: 2024-06-13
Payer: COMMERCIAL

## 2024-06-13 VITALS
SYSTOLIC BLOOD PRESSURE: 106 MMHG | HEIGHT: 61 IN | BODY MASS INDEX: 23.71 KG/M2 | DIASTOLIC BLOOD PRESSURE: 60 MMHG | OXYGEN SATURATION: 98 % | WEIGHT: 125.6 LBS | HEART RATE: 83 BPM

## 2024-06-13 DIAGNOSIS — K12.1 ORAL ULCERATION: ICD-10-CM

## 2024-06-13 DIAGNOSIS — M35.9 UNDIFFERENTIATED CONNECTIVE TISSUE DISEASE (HCC): Primary | ICD-10-CM

## 2024-06-13 DIAGNOSIS — M54.50 CHRONIC MIDLINE LOW BACK PAIN WITHOUT SCIATICA: ICD-10-CM

## 2024-06-13 DIAGNOSIS — M70.62 TROCHANTERIC BURSITIS OF LEFT HIP: ICD-10-CM

## 2024-06-13 DIAGNOSIS — Z51.81 MEDICATION MONITORING ENCOUNTER: ICD-10-CM

## 2024-06-13 DIAGNOSIS — R76.8 SS-A ANTIBODY POSITIVE: ICD-10-CM

## 2024-06-13 DIAGNOSIS — G89.29 CHRONIC MIDLINE LOW BACK PAIN WITHOUT SCIATICA: ICD-10-CM

## 2024-06-13 PROCEDURE — 99214 OFFICE O/P EST MOD 30 MIN: CPT | Performed by: NURSE PRACTITIONER

## 2024-06-13 PROCEDURE — G8427 DOCREV CUR MEDS BY ELIG CLIN: HCPCS | Performed by: NURSE PRACTITIONER

## 2024-06-13 PROCEDURE — 1036F TOBACCO NON-USER: CPT | Performed by: NURSE PRACTITIONER

## 2024-06-13 PROCEDURE — G8420 CALC BMI NORM PARAMETERS: HCPCS | Performed by: NURSE PRACTITIONER

## 2024-06-13 RX ORDER — METHOTREXATE 25 MG/ML
15 INJECTION, SOLUTION INTRA-ARTERIAL; INTRAMUSCULAR; INTRAVENOUS WEEKLY
Qty: 4 ML | Refills: 0 | Status: SHIPPED | OUTPATIENT
Start: 2024-06-13

## 2024-06-13 RX ORDER — FOLIC ACID 1 MG/1
1000 TABLET ORAL DAILY
Qty: 90 TABLET | Refills: 1 | Status: SHIPPED | OUTPATIENT
Start: 2024-06-13

## 2024-06-13 ASSESSMENT — ENCOUNTER SYMPTOMS
COUGH: 0
SHORTNESS OF BREATH: 0
TROUBLE SWALLOWING: 0
ABDOMINAL PAIN: 0
CONSTIPATION: 0
EYE ITCHING: 0
EYE PAIN: 0
NAUSEA: 0
BACK PAIN: 0
DIARRHEA: 0

## 2024-06-13 NOTE — PROGRESS NOTES
photosensitivity rash, mother w/ SLE, crohn's, RA. MGF w/ gout  - prior tx: methotrexate PO (nausea)    - plaquenil 300 mg daily (8/2022)   - increase methotrexate to 15 mg subcu weekly due to cont oral sores & start folic acid (previously not taking) due to nausea. Pt to notify office if nausea worsens     Left hip pain- suspected troch bursitis  - prior tx: bursal injection (some relief)   - continue with stretching and exercises    Medication monitoring   - CBC, CMP, sed rate, CRP q 4 weeks x 3    - plaquenil eye exam (10/2022)      No follow-ups on file.        Electronically signed by PELON Prieto CNP on 6/13/2024 at 9:05 AM    New Prescriptions    No medications on file       Thank you for allowing me to participate in the care of this patient.   Please call if there are any questions.

## 2024-07-01 RX ORDER — BUSPIRONE HYDROCHLORIDE 30 MG/1
30 TABLET ORAL 2 TIMES DAILY
Qty: 180 TABLET | Refills: 1 | OUTPATIENT
Start: 2024-07-01

## 2024-07-08 ENCOUNTER — TELEMEDICINE (OUTPATIENT)
Dept: PSYCHIATRY | Age: 44
End: 2024-07-08
Payer: COMMERCIAL

## 2024-07-08 DIAGNOSIS — F60.9 PERSONALITY DISORDER (HCC): ICD-10-CM

## 2024-07-08 DIAGNOSIS — F34.0 CYCLOTHYMIA: Primary | ICD-10-CM

## 2024-07-08 DIAGNOSIS — F41.1 GENERALIZED ANXIETY DISORDER: ICD-10-CM

## 2024-07-08 DIAGNOSIS — F43.0 ACUTE REACTION TO SITUATIONAL STRESS: ICD-10-CM

## 2024-07-08 PROCEDURE — 99214 OFFICE O/P EST MOD 30 MIN: CPT | Performed by: NURSE PRACTITIONER

## 2024-07-08 PROCEDURE — G8420 CALC BMI NORM PARAMETERS: HCPCS | Performed by: NURSE PRACTITIONER

## 2024-07-08 PROCEDURE — 90833 PSYTX W PT W E/M 30 MIN: CPT | Performed by: NURSE PRACTITIONER

## 2024-07-08 PROCEDURE — 1036F TOBACCO NON-USER: CPT | Performed by: NURSE PRACTITIONER

## 2024-07-08 PROCEDURE — G8427 DOCREV CUR MEDS BY ELIG CLIN: HCPCS | Performed by: NURSE PRACTITIONER

## 2024-07-08 RX ORDER — BUSPIRONE HYDROCHLORIDE 30 MG/1
30 TABLET ORAL 2 TIMES DAILY
Qty: 60 TABLET | Refills: 0 | Status: SHIPPED | OUTPATIENT
Start: 2024-07-08

## 2024-07-08 RX ORDER — HYDROXYZINE PAMOATE 25 MG/1
25 CAPSULE ORAL 3 TIMES DAILY PRN
Qty: 30 CAPSULE | Refills: 0 | Status: SHIPPED | OUTPATIENT
Start: 2024-07-08

## 2024-07-08 NOTE — PROGRESS NOTES
[facility] PROFESSIONAL [facility] PSYCHIATRY  [address]  [place] [address]  Dept: [phone]  Dept Fax: [phone]  Loc: [phone]    Visit Date: 7/8/2024    Kristin Zuniga, was evaluated through a synchronous (real-time) audio-video encounter. The patient (or guardian if applicable) is aware that this is a billable service, which includes applicable co-pays. This Virtual Visit was conducted with patient's (and/or legal guardian's) consent. Patient identification was verified, and a caregiver was present when appropriate.   The patient was located at Other: in her parked car in a parking lot in [place]  Provider was located at Home (Appt Dept State): [place]      SUBJECTIVE DATA     CHIEF COMPLAINT:    Chief Complaint   Patient presents with    Mental Health Problem    Follow-up       History obtained from: patient    HISTORY OF PRESENT ILLNESS:    Kristin Zuniga is a 44 y.o. female who presents via virtual video visit for follow-up on complaints of depression and anxiety. Last visit was 06/07/2024.    States \"I'm okay\"  -states she is doing better  -she watched a lot of videos on how to calm her anxiety and attachments and redirecting thoughts    Spent 3 days out of town, which she really enjoyed     She increased the BuSpar as directed at last visit.   -this has been helpful    She tried the Vraylar but only for 1 week  -she did not like how it made her feel; states it made her feel \"weird\"     She has a hearing next week for work  -she is anticipating getting terminated which will then cause her to loose her insurance  -this is a \"work hearing\"  -states she states she is being terminated for her testimony  -she has a  regarding   -states she is already \"prepared for the worst\"    States she does not like not working  -feels too \"slow\"  -states \"I'm not cut out for this\"  -states she puts a lot of value and her self-worth in her work    Things at home are \"a bit of a

## 2024-07-12 LAB
ALBUMIN: 4.9 G/DL
ALP BLD-CCNC: 24 U/L
ALT SERPL-CCNC: 21 U/L
ANION GAP SERPL CALCULATED.3IONS-SCNC: 12 MMOL/L
AST SERPL-CCNC: 37 U/L
BASOPHILS ABSOLUTE: 0.04 /ΜL
BASOPHILS RELATIVE PERCENT: 0.6 %
BILIRUB SERPL-MCNC: 0.6 MG/DL (ref 0.1–1.4)
BILIRUBIN, URINE: NEGATIVE
BLOOD, URINE: POSITIVE
BUN BLDV-MCNC: 25 MG/DL
C3 COMPLEMENT: 112 MG/DL (ref 90–180)
C4 COMPLEMENT: 19 MG/DL (ref 10–40)
CALCIUM SERPL-MCNC: 10.1 MG/DL
CHLORIDE BLD-SCNC: 104 MMOL/L
CLARITY, UA: CLEAR
CO2: 29 MMOL/L
COLOR, UA: YELLOW
CREAT SERPL-MCNC: 0.9 MG/DL
EOSINOPHILS ABSOLUTE: 0.07 /ΜL
EOSINOPHILS RELATIVE PERCENT: 1.1 %
GFR, ESTIMATED: 72
GLUCOSE BLD-MCNC: 92 MG/DL
GLUCOSE URINE: NEGATIVE
HCT VFR BLD CALC: 38.7 % (ref 36–46)
HEMOGLOBIN: 12.8 G/DL (ref 12–16)
KETONES, URINE: NEGATIVE
LEUKOCYTE ESTERASE, URINE: NEGATIVE
LYMPHOCYTES ABSOLUTE: 1.33 /ΜL
LYMPHOCYTES RELATIVE PERCENT: 21.2 %
MCH RBC QN AUTO: 32.2 PG
MCHC RBC AUTO-ENTMCNC: 33.1 G/DL
MCV RBC AUTO: 97.2 FL
MONOCYTES ABSOLUTE: 0.47 /ΜL
MONOCYTES RELATIVE PERCENT: 7.5 %
NEUTROPHILS ABSOLUTE: 4.32 /ΜL
NEUTROPHILS RELATIVE PERCENT: 69 %
NITRITE, URINE: NEGATIVE
PDW BLD-RTO: 45.7 %
PH UA: 6 (ref 4.5–8)
PLATELET # BLD: 269 K/ΜL
PMV BLD AUTO: 10.3 FL
POTASSIUM SERPL-SCNC: 4 MMOL/L
PROTEIN UA: NEGATIVE
RBC # BLD: 3.98 10^6/ΜL
SED RATE, AUTOMATED: 26
SODIUM BLD-SCNC: 141 MMOL/L
SPECIFIC GRAVITY UA: 1.02 (ref 1–1.03)
TOTAL PROTEIN: 8.3 G/DL (ref 6.4–8.2)
UROBILINOGEN, URINE: NORMAL
WBC # BLD: 6.27 10^3/ML

## 2024-07-15 DIAGNOSIS — Z51.81 MEDICATION MONITORING ENCOUNTER: ICD-10-CM

## 2024-07-15 DIAGNOSIS — M35.9 UNDIFFERENTIATED CONNECTIVE TISSUE DISEASE (HCC): Primary | ICD-10-CM

## 2024-07-19 DIAGNOSIS — M35.9 SYSTEMIC INVOLVEMENT OF CONNECTIVE TISSUE, UNSPECIFIED (HCC): ICD-10-CM

## 2024-07-19 RX ORDER — HYDROXYCHLOROQUINE SULFATE 200 MG/1
300 TABLET, FILM COATED ORAL DAILY
Qty: 135 TABLET | Refills: 1 | Status: SHIPPED | OUTPATIENT
Start: 2024-07-19

## 2024-07-19 RX ORDER — METHOTREXATE 25 MG/ML
15 INJECTION, SOLUTION INTRA-ARTERIAL; INTRAMUSCULAR; INTRAVENOUS WEEKLY
Qty: 4 ML | Refills: 0 | Status: SHIPPED | OUTPATIENT
Start: 2024-07-19

## 2024-07-19 NOTE — TELEPHONE ENCOUNTER
Kristin Zuniga called requesting a refill on the following medications:  Requested Prescriptions     Pending Prescriptions Disp Refills    methotrexate Sodium 50 MG/2ML chemo injection [Pharmacy Med Name: METHOTREXATE 50 MG/2 ML VIAL] 4 mL 0     Sig: INJECT 0.6 MLS INTO THE SKIN ONCE A WEEK    hydroxychloroquine (PLAQUENIL) 200 MG tablet 135 tablet 1     Sig: Take 1.5 tablets by mouth daily     Pharmacy verified:[facility]   .pv      Date of last visit: 6/13/24   Date of next visit (if applicable): 9/17/2024            The Pt is requesting a 3 month supply. She is losing her Ins Coverage at the end of   Ins Coverage                  Occupational Therapy  Daily Treatment     Patient Name: Magali Leal  Age:  56 y.o., Sex:  female  Medical Record #: [id number]  Today's Date: 3/18/2022     Precautions  Precautions: Fall Risk,Non Weight Bearing Left Lower Extremity  Comments: CAM boot on LLE, ALEIDA drain, abdominal precautions, and 2 foleys; fair adherence to NWB LLE    Assessment    Pt seen for OT session. Progressing with activity tolerance, txfs, BUE strength, and motivation. Continues to req max v/cs for NWB LLE, but unable to maintain during stand pivot txf. Spent time educating pt on abdominal binder wear/don/doff for comfort, CAM boot don/doff, timing OOB activities with pain meds, and importance of continued OOB activity; getting up to chair 3x/day for meals and to BSC for toileting. Pt reports wants to go home at w/c level with wife and friend's assist. Educated on DME, ADL/txfs, and home safety. Continues to be limited by decreased functional mobility, activity tolerance, cognition, strength, AROM, balance, adherence to precautions, and pain which are currently affecting pt's ability to complete ADLs/IADLs at baseline. Will continue to follow.     Plan    Continue current treatment plan.    DC Equipment Recommendations: Unable to determine at this time  Discharge Recommendations: Recommend post-acute placement for additional occupational therapy services prior to discharge home (if insists on returning home at [facility], recommend BSC, shower seat, w/c, and likely will need 24/7 assist.)     Objective     03/18/22 0920   Precautions   Precautions Fall Risk;Non Weight Bearing Left Lower Extremity   Comments CAM boot on LLE, ALEIDA drain, abdominal precautions, and 2 foleys; fair adherence to NWB LLE   Vitals   O2 Delivery Device None - Room Air   Pain 0 - 10 Group   Location Abdomen   Therapist Pain Assessment Post Activity;During Activity;Nurse Notified  (not quantified)   Cognition    Cognition / Consciousness X   Level of Consciousness Alert    Safety Awareness Impaired   New Learning Impaired   Attention Impaired   Comments pleasant and cooperative; more motivated this session. anxiousness with mobility d/t fear of falling   Passive ROM Upper Body   Passive ROM Upper Body WDL   Active ROM Upper Body   Active ROM Upper Body  WDL   Strength Upper Body   Upper Body Strength  X   Gross Strength Generalized Weakness, Equal Bilaterally.    Comments improving, no c/o pain   Other Treatments   Other Treatments Provided Spent time educating pt on abdominal binder wear/don/doff for comfort, CAM boot don/doff, and importance of continued OOB activity and getting up to chair 3x/day for meals. Pt reports wants to go home at w/c level with wife and friend's assist. Educated on BSC and ADL txfs and safety.   Balance   Sitting Balance (Static) Fair +   Sitting Balance (Dynamic) Fair   Standing Balance (Static) Poor +   Standing Balance (Dynamic) Poor   Weight Shift Sitting Fair   Weight Shift Standing Absent   Skilled Intervention Tactile Cuing;Verbal Cuing;Sequencing;Postural Facilitation;Compensatory Strategies;Facilitation   Comments w/ FWW during txf; req facilitation of FWW and max v/cs for adherence to NWB LLE   Bed Mobility    Supine to Sit Minimal Assist   Sit to Supine Minimal Assist  (LLE)   Scooting Supervised   Skilled Intervention Verbal Cuing;Tactile Cuing;Compensatory Strategies;Facilitation   Comments HOB elevated   Activities of Daily Living   Eating Supervision  (drinking)   Grooming Supervision;Seated  (oral care and wiping face)   Upper Body Dressing Moderate Assist  (max A don abdominal binder; min A to doff)   Lower Body Dressing Moderate Assist  (min A for shoe, max A for CAM boot. Improved ability to get to feet)   Toileting Moderate Assist  (garza; new lavonne blood in BSC and both foleys: RN aware)   Skilled Intervention Verbal Cuing;Tactile Cuing;Facilitation;Compensatory Strategies;Sequencing   Comments limited by fatigue and fear   Functional  Mobility   Sit to Stand Moderate Assist  (Min A on #2 stand)   Bed, Chair, Wheelchair Transfer Minimal Assist   Toilet Transfers Moderate Assist  (sherley BSC)   Transfer Method Stand Pivot   Mobility w/FWW; req max v/cs for NWB LLE- unable to adhere, pt reports d/t fear of falling   Skilled Intervention Verbal Cuing;Tactile Cuing;Sequencing;Facilitation;Compensatory Strategies   Activity Tolerance   Comments limited by fatigue; 20 min on BSC   Edema Management   Other Edema Mgmt Treatments BUE edema improving   Patient / Family Goals   Patient / Family Goal #1 to go home   Short Term Goals   Short Term Goal # 1 Pt will complete ADL transfers with supervision   Goal Outcome # 1 Progressing slower than expected   Short Term Goal # 2 Pt will complete LB dressing with supervision   Goal Outcome # 2 Progressing as expected   Short Term Goal # 3 Pt will complete toileting with supervision   Goal Outcome # 3 Progressing as expected   Short Term Goal # 4 will eat sitting EOB 3x/day w/SPV   Goal Outcome # 4 Goal not met   Education Group   Education Provided Pathology of bedrest;Weight Bearing Precautions;Transfers;Activities of Daily Living   Transfers Patient Response Patient;Acceptance;Explanation;Demonstration;Action Demonstration;Reinforcement Needed   ADL Patient Response Patient;Acceptance;Explanation;Reinforcement Needed;Action Demonstration;Demonstration   Weight Bearing Precautions Patient Response Patient;Acceptance;Explanation;Demonstration;Reinforcement Needed;No Learning Evidence   Pathology of Bedrest Patient Response Patient;Acceptance;Explanation;Verbal Demonstration;Reinforcement Needed

## 2024-07-19 NOTE — TELEPHONE ENCOUNTER
DOLV: 6/13/24  DONV: 9/17/24  LAST LAB DRAW: 7/12/24  LAST TB TEST: N/A    Lab Results   Component Value Date     07/12/2024    K 4.0 07/12/2024     07/12/2024    CO2 29 07/12/2024    BUN 25 07/12/2024    CREATININE 0.90 07/12/2024    GLUCOSE 92 07/12/2024    CALCIUM 10.1 07/12/2024    BILITOT 0.6 07/12/2024    ALKPHOS 24 07/12/2024    AST 37 07/12/2024    ALT 21 07/12/2024    LABGLOM 72 07/12/2024    AGRATIO 1.3 (L) 07/13/2018       Recent Labs     07/12/24  1524   WBC 6.27   HGB 12.8   HCT 38.7   MCV 97.2          Lab Results   Component Value Date    SEDRATE 26 07/12/2024       Lab Results   Component Value Date    CRP 5.0 12/30/2023

## 2024-09-09 ENCOUNTER — TELEMEDICINE (OUTPATIENT)
Dept: PSYCHIATRY | Age: 44
End: 2024-09-09
Payer: COMMERCIAL

## 2024-09-09 DIAGNOSIS — F43.0 ACUTE REACTION TO SITUATIONAL STRESS: ICD-10-CM

## 2024-09-09 DIAGNOSIS — F34.0 CYCLOTHYMIA: Primary | ICD-10-CM

## 2024-09-09 DIAGNOSIS — F41.1 GENERALIZED ANXIETY DISORDER: ICD-10-CM

## 2024-09-09 DIAGNOSIS — F60.9 PERSONALITY DISORDER (HCC): ICD-10-CM

## 2024-09-09 PROCEDURE — G8420 CALC BMI NORM PARAMETERS: HCPCS | Performed by: NURSE PRACTITIONER

## 2024-09-09 PROCEDURE — G8427 DOCREV CUR MEDS BY ELIG CLIN: HCPCS | Performed by: NURSE PRACTITIONER

## 2024-09-09 PROCEDURE — 99214 OFFICE O/P EST MOD 30 MIN: CPT | Performed by: NURSE PRACTITIONER

## 2024-09-09 PROCEDURE — 1036F TOBACCO NON-USER: CPT | Performed by: NURSE PRACTITIONER

## 2024-09-09 PROCEDURE — 90836 PSYTX W PT W E/M 45 MIN: CPT | Performed by: NURSE PRACTITIONER

## 2024-09-09 RX ORDER — LAMOTRIGINE 25 MG/1
TABLET ORAL
Qty: 42 TABLET | Refills: 0 | Status: SHIPPED | OUTPATIENT
Start: 2024-09-09

## 2024-09-09 RX ORDER — ESCITALOPRAM OXALATE 10 MG/1
10 TABLET ORAL DAILY
COMMUNITY

## 2024-09-26 RX ORDER — LAMOTRIGINE 25 MG/1
TABLET ORAL
Qty: 126 TABLET | Refills: 1 | OUTPATIENT
Start: 2024-09-26

## 2024-10-04 RX ORDER — LAMOTRIGINE 25 MG/1
50 TABLET ORAL DAILY
Qty: 60 TABLET | Refills: 0 | Status: SHIPPED | OUTPATIENT
Start: 2024-10-04

## 2024-10-04 NOTE — TELEPHONE ENCOUNTER
[facility] is requesting a medication refill on Kristin's behalf for Lamictal 25mg; titration dosing; was sent on 09/09/24 for $42 tablets.    Medication is pending your approval for the Lamictal 25mg;#60 with 0 refills; BID dosing. Please advise otherwise. She is scheduled to return on 10/16/24. Last seen on 09/09/24.

## 2024-10-16 ENCOUNTER — TELEMEDICINE (OUTPATIENT)
Dept: PSYCHIATRY | Age: 44
End: 2024-10-16
Payer: COMMERCIAL

## 2024-10-16 DIAGNOSIS — F34.0 CYCLOTHYMIA: Primary | ICD-10-CM

## 2024-10-16 DIAGNOSIS — F60.9 PERSONALITY DISORDER (HCC): ICD-10-CM

## 2024-10-16 DIAGNOSIS — F41.1 GENERALIZED ANXIETY DISORDER: ICD-10-CM

## 2024-10-16 PROCEDURE — 90836 PSYTX W PT W E/M 45 MIN: CPT | Performed by: NURSE PRACTITIONER

## 2024-10-16 PROCEDURE — 99214 OFFICE O/P EST MOD 30 MIN: CPT | Performed by: NURSE PRACTITIONER

## 2024-10-16 PROCEDURE — 1036F TOBACCO NON-USER: CPT | Performed by: NURSE PRACTITIONER

## 2024-10-16 PROCEDURE — G8427 DOCREV CUR MEDS BY ELIG CLIN: HCPCS | Performed by: NURSE PRACTITIONER

## 2024-10-16 PROCEDURE — G8484 FLU IMMUNIZE NO ADMIN: HCPCS | Performed by: NURSE PRACTITIONER

## 2024-10-16 PROCEDURE — G8420 CALC BMI NORM PARAMETERS: HCPCS | Performed by: NURSE PRACTITIONER

## 2024-10-16 RX ORDER — ESCITALOPRAM OXALATE 10 MG/1
10 TABLET ORAL DAILY
Qty: 30 TABLET | Refills: 2 | Status: SHIPPED | OUTPATIENT
Start: 2024-10-16

## 2024-10-16 RX ORDER — LAMOTRIGINE 25 MG/1
50 TABLET ORAL DAILY
Qty: 60 TABLET | Refills: 2 | Status: SHIPPED | OUTPATIENT
Start: 2024-10-16

## 2024-10-16 ASSESSMENT — ANXIETY QUESTIONNAIRES
5. BEING SO RESTLESS THAT IT IS HARD TO SIT STILL: NOT AT ALL
6. BECOMING EASILY ANNOYED OR IRRITABLE: NOT AT ALL
3. WORRYING TOO MUCH ABOUT DIFFERENT THINGS: SEVERAL DAYS
5. BEING SO RESTLESS THAT IT IS HARD TO SIT STILL: NOT AT ALL
2. NOT BEING ABLE TO STOP OR CONTROL WORRYING: SEVERAL DAYS
6. BECOMING EASILY ANNOYED OR IRRITABLE: NOT AT ALL
IF YOU CHECKED OFF ANY PROBLEMS ON THIS QUESTIONNAIRE, HOW DIFFICULT HAVE THESE PROBLEMS MADE IT FOR YOU TO DO YOUR WORK, TAKE CARE OF THINGS AT HOME, OR GET ALONG WITH OTHER PEOPLE: SOMEWHAT DIFFICULT
2. NOT BEING ABLE TO STOP OR CONTROL WORRYING: SEVERAL DAYS
1. FEELING NERVOUS, ANXIOUS, OR ON EDGE: SEVERAL DAYS
3. WORRYING TOO MUCH ABOUT DIFFERENT THINGS: SEVERAL DAYS
IF YOU CHECKED OFF ANY PROBLEMS ON THIS QUESTIONNAIRE, HOW DIFFICULT HAVE THESE PROBLEMS MADE IT FOR YOU TO DO YOUR WORK, TAKE CARE OF THINGS AT HOME, OR GET ALONG WITH OTHER PEOPLE: SOMEWHAT DIFFICULT
1. FEELING NERVOUS, ANXIOUS, OR ON EDGE: SEVERAL DAYS
7. FEELING AFRAID AS IF SOMETHING AWFUL MIGHT HAPPEN: NOT AT ALL
7. FEELING AFRAID AS IF SOMETHING AWFUL MIGHT HAPPEN: NOT AT ALL
4. TROUBLE RELAXING: SEVERAL DAYS
4. TROUBLE RELAXING: SEVERAL DAYS
GAD7 TOTAL SCORE: 4

## 2024-10-16 ASSESSMENT — PATIENT HEALTH QUESTIONNAIRE - PHQ9
SUM OF ALL RESPONSES TO PHQ QUESTIONS 1-9: 2
2. FEELING DOWN, DEPRESSED OR HOPELESS: SEVERAL DAYS
SUM OF ALL RESPONSES TO PHQ QUESTIONS 1-9: 2
1. LITTLE INTEREST OR PLEASURE IN DOING THINGS: SEVERAL DAYS
SUM OF ALL RESPONSES TO PHQ9 QUESTIONS 1 & 2: 2
2. FEELING DOWN, DEPRESSED OR HOPELESS: SEVERAL DAYS
1. LITTLE INTEREST OR PLEASURE IN DOING THINGS: SEVERAL DAYS
SUM OF ALL RESPONSES TO PHQ9 QUESTIONS 1 & 2: 2

## 2024-10-16 NOTE — PROGRESS NOTES
by any of the following problems?  PHQ-9 Total Score: 2  PHQ-9 Total Score: 2  Assessment & Plan   DIAGNOSIS AND ASSESSMENT DATA     DIAGNOSIS:   1. Cyclothymia    2. Generalized anxiety disorder    3. Personality disorder (HCC)        PLAN   Follow-up:  Return in about 2 months (around 12/16/2024), or if symptoms worsen or fail to improve, for follow-up and medication management.    Prescriptions for this encounter:  New Prescriptions    No medications on file       Orders Placed This Encounter   Medications    lamoTRIgine (LAMICTAL) 25 MG tablet     Sig: Take 2 tablets by mouth daily     Dispense:  60 tablet     Refill:  2    escitalopram (LEXAPRO) 10 MG tablet     Sig: Take 1 tablet by mouth daily     Dispense:  30 tablet     Refill:  2       Medications Discontinued During This Encounter   Medication Reason    escitalopram (LEXAPRO) 10 MG tablet REORDER    lamoTRIgine (LAMICTAL) 25 MG tablet REORDER     Additional orders:  No orders of the defined types were placed in this encounter.    -patient is reporting she is still anxious but doing better   -endorses continued significant situational stressors - working on managing these better  -treatment options reviewed; will continue Lamictal as she is responding well to this medication and she will continue with her other current medications without changes  -supportive therapy provided  -Patient is encouraged to utilize nonpharmacologic coping skills such as deep breathing, guided imagery, guided meditation, muscle relaxation, calming music, and/or journaling.     Risks, potential side effects, possibledrug-drug interactions, benefits and alternate treatments discussed in detail. All questions answered. Patient stated understanding and is agreeable to treatment plan.     Patient has been instructed to seek emergency help via the emergency and/or calling 911 should symptoms become severe, worsen, or with other concerning symptoms. Patient instructed to goimmediately

## 2024-12-13 ENCOUNTER — TELEMEDICINE (OUTPATIENT)
Dept: PSYCHIATRY | Age: 44
End: 2024-12-13
Payer: COMMERCIAL

## 2024-12-13 DIAGNOSIS — F60.9 PERSONALITY DISORDER (HCC): ICD-10-CM

## 2024-12-13 DIAGNOSIS — F34.0 CYCLOTHYMIA: ICD-10-CM

## 2024-12-13 DIAGNOSIS — F41.1 GENERALIZED ANXIETY DISORDER: ICD-10-CM

## 2024-12-13 DIAGNOSIS — F43.21 GRIEF: Primary | ICD-10-CM

## 2024-12-13 PROCEDURE — G8427 DOCREV CUR MEDS BY ELIG CLIN: HCPCS | Performed by: NURSE PRACTITIONER

## 2024-12-13 PROCEDURE — G8484 FLU IMMUNIZE NO ADMIN: HCPCS | Performed by: NURSE PRACTITIONER

## 2024-12-13 PROCEDURE — 99214 OFFICE O/P EST MOD 30 MIN: CPT | Performed by: NURSE PRACTITIONER

## 2024-12-13 PROCEDURE — 90833 PSYTX W PT W E/M 30 MIN: CPT | Performed by: NURSE PRACTITIONER

## 2024-12-13 PROCEDURE — G8420 CALC BMI NORM PARAMETERS: HCPCS | Performed by: NURSE PRACTITIONER

## 2024-12-13 PROCEDURE — 1036F TOBACCO NON-USER: CPT | Performed by: NURSE PRACTITIONER

## 2024-12-13 NOTE — PROGRESS NOTES
possibledrug-drug interactions, benefits and alternate treatments discussed in detail. All questions answered. Patient stated understanding and is agreeable to treatment plan.     Patient has been instructed to seek emergency help via the emergency and/or calling 911 should symptoms become severe, worsen, or with other concerning symptoms. Patient instructed to goimmediately to the emergency room and/or call 911 with any suicidal or homicidal ideations or if audio/visual hallucinations develop  Patient stated understanding and agrees.    Patient given crisis center information.    I spent a total of 20 minutes with the patient in counseling and coordination of care regarding topics discussed above. The patient was engaged and responsive throughout session. Utilized CBT, MI and reflective listening to address topics above. The remainder of session spent on symptom evaluation and medication management.      Provider Signature:  Electronically signed by PELON Calvert CNP  **This report has been created using voice recognition software. It may contain minor errors which are inherent in voice recognition technology.**

## 2024-12-16 ENCOUNTER — OFFICE VISIT (OUTPATIENT)
Age: 44
End: 2024-12-16
Payer: COMMERCIAL

## 2024-12-16 VITALS
WEIGHT: 139 LBS | OXYGEN SATURATION: 98 % | DIASTOLIC BLOOD PRESSURE: 58 MMHG | HEIGHT: 61 IN | BODY MASS INDEX: 26.24 KG/M2 | SYSTOLIC BLOOD PRESSURE: 92 MMHG | HEART RATE: 71 BPM

## 2024-12-16 DIAGNOSIS — Z51.81 MEDICATION MONITORING ENCOUNTER: ICD-10-CM

## 2024-12-16 DIAGNOSIS — M54.50 CHRONIC MIDLINE LOW BACK PAIN WITHOUT SCIATICA: ICD-10-CM

## 2024-12-16 DIAGNOSIS — R76.8 SS-A ANTIBODY POSITIVE: ICD-10-CM

## 2024-12-16 DIAGNOSIS — R74.8 LOW SERUM ALKALINE PHOSPHATASE: ICD-10-CM

## 2024-12-16 DIAGNOSIS — G89.29 CHRONIC MIDLINE LOW BACK PAIN WITHOUT SCIATICA: ICD-10-CM

## 2024-12-16 DIAGNOSIS — M65.4 DE QUERVAIN'S DISEASE (RADIAL STYLOID TENOSYNOVITIS): ICD-10-CM

## 2024-12-16 DIAGNOSIS — M70.62 TROCHANTERIC BURSITIS OF LEFT HIP: ICD-10-CM

## 2024-12-16 DIAGNOSIS — K12.1 ORAL ULCERATION: ICD-10-CM

## 2024-12-16 DIAGNOSIS — M35.9 SYSTEMIC INVOLVEMENT OF CONNECTIVE TISSUE, UNSPECIFIED (HCC): Primary | ICD-10-CM

## 2024-12-16 PROCEDURE — 99213 OFFICE O/P EST LOW 20 MIN: CPT | Performed by: INTERNAL MEDICINE

## 2024-12-16 ASSESSMENT — ENCOUNTER SYMPTOMS
RESPIRATORY NEGATIVE: 1
GASTROINTESTINAL NEGATIVE: 1
EYES NEGATIVE: 1

## 2024-12-16 NOTE — PROGRESS NOTES
current drug use. Drug: Marijuana (Weed). She reports that she does not drink alcohol.    No Known Allergies    Current Outpatient Medications   Medication Instructions    busPIRone (BUSPAR) 30 mg, Oral, 2 TIMES DAILY    diclofenac sodium (VOLTAREN) 2 g, Topical, 2 TIMES DAILY, To shoulder    escitalopram (LEXAPRO) 10 mg, Oral, DAILY    ESTROGENS CONJUGATED VA Vaginal    folic acid (FOLVITE) 1,000 mcg, Oral, DAILY    hydrocortisone 2.5 % ointment Apply topically 2 times daily.    hydroxychloroquine (PLAQUENIL) 300 mg, Oral, DAILY    hydrOXYzine pamoate (VISTARIL) 25 mg, Oral, 3 TIMES DAILY PRN    lamoTRIgine (LAMICTAL) 50 mg, Oral, DAILY    lidocaine (LMX) 4 % cream Apply3 times daily to shoudler topically as needed.    Menaquinone-7 (K2 PO) Take by mouth    methotrexate Sodium 15 mg, SubCUTAneous, WEEKLY    NONFORMULARY Thyroid Replace of T3/T4 compound via [facility]    ondansetron (ZOFRAN-ODT) 4 mg, Oral, 3 TIMES DAILY PRN    PROGESTERONE VA Vaginal    Tuberculin-Allergy Syringes 28G X 1/2\" 1 ML MISC 1 each, Does not apply, WEEKLY       Objective     Ht 1.549 m (5' 0.98\")   Wt 63 kg (139 lb)   BMI 26.28 kg/m²     Physical Exam  Vitals reviewed.   Constitutional:       Appearance: She is well-developed.   HENT:      Mouth/Throat:      Comments: Oral sore inner lip   Cardiovascular:      Rate and Rhythm: Normal rate and regular rhythm.   Pulmonary:      Effort: Pulmonary effort is normal.      Breath sounds: Normal breath sounds.   Musculoskeletal:      Cervical back: Normal range of motion and neck supple.   Skin:     General: Skin is warm and dry.      Findings: No rash.   Neurological:      Mental Status: She is alert and oriented to person, place, and time.   Psychiatric:         Thought Content: Thought content normal.          Upper extremities:    SHOULDERS tender right  ELBOWS nontender  WRISTS nontender, no swelling  HANDS/FINGERS  finkelstein test bilateral . Tender pip - left 5th.     Lower

## 2024-12-26 RX ORDER — ESCITALOPRAM OXALATE 10 MG/1
10 TABLET ORAL DAILY
Qty: 90 TABLET | Refills: 1 | OUTPATIENT
Start: 2024-12-26

## 2024-12-28 LAB
ALBUMIN: 4.1 G/DL
ALP BLD-CCNC: 22 U/L
ALT SERPL-CCNC: 20 U/L
ANION GAP SERPL CALCULATED.3IONS-SCNC: 11 MMOL/L
AST SERPL-CCNC: 38 U/L
BASOPHILS ABSOLUTE: 0.03 /ΜL
BASOPHILS RELATIVE PERCENT: 0.7 %
BILIRUB SERPL-MCNC: 0.5 MG/DL (ref 0.1–1.4)
BILIRUBIN, URINE: NEGATIVE
BLOOD, URINE: POSITIVE
BUN BLDV-MCNC: 24 MG/DL
C3 COMPLEMENT: 105 MG/DL (ref 90–180)
C4 COMPLEMENT: 19 MG/DL (ref 10–40)
CALCIUM SERPL-MCNC: 8.7 MG/DL
CHLORIDE BLD-SCNC: 104 MMOL/L
CLARITY, UA: ABNORMAL
CO2: 28 MMOL/L
COLOR, UA: YELLOW
CREAT SERPL-MCNC: 0.9 MG/DL
EOSINOPHILS ABSOLUTE: 0.06 /ΜL
EOSINOPHILS RELATIVE PERCENT: 1.4 %
GFR, ESTIMATED: 72
GLUCOSE BLD-MCNC: 76 MG/DL
GLUCOSE URINE: NEGATIVE
HCT VFR BLD CALC: 36.6 % (ref 36–46)
HEMOGLOBIN: 12.1 G/DL (ref 12–16)
KETONES, URINE: NEGATIVE
LEUKOCYTE ESTERASE, URINE: NEGATIVE
LYMPHOCYTES ABSOLUTE: 0.78 /ΜL
LYMPHOCYTES RELATIVE PERCENT: 18.6 %
MCH RBC QN AUTO: 31.3 PG
MCHC RBC AUTO-ENTMCNC: 33.1 G/DL
MCV RBC AUTO: 94.8 FL
MONOCYTES ABSOLUTE: 0.46 /ΜL
MONOCYTES RELATIVE PERCENT: 11 %
NEUTROPHILS ABSOLUTE: 2.85 /ΜL
NEUTROPHILS RELATIVE PERCENT: 68.1 %
NITRITE, URINE: NEGATIVE
PDW BLD-RTO: 43.1 %
PH UA: 6 (ref 4.5–8)
PLATELET # BLD: 220 K/ΜL
PMV BLD AUTO: 9.8 FL
POTASSIUM SERPL-SCNC: 3.9 MMOL/L
PROTEIN UA: NEGATIVE
RBC # BLD: 3.86 10^6/ΜL
SED RATE, AUTOMATED: 17
SODIUM BLD-SCNC: 139 MMOL/L
SPECIFIC GRAVITY UA: 1.02 (ref 1–1.03)
TOTAL PROTEIN: 6.9 G/DL (ref 6.4–8.2)
UROBILINOGEN, URINE: ABNORMAL
WBC # BLD: 4.19 10^3/ML

## 2024-12-30 DIAGNOSIS — M35.9 SYSTEMIC INVOLVEMENT OF CONNECTIVE TISSUE, UNSPECIFIED (HCC): ICD-10-CM

## 2024-12-30 RX ORDER — HYDROXYCHLOROQUINE SULFATE 200 MG/1
300 TABLET, FILM COATED ORAL DAILY
Qty: 135 TABLET | Refills: 1 | Status: SHIPPED | OUTPATIENT
Start: 2024-12-30

## 2024-12-30 RX ORDER — METHOTREXATE 25 MG/ML
15 INJECTION, SOLUTION INTRAMUSCULAR; INTRATHECAL; INTRAVENOUS; SUBCUTANEOUS WEEKLY
Qty: 4 ML | Refills: 0 | Status: SHIPPED | OUTPATIENT
Start: 2024-12-30

## 2024-12-30 NOTE — PROGRESS NOTES
1. Systemic involvement of connective tissue, unspecified (HCC)  -     hydroxychloroquine (PLAQUENIL) 200 MG tablet; Take 1.5 tablets by mouth daily, Disp-135 tablet, R-1Normal  -     methotrexate Sodium 50 MG/2ML chemo injection; Inject 0.6 mLs into the skin once a week, Disp-4 mL, R-0Normal

## 2025-01-06 ENCOUNTER — PATIENT MESSAGE (OUTPATIENT)
Age: 45
End: 2025-01-06

## 2025-01-06 DIAGNOSIS — R74.8 LOW SERUM ALKALINE PHOSPHATASE: Primary | ICD-10-CM

## 2025-01-06 NOTE — RESULT ENCOUNTER NOTE
The labs testing showed an elevated vitamin B6 , and normal complement levels.     The elevated vitamin B6 and low alkaline phosphatase level can be seen with a condition known as hypophosphatasia. Would you be willing to seen an endocrinology for additional evaluation of this condition ?     The normal complements can indicate adequate lupus control.

## 2025-01-07 NOTE — TELEPHONE ENCOUNTER
Diagnosis Orders   1. Low serum alkaline phosphatase  Bg Mtz MD, Endocrinology, [place]        --- elevated vitamin B6  --- referral to endocrinology for the evaluation of possible hypophosphatasia

## 2025-01-08 ENCOUNTER — TELEMEDICINE (OUTPATIENT)
Dept: PSYCHIATRY | Age: 45
End: 2025-01-08
Payer: COMMERCIAL

## 2025-01-08 DIAGNOSIS — F34.0 CYCLOTHYMIA: ICD-10-CM

## 2025-01-08 DIAGNOSIS — F60.9 PERSONALITY DISORDER (HCC): ICD-10-CM

## 2025-01-08 DIAGNOSIS — F43.21 GRIEF: Primary | ICD-10-CM

## 2025-01-08 DIAGNOSIS — F41.1 GENERALIZED ANXIETY DISORDER: ICD-10-CM

## 2025-01-08 PROCEDURE — 1036F TOBACCO NON-USER: CPT | Performed by: NURSE PRACTITIONER

## 2025-01-08 PROCEDURE — 90833 PSYTX W PT W E/M 30 MIN: CPT | Performed by: NURSE PRACTITIONER

## 2025-01-08 PROCEDURE — G8427 DOCREV CUR MEDS BY ELIG CLIN: HCPCS | Performed by: NURSE PRACTITIONER

## 2025-01-08 PROCEDURE — G8419 CALC BMI OUT NRM PARAM NOF/U: HCPCS | Performed by: NURSE PRACTITIONER

## 2025-01-08 PROCEDURE — 99214 OFFICE O/P EST MOD 30 MIN: CPT | Performed by: NURSE PRACTITIONER

## 2025-01-08 RX ORDER — ESCITALOPRAM OXALATE 10 MG/1
10 TABLET ORAL DAILY
Qty: 30 TABLET | Refills: 2 | Status: SHIPPED | OUTPATIENT
Start: 2025-01-08

## 2025-01-08 RX ORDER — LAMOTRIGINE 25 MG/1
50 TABLET ORAL DAILY
Qty: 60 TABLET | Refills: 2 | Status: SHIPPED | OUTPATIENT
Start: 2025-01-08

## 2025-01-08 RX ORDER — HYDROXYZINE PAMOATE 25 MG/1
25 CAPSULE ORAL 3 TIMES DAILY PRN
Qty: 90 CAPSULE | Refills: 2 | Status: SHIPPED | OUTPATIENT
Start: 2025-01-08

## 2025-01-08 ASSESSMENT — PATIENT HEALTH QUESTIONNAIRE - PHQ9
2. FEELING DOWN, DEPRESSED OR HOPELESS: SEVERAL DAYS
2. FEELING DOWN, DEPRESSED OR HOPELESS: SEVERAL DAYS
SUM OF ALL RESPONSES TO PHQ QUESTIONS 1-9: 2
1. LITTLE INTEREST OR PLEASURE IN DOING THINGS: SEVERAL DAYS
SUM OF ALL RESPONSES TO PHQ QUESTIONS 1-9: 2
SUM OF ALL RESPONSES TO PHQ QUESTIONS 1-9: 2
SUM OF ALL RESPONSES TO PHQ9 QUESTIONS 1 & 2: 2
SUM OF ALL RESPONSES TO PHQ QUESTIONS 1-9: 2
SUM OF ALL RESPONSES TO PHQ9 QUESTIONS 1 & 2: 2
1. LITTLE INTEREST OR PLEASURE IN DOING THINGS: SEVERAL DAYS

## 2025-01-08 ASSESSMENT — ANXIETY QUESTIONNAIRES
4. TROUBLE RELAXING: MORE THAN HALF THE DAYS
7. FEELING AFRAID AS IF SOMETHING AWFUL MIGHT HAPPEN: MORE THAN HALF THE DAYS
3. WORRYING TOO MUCH ABOUT DIFFERENT THINGS: MORE THAN HALF THE DAYS
5. BEING SO RESTLESS THAT IT IS HARD TO SIT STILL: MORE THAN HALF THE DAYS
IF YOU CHECKED OFF ANY PROBLEMS ON THIS QUESTIONNAIRE, HOW DIFFICULT HAVE THESE PROBLEMS MADE IT FOR YOU TO DO YOUR WORK, TAKE CARE OF THINGS AT HOME, OR GET ALONG WITH OTHER PEOPLE: SOMEWHAT DIFFICULT
2. NOT BEING ABLE TO STOP OR CONTROL WORRYING: SEVERAL DAYS
2. NOT BEING ABLE TO STOP OR CONTROL WORRYING: SEVERAL DAYS
1. FEELING NERVOUS, ANXIOUS, OR ON EDGE: SEVERAL DAYS
5. BEING SO RESTLESS THAT IT IS HARD TO SIT STILL: MORE THAN HALF THE DAYS
GAD7 TOTAL SCORE: 12
7. FEELING AFRAID AS IF SOMETHING AWFUL MIGHT HAPPEN: MORE THAN HALF THE DAYS
4. TROUBLE RELAXING: MORE THAN HALF THE DAYS
6. BECOMING EASILY ANNOYED OR IRRITABLE: MORE THAN HALF THE DAYS
6. BECOMING EASILY ANNOYED OR IRRITABLE: MORE THAN HALF THE DAYS
IF YOU CHECKED OFF ANY PROBLEMS ON THIS QUESTIONNAIRE, HOW DIFFICULT HAVE THESE PROBLEMS MADE IT FOR YOU TO DO YOUR WORK, TAKE CARE OF THINGS AT HOME, OR GET ALONG WITH OTHER PEOPLE: SOMEWHAT DIFFICULT
3. WORRYING TOO MUCH ABOUT DIFFERENT THINGS: MORE THAN HALF THE DAYS
1. FEELING NERVOUS, ANXIOUS, OR ON EDGE: SEVERAL DAYS

## 2025-01-08 NOTE — PROGRESS NOTES
[facility]  [address]  [place] [place] [address]  Dept: [phone]  Dept Fax: [phone]  Loc: [phone]    Visit Date: 2025    Kristin Zuniga, was evaluated through a synchronous (real-time) audio-video encounter. The patient (or guardian if applicable) is aware that this is a billable service, which includes applicable co-pays. This Virtual Visit was conducted with patient's (and/or legal guardian's) consent. Patient identification was verified, and a caregiver was present when appropriate.   The patient was located at Other: in her parked car in a parking lot in [place]  Provider was located at Home (Appt Dept State): [place]      SUBJECTIVE DATA     CHIEF COMPLAINT:    Chief Complaint   Patient presents with    Mental Health Problem    grief    Follow-up       History obtained from: patient    HISTORY OF PRESENT ILLNESS:    Kristin Zuniga is a 44 y.o. female who presents via virtual video visit for follow-up on complaints of depression and anxiety. Last visit was 2024.    States \"I'm okay\"  -continues to struggle with grief at times; there are times when she feels \"normal\" but the majority of the time she does not feel this way  -states she doesn't know that she has identifiable feelings; she doesn't feel any specific type of feeling  -having a difficult time this morning because she took her son to tour a school where her  son had attended  -she is still fearful of being sad because \"what if I don't come back from it.\" States she isn't avoiding sadness.   -states she is allowing her feelings     She has returned to work.    She will be moving next week.  -moving so she is closer to her place of employment    So many life transitions  -feeling overwhelmed    Lost her job, lost a relationship, her child , and she has to move all around the same time.     Her partner had booked a vacation to the beach on [place], which had been

## 2025-01-13 DIAGNOSIS — K21.9 GASTROESOPHAGEAL REFLUX DISEASE, UNSPECIFIED WHETHER ESOPHAGITIS PRESENT: ICD-10-CM

## 2025-01-13 DIAGNOSIS — F41.9 ANXIETY: ICD-10-CM

## 2025-01-13 DIAGNOSIS — E87.6 HYPOKALEMIA: Primary | ICD-10-CM

## 2025-01-13 DIAGNOSIS — E78.2 MIXED HYPERLIPIDEMIA: ICD-10-CM

## 2025-01-31 DIAGNOSIS — M35.9 SYSTEMIC INVOLVEMENT OF CONNECTIVE TISSUE, UNSPECIFIED (HCC): ICD-10-CM

## 2025-01-31 RX ORDER — METHOTREXATE 25 MG/ML
15 INJECTION, SOLUTION INTRAMUSCULAR; INTRATHECAL; INTRAVENOUS; SUBCUTANEOUS WEEKLY
Qty: 12 ML | Refills: 1 | OUTPATIENT
Start: 2025-01-31

## 2025-02-03 ENCOUNTER — PATIENT MESSAGE (OUTPATIENT)
Age: 45
End: 2025-02-03

## 2025-02-03 DIAGNOSIS — M35.9 SYSTEMIC INVOLVEMENT OF CONNECTIVE TISSUE, UNSPECIFIED (HCC): Primary | ICD-10-CM

## 2025-02-03 DIAGNOSIS — K13.79 MOUTH SORES: ICD-10-CM

## 2025-02-04 RX ORDER — PREDNISONE 10 MG/1
TABLET ORAL
Qty: 15 TABLET | Refills: 0 | Status: SHIPPED | OUTPATIENT
Start: 2025-02-04 | End: 2025-02-13

## 2025-02-04 ASSESSMENT — ANXIETY QUESTIONNAIRES
4. TROUBLE RELAXING: NEARLY EVERY DAY
7. FEELING AFRAID AS IF SOMETHING AWFUL MIGHT HAPPEN: NOT AT ALL
5. BEING SO RESTLESS THAT IT IS HARD TO SIT STILL: SEVERAL DAYS
2. NOT BEING ABLE TO STOP OR CONTROL WORRYING: SEVERAL DAYS
1. FEELING NERVOUS, ANXIOUS, OR ON EDGE: MORE THAN HALF THE DAYS
7. FEELING AFRAID AS IF SOMETHING AWFUL MIGHT HAPPEN: NOT AT ALL
4. TROUBLE RELAXING: NEARLY EVERY DAY
2. NOT BEING ABLE TO STOP OR CONTROL WORRYING: SEVERAL DAYS
3. WORRYING TOO MUCH ABOUT DIFFERENT THINGS: NOT AT ALL
6. BECOMING EASILY ANNOYED OR IRRITABLE: SEVERAL DAYS
1. FEELING NERVOUS, ANXIOUS, OR ON EDGE: MORE THAN HALF THE DAYS
6. BECOMING EASILY ANNOYED OR IRRITABLE: SEVERAL DAYS
5. BEING SO RESTLESS THAT IT IS HARD TO SIT STILL: SEVERAL DAYS
GAD7 TOTAL SCORE: 8
3. WORRYING TOO MUCH ABOUT DIFFERENT THINGS: NOT AT ALL

## 2025-02-04 ASSESSMENT — PATIENT HEALTH QUESTIONNAIRE - PHQ9
2. FEELING DOWN, DEPRESSED OR HOPELESS: SEVERAL DAYS
SUM OF ALL RESPONSES TO PHQ QUESTIONS 1-9: 2
1. LITTLE INTEREST OR PLEASURE IN DOING THINGS: SEVERAL DAYS
SUM OF ALL RESPONSES TO PHQ9 QUESTIONS 1 & 2: 2
SUM OF ALL RESPONSES TO PHQ9 QUESTIONS 1 & 2: 2
SUM OF ALL RESPONSES TO PHQ QUESTIONS 1-9: 2
2. FEELING DOWN, DEPRESSED OR HOPELESS: SEVERAL DAYS
1. LITTLE INTEREST OR PLEASURE IN DOING THINGS: SEVERAL DAYS
SUM OF ALL RESPONSES TO PHQ QUESTIONS 1-9: 2
SUM OF ALL RESPONSES TO PHQ QUESTIONS 1-9: 2

## 2025-02-04 NOTE — TELEPHONE ENCOUNTER
Diagnosis Orders   1. Systemic involvement of connective tissue, unspecified (HCC)  predniSONE (DELTASONE) 10 MG tablet      2. Mouth sores  predniSONE (DELTASONE) 10 MG tablet

## 2025-02-07 ENCOUNTER — TELEMEDICINE (OUTPATIENT)
Dept: PSYCHIATRY | Age: 45
End: 2025-02-07
Payer: COMMERCIAL

## 2025-02-07 DIAGNOSIS — F60.9 PERSONALITY DISORDER (HCC): ICD-10-CM

## 2025-02-07 DIAGNOSIS — F43.0 ACUTE REACTION TO SITUATIONAL STRESS: ICD-10-CM

## 2025-02-07 DIAGNOSIS — F34.0 CYCLOTHYMIA: Primary | ICD-10-CM

## 2025-02-07 DIAGNOSIS — F41.1 GENERALIZED ANXIETY DISORDER: ICD-10-CM

## 2025-02-07 DIAGNOSIS — F43.21 GRIEF: ICD-10-CM

## 2025-02-07 PROCEDURE — 90833 PSYTX W PT W E/M 30 MIN: CPT | Performed by: NURSE PRACTITIONER

## 2025-02-07 PROCEDURE — G8427 DOCREV CUR MEDS BY ELIG CLIN: HCPCS | Performed by: NURSE PRACTITIONER

## 2025-02-07 PROCEDURE — 99214 OFFICE O/P EST MOD 30 MIN: CPT | Performed by: NURSE PRACTITIONER

## 2025-02-07 PROCEDURE — G8419 CALC BMI OUT NRM PARAM NOF/U: HCPCS | Performed by: NURSE PRACTITIONER

## 2025-02-07 PROCEDURE — 1036F TOBACCO NON-USER: CPT | Performed by: NURSE PRACTITIONER

## 2025-02-07 RX ORDER — ESCITALOPRAM OXALATE 10 MG/1
15 TABLET ORAL DAILY
Qty: 45 TABLET | Refills: 1 | Status: SHIPPED | OUTPATIENT
Start: 2025-02-07

## 2025-02-07 NOTE — PROGRESS NOTES
[facility]  [address]  [facility] [address]  Dept: [phone]  Dept Fax: [phone]  Loc: [phone]    Visit Date: 2/7/2025    Kristin Zuniga, was evaluated through a synchronous (real-time) audio-video encounter. The patient (or guardian if applicable) is aware that this is a billable service, which includes applicable co-pays. This Virtual Visit was conducted with patient's (and/or legal guardian's) consent. Patient identification was verified, and a caregiver was present when appropriate.   The patient was located at Home: [address] [place] [id number]  Provider was located at Home (Appt Dept State): [place]      SUBJECTIVE DATA     CHIEF COMPLAINT:    Chief Complaint   Patient presents with    Mental Health Problem    Follow-up       History obtained from: patient    HISTORY OF PRESENT ILLNESS:    Kristin Zuniga is a 44 y.o. female who presents via virtual video visit for follow-up on complaints of depression and anxiety. Last visit was 01/08/2025.    States she is \"okay\"  -things have been going okay    She did move into a new home  -this is going okay  -states \"everything has to be in order\" - this used to be well controlled and not bothersome but it has also been much worse in the past. She is concerned this is \"coming back\" because things feel \"out of control\" at times. Admits that she wants to control what she can control.   -moving has been a big change because she is away from her children; only has her youngest child living with her    Feels very disconnected from others  -avoiding interacting with others  -she did attend a birthday party as well as a work party - she did have a good time but it feels weird    Having a hard time keeping up on day-to-day chores   Poor focus/concentration  Increased anxiety    Continues with counseling, which she is finding beneficial  -less impulsivity   -working on core beliefs  -states she is

## 2025-03-03 LAB
ALBUMIN: 4.4 G/DL
ALP BLD-CCNC: 25 U/L
ALT SERPL-CCNC: 26 U/L
ANION GAP SERPL CALCULATED.3IONS-SCNC: 12 MMOL/L
AST SERPL-CCNC: 46 U/L
BASOPHILS ABSOLUTE: 0.03 /ΜL
BASOPHILS RELATIVE PERCENT: 0.8 %
BILIRUB SERPL-MCNC: 0.4 MG/DL (ref 0.1–1.4)
BILIRUBIN, URINE: NEGATIVE
BLOOD, URINE: POSITIVE
BUN BLDV-MCNC: 23 MG/DL
C3 COMPLEMENT: 113 MG/DL (ref 90–180)
C4 COMPLEMENT: 20 MG/DL (ref 10–40)
CALCIUM SERPL-MCNC: 9.3 MG/DL
CHLORIDE BLD-SCNC: 103 MMOL/L
CLARITY, UA: CLEAR
CO2: 30 MMOL/L
COLOR, UA: YELLOW
CREAT SERPL-MCNC: 0.9 MG/DL
EOSINOPHILS ABSOLUTE: 0.06 /ΜL
EOSINOPHILS RELATIVE PERCENT: 1.5 %
GFR, ESTIMATED: 72
GLUCOSE BLD-MCNC: 88 MG/DL
GLUCOSE URINE: NEGATIVE
HCT VFR BLD CALC: 36.3 % (ref 36–46)
HEMOGLOBIN: 12.4 G/DL (ref 12–16)
KETONES, URINE: NEGATIVE
LEUKOCYTE ESTERASE, URINE: NEGATIVE
LYMPHOCYTES ABSOLUTE: 0.94 /ΜL
LYMPHOCYTES RELATIVE PERCENT: 24.2 %
MAGNESIUM: 2.1 MG/DL
MCH RBC QN AUTO: 32.1 PG
MCHC RBC AUTO-ENTMCNC: 34.2 G/DL
MCV RBC AUTO: 94 FL
MONOCYTES ABSOLUTE: 0.35 /ΜL
MONOCYTES RELATIVE PERCENT: 9 %
NEUTROPHILS ABSOLUTE: 2.49 /ΜL
NEUTROPHILS RELATIVE PERCENT: 64.2 %
NITRITE, URINE: NEGATIVE
PDW BLD-RTO: 42.1 %
PH UA: 6 (ref 4.5–8)
PLATELET # BLD: 261 K/ΜL
PMV BLD AUTO: 9.8 FL
POTASSIUM SERPL-SCNC: 3.8 MMOL/L
PROTEIN UA: NEGATIVE
RBC # BLD: 3.86 10^6/ΜL
SED RATE, AUTOMATED: 16
SODIUM BLD-SCNC: 140 MMOL/L
SPECIFIC GRAVITY UA: 1.02 (ref 1–1.03)
T3 FREE: 3.6
T4 FREE: 0.72
T4 FREE: 7.1
TOTAL PROTEIN: 7.7 G/DL (ref 6.4–8.2)
TSH SERPL DL<=0.05 MIU/L-ACNC: 1.83 UIU/ML
UROBILINOGEN, URINE: NORMAL
VITAMIN D 25-HYDROXY: 37.4
VITAMIN D2, 25 HYDROXY: NORMAL
VITAMIN D3,25 HYDROXY: NORMAL
WBC # BLD: 3.88 10^3/ML

## 2025-03-04 DIAGNOSIS — M35.9 SYSTEMIC INVOLVEMENT OF CONNECTIVE TISSUE, UNSPECIFIED: ICD-10-CM

## 2025-03-04 RX ORDER — METHOTREXATE 25 MG/ML
15 INJECTION, SOLUTION INTRAMUSCULAR; INTRATHECAL; INTRAVENOUS; SUBCUTANEOUS WEEKLY
Qty: 4 ML | Refills: 0 | Status: SHIPPED | OUTPATIENT
Start: 2025-03-04 | End: 2025-03-06

## 2025-03-04 RX ORDER — ESCITALOPRAM OXALATE 10 MG/1
TABLET ORAL
Qty: 135 TABLET | Refills: 1 | OUTPATIENT
Start: 2025-03-04

## 2025-03-04 RX ORDER — HYDROXYCHLOROQUINE SULFATE 200 MG/1
300 TABLET, FILM COATED ORAL DAILY
Qty: 135 TABLET | Refills: 1 | Status: SHIPPED | OUTPATIENT
Start: 2025-03-04

## 2025-03-06 ENCOUNTER — OFFICE VISIT (OUTPATIENT)
Age: 45
End: 2025-03-06
Payer: COMMERCIAL

## 2025-03-06 VITALS
WEIGHT: 139.7 LBS | BODY MASS INDEX: 26.38 KG/M2 | HEART RATE: 66 BPM | DIASTOLIC BLOOD PRESSURE: 64 MMHG | HEIGHT: 61 IN | SYSTOLIC BLOOD PRESSURE: 100 MMHG | OXYGEN SATURATION: 98 %

## 2025-03-06 DIAGNOSIS — M65.4 DE QUERVAIN'S DISEASE (RADIAL STYLOID TENOSYNOVITIS): ICD-10-CM

## 2025-03-06 DIAGNOSIS — M54.50 CHRONIC MIDLINE LOW BACK PAIN WITHOUT SCIATICA: ICD-10-CM

## 2025-03-06 DIAGNOSIS — K13.79 MOUTH SORES: ICD-10-CM

## 2025-03-06 DIAGNOSIS — K12.1 ORAL ULCERATION: ICD-10-CM

## 2025-03-06 DIAGNOSIS — Z51.81 MEDICATION MONITORING ENCOUNTER: ICD-10-CM

## 2025-03-06 DIAGNOSIS — R74.8 LOW SERUM ALKALINE PHOSPHATASE: ICD-10-CM

## 2025-03-06 DIAGNOSIS — M35.9 SYSTEMIC INVOLVEMENT OF CONNECTIVE TISSUE, UNSPECIFIED: Primary | ICD-10-CM

## 2025-03-06 DIAGNOSIS — G89.29 CHRONIC MIDLINE LOW BACK PAIN WITHOUT SCIATICA: ICD-10-CM

## 2025-03-06 PROCEDURE — 1036F TOBACCO NON-USER: CPT | Performed by: NURSE PRACTITIONER

## 2025-03-06 PROCEDURE — 99213 OFFICE O/P EST LOW 20 MIN: CPT | Performed by: NURSE PRACTITIONER

## 2025-03-06 PROCEDURE — 99214 OFFICE O/P EST MOD 30 MIN: CPT | Performed by: NURSE PRACTITIONER

## 2025-03-06 PROCEDURE — G8427 DOCREV CUR MEDS BY ELIG CLIN: HCPCS | Performed by: NURSE PRACTITIONER

## 2025-03-06 PROCEDURE — G8419 CALC BMI OUT NRM PARAM NOF/U: HCPCS | Performed by: NURSE PRACTITIONER

## 2025-03-06 RX ORDER — AZATHIOPRINE 50 MG/1
TABLET ORAL
Qty: 60 TABLET | Refills: 0 | Status: SHIPPED | OUTPATIENT
Start: 2025-03-06 | End: 2025-03-06

## 2025-03-06 RX ORDER — AZATHIOPRINE 50 MG/1
TABLET ORAL
Qty: 60 TABLET | Refills: 0 | Status: SHIPPED | OUTPATIENT
Start: 2025-03-06

## 2025-03-06 ASSESSMENT — ENCOUNTER SYMPTOMS
EYES NEGATIVE: 1
RESPIRATORY NEGATIVE: 1
GASTROINTESTINAL NEGATIVE: 1

## 2025-03-06 NOTE — PROGRESS NOTES
[facility] RHEUMATOLOGY FOLLOW UP NOTE       Date Of Service: 3/6/2025  Provider: PELON Prieto - CNP    Name: Kristin Zuniga   MRN: [id number]    Chief Complaint(s)     Chief Complaint   Patient presents with    Follow-up     3 month follow up systemic involvement of connective tissue        History of Present Illness (HPI)     Kristin Zuniga  is a(n)44 y.o. female with a hx of anemia, GERD, headaches, hypothyroidism, low back pain, mixed anxiety depressive disorder, mixed hyperlipidemia, obesity here for the f/u evaluation of undiff connective tissue disease    Interval hx:    - continued bilateral thumb pain- tried thumb/wrist splinting at night which made it worse     - back on the methotrexate    - oral sores which have worsened     pain affecting the thumbs, left hip, knees   Pain on a scale 0-10: 5/10  Type of pain: intermittent  Timing:none  Aggravating factors: shoulder: increased use. Hip: walking    Associated symptoms:  denies swelling/  Redness/ warmth, + AM stiffness lasting 30 minutes        REVIEW OF SYSTEMS: (ROS)    Review of Systems   Constitutional: Negative.    HENT:  Positive for mouth sores.    Eyes: Negative.    Respiratory: Negative.     Cardiovascular: Negative.    Gastrointestinal: Negative.    Endocrine: Negative.    Genitourinary: Negative.    Musculoskeletal:  Positive for arthralgias.   Skin: Negative.    Neurological: Negative.    Psychiatric/Behavioral: Negative.         PAST MEDICAL HISTORY      Past Medical History:   Diagnosis Date    Anemia     Gastroesophageal reflux disease 10/19/2020    Headache     Hypothyroidism     Low back pain 10/19/2020    Mixed anxiety depressive disorder 10/19/2020    Mixed hyperlipidemia 10/19/2020    Obesity        PAST SURGICAL HISTORY      Past Surgical History:   Procedure Laterality Date    CARPAL TUNNEL RELEASE Right      SECTION      COLON SURGERY      HYSTERECTOMY (CERVIX STATUS UNKNOWN)      SLEEVE GASTRECTOMY

## 2025-03-07 LAB — T3 REVERSE: 9 NG/DL (ref 8–25)

## 2025-03-11 ENCOUNTER — OFFICE VISIT (OUTPATIENT)
Dept: NEPHROLOGY | Age: 45
End: 2025-03-11
Payer: COMMERCIAL

## 2025-03-11 VITALS
DIASTOLIC BLOOD PRESSURE: 50 MMHG | WEIGHT: 139 LBS | BODY MASS INDEX: 26.28 KG/M2 | SYSTOLIC BLOOD PRESSURE: 104 MMHG | HEART RATE: 66 BPM | OXYGEN SATURATION: 98 %

## 2025-03-11 DIAGNOSIS — E87.6 HYPOKALEMIA: Primary | ICD-10-CM

## 2025-03-11 PROCEDURE — G8427 DOCREV CUR MEDS BY ELIG CLIN: HCPCS | Performed by: INTERNAL MEDICINE

## 2025-03-11 PROCEDURE — 1036F TOBACCO NON-USER: CPT | Performed by: INTERNAL MEDICINE

## 2025-03-11 PROCEDURE — G8419 CALC BMI OUT NRM PARAM NOF/U: HCPCS | Performed by: INTERNAL MEDICINE

## 2025-03-11 PROCEDURE — 99214 OFFICE O/P EST MOD 30 MIN: CPT | Performed by: INTERNAL MEDICINE

## 2025-03-11 NOTE — PROGRESS NOTES
[facility] PHYSICIANS [place] [facility]  [facility]  [address]  [address]  [facility] [address]  Dept: [phone]  Loc: [phone]  Progress Note  3/11/2025 9:32 AM      Pt Name:    Kristin Zuniga  YOB: 1980  Primary Care Physician:  Christ Griffin, PELON - CNP     Chief Complaint:   Chief Complaint   Patient presents with    Follow-up     Hypokalemia         History of Present Illness:   This is a follow-up visit for hypokalemia that started after her gastric sleeve surgery.     Patient following with Dr. Salas for Undifferentiated connective tissue disorder vs SLE.   Recently started azathioprine. Has been having mouth sores.  Has had a lot of stress due to the death of her son a few months ago.          Pertinent items are noted in HPI.         Past History:  Past Medical History:   Diagnosis Date    Anemia     Gastroesophageal reflux disease 10/19/2020    Headache     Hypothyroidism     Low back pain 10/19/2020    Mixed anxiety depressive disorder 10/19/2020    Mixed hyperlipidemia 10/19/2020    Obesity      Past Surgical History:   Procedure Laterality Date    CARPAL TUNNEL RELEASE Right      SECTION      COLON SURGERY      HYSTERECTOMY (CERVIX STATUS UNKNOWN)      SLEEVE GASTRECTOMY          VITALS:  BP (!) 104/50 (BP Site: Right Upper Arm, Patient Position: Sitting, BP Cuff Size: Large Adult)   Pulse 66   Wt 63 kg (139 lb)   SpO2 98%   BMI 26.28 kg/m²   Wt Readings from Last 3 Encounters:   25 63 kg (139 lb)   25 63.4 kg (139 lb 11.2 oz)   24 63 kg (139 lb)     Body mass index is 26.28 kg/m².     General Appearance: alert and cooperative with exam, appears comfortable, no distress  HEENT: EOMI, moist oral mucus membranes  Neck: No jugular venous distention,   Lungs: Air entry B/L, no crackles or rales, no use of accessory muscles  Heart: S1, S2 heard, no rub  GI: soft, non-tender, no guarding,   Extremities: No sig LE edema,

## 2025-04-01 RX ORDER — AZATHIOPRINE 50 MG/1
TABLET ORAL
Qty: 180 TABLET | Refills: 1 | OUTPATIENT
Start: 2025-04-01

## 2025-04-02 ENCOUNTER — TELEMEDICINE (OUTPATIENT)
Dept: PSYCHIATRY | Age: 45
End: 2025-04-02
Payer: COMMERCIAL

## 2025-04-02 DIAGNOSIS — F34.0 CYCLOTHYMIA: Primary | ICD-10-CM

## 2025-04-02 DIAGNOSIS — F43.21 GRIEF: ICD-10-CM

## 2025-04-02 DIAGNOSIS — F41.1 GENERALIZED ANXIETY DISORDER: ICD-10-CM

## 2025-04-02 PROCEDURE — 1036F TOBACCO NON-USER: CPT | Performed by: NURSE PRACTITIONER

## 2025-04-02 PROCEDURE — 90833 PSYTX W PT W E/M 30 MIN: CPT | Performed by: NURSE PRACTITIONER

## 2025-04-02 PROCEDURE — G8419 CALC BMI OUT NRM PARAM NOF/U: HCPCS | Performed by: NURSE PRACTITIONER

## 2025-04-02 PROCEDURE — 99214 OFFICE O/P EST MOD 30 MIN: CPT | Performed by: NURSE PRACTITIONER

## 2025-04-02 PROCEDURE — G8427 DOCREV CUR MEDS BY ELIG CLIN: HCPCS | Performed by: NURSE PRACTITIONER

## 2025-04-02 RX ORDER — LAMOTRIGINE 25 MG/1
50 TABLET ORAL DAILY
Qty: 60 TABLET | Refills: 2 | Status: SHIPPED | OUTPATIENT
Start: 2025-04-02

## 2025-04-02 RX ORDER — BUSPIRONE HYDROCHLORIDE 15 MG/1
15 TABLET ORAL 2 TIMES DAILY
Qty: 60 TABLET | Refills: 1 | Status: SHIPPED | OUTPATIENT
Start: 2025-04-02

## 2025-04-02 RX ORDER — ESCITALOPRAM OXALATE 10 MG/1
15 TABLET ORAL DAILY
Qty: 45 TABLET | Refills: 2 | Status: SHIPPED | OUTPATIENT
Start: 2025-04-02

## 2025-04-02 ASSESSMENT — ANXIETY QUESTIONNAIRES
3. WORRYING TOO MUCH ABOUT DIFFERENT THINGS: SEVERAL DAYS
2. NOT BEING ABLE TO STOP OR CONTROL WORRYING: SEVERAL DAYS
6. BECOMING EASILY ANNOYED OR IRRITABLE: SEVERAL DAYS
4. TROUBLE RELAXING: MORE THAN HALF THE DAYS
1. FEELING NERVOUS, ANXIOUS, OR ON EDGE: SEVERAL DAYS
5. BEING SO RESTLESS THAT IT IS HARD TO SIT STILL: SEVERAL DAYS
GAD7 TOTAL SCORE: 7
1. FEELING NERVOUS, ANXIOUS, OR ON EDGE: SEVERAL DAYS
3. WORRYING TOO MUCH ABOUT DIFFERENT THINGS: SEVERAL DAYS
6. BECOMING EASILY ANNOYED OR IRRITABLE: SEVERAL DAYS
2. NOT BEING ABLE TO STOP OR CONTROL WORRYING: SEVERAL DAYS
7. FEELING AFRAID AS IF SOMETHING AWFUL MIGHT HAPPEN: NOT AT ALL
7. FEELING AFRAID AS IF SOMETHING AWFUL MIGHT HAPPEN: NOT AT ALL
4. TROUBLE RELAXING: MORE THAN HALF THE DAYS
5. BEING SO RESTLESS THAT IT IS HARD TO SIT STILL: SEVERAL DAYS

## 2025-04-02 ASSESSMENT — PATIENT HEALTH QUESTIONNAIRE - PHQ9
6. FEELING BAD ABOUT YOURSELF - OR THAT YOU ARE A FAILURE OR HAVE LET YOURSELF OR YOUR FAMILY DOWN: NOT AT ALL
4. FEELING TIRED OR HAVING LITTLE ENERGY: SEVERAL DAYS
8. MOVING OR SPEAKING SO SLOWLY THAT OTHER PEOPLE COULD HAVE NOTICED. OR THE OPPOSITE - BEING SO FIDGETY OR RESTLESS THAT YOU HAVE BEEN MOVING AROUND A LOT MORE THAN USUAL: NOT AT ALL
9. THOUGHTS THAT YOU WOULD BE BETTER OFF DEAD, OR OF HURTING YOURSELF: NOT AT ALL
2. FEELING DOWN, DEPRESSED OR HOPELESS: SEVERAL DAYS
4. FEELING TIRED OR HAVING LITTLE ENERGY: SEVERAL DAYS
7. TROUBLE CONCENTRATING ON THINGS, SUCH AS READING THE NEWSPAPER OR WATCHING TELEVISION: SEVERAL DAYS
1. LITTLE INTEREST OR PLEASURE IN DOING THINGS: NOT AT ALL
8. MOVING OR SPEAKING SO SLOWLY THAT OTHER PEOPLE COULD HAVE NOTICED. OR THE OPPOSITE, BEING SO FIGETY OR RESTLESS THAT YOU HAVE BEEN MOVING AROUND A LOT MORE THAN USUAL: NOT AT ALL
SUM OF ALL RESPONSES TO PHQ QUESTIONS 1-9: 4
6. FEELING BAD ABOUT YOURSELF - OR THAT YOU ARE A FAILURE OR HAVE LET YOURSELF OR YOUR FAMILY DOWN: NOT AT ALL
SUM OF ALL RESPONSES TO PHQ QUESTIONS 1-9: 4
2. FEELING DOWN, DEPRESSED OR HOPELESS: SEVERAL DAYS
5. POOR APPETITE OR OVEREATING: NOT AT ALL
10. IF YOU CHECKED OFF ANY PROBLEMS, HOW DIFFICULT HAVE THESE PROBLEMS MADE IT FOR YOU TO DO YOUR WORK, TAKE CARE OF THINGS AT HOME, OR GET ALONG WITH OTHER PEOPLE: SOMEWHAT DIFFICULT
SUM OF ALL RESPONSES TO PHQ QUESTIONS 1-9: 4
7. TROUBLE CONCENTRATING ON THINGS, SUCH AS READING THE NEWSPAPER OR WATCHING TELEVISION: SEVERAL DAYS
SUM OF ALL RESPONSES TO PHQ QUESTIONS 1-9: 4
5. POOR APPETITE OR OVEREATING: NOT AT ALL
3. TROUBLE FALLING OR STAYING ASLEEP: SEVERAL DAYS
9. THOUGHTS THAT YOU WOULD BE BETTER OFF DEAD, OR OF HURTING YOURSELF: NOT AT ALL
3. TROUBLE FALLING OR STAYING ASLEEP: SEVERAL DAYS
SUM OF ALL RESPONSES TO PHQ QUESTIONS 1-9: 4
1. LITTLE INTEREST OR PLEASURE IN DOING THINGS: NOT AT ALL
10. IF YOU CHECKED OFF ANY PROBLEMS, HOW DIFFICULT HAVE THESE PROBLEMS MADE IT FOR YOU TO DO YOUR WORK, TAKE CARE OF THINGS AT HOME, OR GET ALONG WITH OTHER PEOPLE: SOMEWHAT DIFFICULT

## 2025-04-02 NOTE — PROGRESS NOTES
Total Score 7  8  12    How difficult have these problems made it for you to do your work, take care of things at home, or get along with other people?   Somewhat difficult       Patient-reported         PHQ-2 Over the past 2 weeks, how often have you been bothered by any of the following problems?  Little interest or pleasure in doing things: (Patient-Rptd) Not at all  Feeling down, depressed, or hopeless: (Patient-Rptd) Several days  PHQ-2 Score: (Patient-Rptd) 1  PHQ-9 Over the past 2 weeks, how often have you been bothered by any of the following problems?  Trouble falling or staying asleep, or sleeping too much: (Patient-Rptd) Several days  Feeling tired or having little energy: (Patient-Rptd) Several days  Poor appetite or overeating: (Patient-Rptd) Not at all  Feeling bad about yourself - or that you are a failure or have let yourself or your family down: (Patient-Rptd) Not at all  Trouble concentrating on things, such as reading the newspaper or watching television: (Patient-Rptd) Several days  Moving or speaking so slowly that other people could have noticed. Or the opposite - being so fidgety or restless that you have been moving around a lot more than usual: (Patient-Rptd) Not at all  Thoughts that you would be better off dead, or of hurting yourself in some way: (Patient-Rptd) Not at all  If you checked off any problems, how difficult have these problems made it for you to do your work, take care of things at home, or get along with other people?: (Patient-Rptd) Somewhat difficult  PHQ-9 Total Score: (Patient-Rptd) 4  PHQ-9 Total Score: (Patient-Rptd) 4  Assessment & Plan   DIAGNOSIS AND ASSESSMENT DATA     DIAGNOSIS:   1. Cyclothymia    2. Generalized anxiety disorder    3. Grief      PLAN   Follow-up:  Return in about 4 weeks (around 4/30/2025), or if symptoms worsen or fail to improve, for follow-up and medication management.    Prescriptions for this encounter:  New Prescriptions    BUSPIRONE

## 2025-04-28 RX ORDER — BUSPIRONE HYDROCHLORIDE 15 MG/1
15 TABLET ORAL 2 TIMES DAILY
Qty: 180 TABLET | Refills: 1 | OUTPATIENT
Start: 2025-04-28

## 2025-04-30 LAB
C-REACTIVE PROTEIN: 1
SED RATE, AUTOMATED: 46

## 2025-05-02 ENCOUNTER — TELEMEDICINE (OUTPATIENT)
Dept: PSYCHIATRY | Age: 45
End: 2025-05-02
Payer: COMMERCIAL

## 2025-05-02 DIAGNOSIS — F41.1 GENERALIZED ANXIETY DISORDER: ICD-10-CM

## 2025-05-02 DIAGNOSIS — F43.0 ACUTE REACTION TO SITUATIONAL STRESS: ICD-10-CM

## 2025-05-02 DIAGNOSIS — F43.21 GRIEF: ICD-10-CM

## 2025-05-02 DIAGNOSIS — F60.9 PERSONALITY DISORDER (HCC): ICD-10-CM

## 2025-05-02 DIAGNOSIS — F34.0 CYCLOTHYMIA: Primary | ICD-10-CM

## 2025-05-02 PROCEDURE — 99214 OFFICE O/P EST MOD 30 MIN: CPT | Performed by: NURSE PRACTITIONER

## 2025-05-02 PROCEDURE — 90836 PSYTX W PT W E/M 45 MIN: CPT | Performed by: NURSE PRACTITIONER

## 2025-05-02 PROCEDURE — 1036F TOBACCO NON-USER: CPT | Performed by: NURSE PRACTITIONER

## 2025-05-02 PROCEDURE — G8419 CALC BMI OUT NRM PARAM NOF/U: HCPCS | Performed by: NURSE PRACTITIONER

## 2025-05-02 PROCEDURE — G8427 DOCREV CUR MEDS BY ELIG CLIN: HCPCS | Performed by: NURSE PRACTITIONER

## 2025-05-02 RX ORDER — AZATHIOPRINE 50 MG/1
TABLET ORAL
Qty: 60 TABLET | Refills: 0 | OUTPATIENT
Start: 2025-05-02

## 2025-05-02 ASSESSMENT — PATIENT HEALTH QUESTIONNAIRE - PHQ9
SUM OF ALL RESPONSES TO PHQ QUESTIONS 1-9: 7
SUM OF ALL RESPONSES TO PHQ QUESTIONS 1-9: 7
5. POOR APPETITE OR OVEREATING: SEVERAL DAYS
5. POOR APPETITE OR OVEREATING: SEVERAL DAYS
4. FEELING TIRED OR HAVING LITTLE ENERGY: MORE THAN HALF THE DAYS
6. FEELING BAD ABOUT YOURSELF - OR THAT YOU ARE A FAILURE OR HAVE LET YOURSELF OR YOUR FAMILY DOWN: NOT AT ALL
2. FEELING DOWN, DEPRESSED OR HOPELESS: SEVERAL DAYS
1. LITTLE INTEREST OR PLEASURE IN DOING THINGS: SEVERAL DAYS
4. FEELING TIRED OR HAVING LITTLE ENERGY: MORE THAN HALF THE DAYS
SUM OF ALL RESPONSES TO PHQ QUESTIONS 1-9: 7
8. MOVING OR SPEAKING SO SLOWLY THAT OTHER PEOPLE COULD HAVE NOTICED. OR THE OPPOSITE - BEING SO FIDGETY OR RESTLESS THAT YOU HAVE BEEN MOVING AROUND A LOT MORE THAN USUAL: NOT AT ALL
9. THOUGHTS THAT YOU WOULD BE BETTER OFF DEAD, OR OF HURTING YOURSELF: NOT AT ALL
6. FEELING BAD ABOUT YOURSELF - OR THAT YOU ARE A FAILURE OR HAVE LET YOURSELF OR YOUR FAMILY DOWN: NOT AT ALL
3. TROUBLE FALLING OR STAYING ASLEEP: SEVERAL DAYS
7. TROUBLE CONCENTRATING ON THINGS, SUCH AS READING THE NEWSPAPER OR WATCHING TELEVISION: SEVERAL DAYS
SUM OF ALL RESPONSES TO PHQ QUESTIONS 1-9: 7
9. THOUGHTS THAT YOU WOULD BE BETTER OFF DEAD, OR OF HURTING YOURSELF: NOT AT ALL
10. IF YOU CHECKED OFF ANY PROBLEMS, HOW DIFFICULT HAVE THESE PROBLEMS MADE IT FOR YOU TO DO YOUR WORK, TAKE CARE OF THINGS AT HOME, OR GET ALONG WITH OTHER PEOPLE: SOMEWHAT DIFFICULT
8. MOVING OR SPEAKING SO SLOWLY THAT OTHER PEOPLE COULD HAVE NOTICED. OR THE OPPOSITE, BEING SO FIGETY OR RESTLESS THAT YOU HAVE BEEN MOVING AROUND A LOT MORE THAN USUAL: NOT AT ALL
7. TROUBLE CONCENTRATING ON THINGS, SUCH AS READING THE NEWSPAPER OR WATCHING TELEVISION: SEVERAL DAYS
10. IF YOU CHECKED OFF ANY PROBLEMS, HOW DIFFICULT HAVE THESE PROBLEMS MADE IT FOR YOU TO DO YOUR WORK, TAKE CARE OF THINGS AT HOME, OR GET ALONG WITH OTHER PEOPLE: SOMEWHAT DIFFICULT
3. TROUBLE FALLING OR STAYING ASLEEP: SEVERAL DAYS
2. FEELING DOWN, DEPRESSED OR HOPELESS: SEVERAL DAYS
1. LITTLE INTEREST OR PLEASURE IN DOING THINGS: SEVERAL DAYS
SUM OF ALL RESPONSES TO PHQ QUESTIONS 1-9: 7

## 2025-05-02 ASSESSMENT — ANXIETY QUESTIONNAIRES
5. BEING SO RESTLESS THAT IT IS HARD TO SIT STILL: SEVERAL DAYS
7. FEELING AFRAID AS IF SOMETHING AWFUL MIGHT HAPPEN: SEVERAL DAYS
2. NOT BEING ABLE TO STOP OR CONTROL WORRYING: SEVERAL DAYS
6. BECOMING EASILY ANNOYED OR IRRITABLE: NOT AT ALL
3. WORRYING TOO MUCH ABOUT DIFFERENT THINGS: NOT AT ALL
3. WORRYING TOO MUCH ABOUT DIFFERENT THINGS: NOT AT ALL
2. NOT BEING ABLE TO STOP OR CONTROL WORRYING: SEVERAL DAYS
4. TROUBLE RELAXING: MORE THAN HALF THE DAYS
IF YOU CHECKED OFF ANY PROBLEMS ON THIS QUESTIONNAIRE, HOW DIFFICULT HAVE THESE PROBLEMS MADE IT FOR YOU TO DO YOUR WORK, TAKE CARE OF THINGS AT HOME, OR GET ALONG WITH OTHER PEOPLE: SOMEWHAT DIFFICULT
7. FEELING AFRAID AS IF SOMETHING AWFUL MIGHT HAPPEN: SEVERAL DAYS
4. TROUBLE RELAXING: MORE THAN HALF THE DAYS
1. FEELING NERVOUS, ANXIOUS, OR ON EDGE: MORE THAN HALF THE DAYS
1. FEELING NERVOUS, ANXIOUS, OR ON EDGE: MORE THAN HALF THE DAYS
5. BEING SO RESTLESS THAT IT IS HARD TO SIT STILL: SEVERAL DAYS
6. BECOMING EASILY ANNOYED OR IRRITABLE: NOT AT ALL
IF YOU CHECKED OFF ANY PROBLEMS ON THIS QUESTIONNAIRE, HOW DIFFICULT HAVE THESE PROBLEMS MADE IT FOR YOU TO DO YOUR WORK, TAKE CARE OF THINGS AT HOME, OR GET ALONG WITH OTHER PEOPLE: SOMEWHAT DIFFICULT
GAD7 TOTAL SCORE: 7

## 2025-05-02 NOTE — PROGRESS NOTES
[facility]  [address]  [facility] [address]  Dept: [phone]  Dept Fax: [phone]  Loc: [phone]    Visit Date: 5/2/2025    Kristin Zuniga, was evaluated through a synchronous (real-time) audio-video encounter. The patient (or guardian if applicable) is aware that this is a billable service, which includes applicable co-pays. This Virtual Visit was conducted with patient's (and/or legal guardian's) consent. Patient identification was verified, and a caregiver was present when appropriate.   The patient was located at Home: [address] [place] [id number]  Provider was located at Home (Appt Dept State): [place]      SUBJECTIVE DATA     CHIEF COMPLAINT:    Chief Complaint   Patient presents with    Mental Health Problem    Follow-up       History obtained from: patient    HISTORY OF PRESENT ILLNESS:    Kristin Zuniga is a 44 y.o. female who presents via virtual video visit for follow-up on complaints of depression and anxiety. Last visit was 04/02/2025.    Patient states \"I'm okay.\"  -continues living with grief \"and that's terrible.\"  -having some difficulty getting up out of bed  -states \"I'm tired\"  -states she is \"pretty numb\"   -had a really sad moment on Wednesday and then yesterday she felt more on edge (not irritable but that she could cry easily)  -still having significant episodes of fatigue    Not sleeping well at night  -has no difficulty initiating sleep but doesn't maintain sleep well    Has excessive worry about the well-being of her children    She attended approximately 5 sessions of a grief support group    She has recently seen a  as everything from her form job has \"come back up again.\"  -she asked if they can wait until after her son's graduation  -states she is very anxious about this situation  -going to the  \"stirred a bunch of stuff up.\"   -states she feels like she should have been

## 2025-05-05 ENCOUNTER — OFFICE VISIT (OUTPATIENT)
Age: 45
End: 2025-05-05
Payer: COMMERCIAL

## 2025-05-05 VITALS
BODY MASS INDEX: 26.58 KG/M2 | WEIGHT: 140.8 LBS | HEART RATE: 63 BPM | DIASTOLIC BLOOD PRESSURE: 64 MMHG | SYSTOLIC BLOOD PRESSURE: 102 MMHG | HEIGHT: 61 IN

## 2025-05-05 DIAGNOSIS — R23.2 HOT FLASHES: ICD-10-CM

## 2025-05-05 DIAGNOSIS — E83.39 HYPOPHOSPHATASIA: Primary | ICD-10-CM

## 2025-05-05 DIAGNOSIS — E03.9 ACQUIRED HYPOTHYROIDISM: ICD-10-CM

## 2025-05-05 PROCEDURE — 99204 OFFICE O/P NEW MOD 45 MIN: CPT | Performed by: INTERNAL MEDICINE

## 2025-05-05 PROCEDURE — G8419 CALC BMI OUT NRM PARAM NOF/U: HCPCS | Performed by: INTERNAL MEDICINE

## 2025-05-05 PROCEDURE — 1036F TOBACCO NON-USER: CPT | Performed by: INTERNAL MEDICINE

## 2025-05-05 PROCEDURE — G8427 DOCREV CUR MEDS BY ELIG CLIN: HCPCS | Performed by: INTERNAL MEDICINE

## 2025-05-05 RX ORDER — THYROID 60 MG/1
60 TABLET ORAL DAILY
COMMUNITY
Start: 2025-05-05

## 2025-05-05 NOTE — PROGRESS NOTES
[facility] PHYSICIANS [place] [facility]  [facility] ENDOCRINOLOGY  [address]  [address]  [facility] [address]  Dept: [phone]  Loc: [phone]     Visit Date: 5/5/2025  The patient (or guardian, if applicable) and other individuals in attendance with the patient were advised that Artificial Intelligence will be utilized during this visit to record, process the conversation to generate a clinical note, and support improvement of the AI technology. The patient (or guardian, if applicable) and other individuals in attendance at the appointment consented to the use of AI, including the recording.      Kristin Zuniga is a 44 y.o. 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    She has been under the care of Dr. Salas for approximately 15 years due to mixed connective tissue disease. Significant joint pain is reported, initially localized to the hip but now extending to the hands, accompanied by swelling and stiffness. Despite engaging in heavy exercise, muscle soreness and weakness are noted, although there is an increase in muscle mass. Over the past few months, increased body soreness is reported without any changes in the exercise routine. No history of fractures or dental issues is reported. There is no history of kidney stones or family history of hypophosphatasia. An episode of unexplained foot swelling a few years ago is recalled, but no recurrence is noted. Additionally, no lower extremity edema or fractures of small bones in the feet or hands are reported.  Hypothyroidism was diagnosed approximately 15 years ago. She has been on a T3/T4 combination therapy for the past 8 to 9 months, previously on levothyroxine 125 mcg until 8

## 2025-05-05 NOTE — PATIENT INSTRUCTIONS
Check 24 hour urine ethanolamine, vitamin B6, phosphorus levels now  Check ft4, ft3, tsh, TPO and thyroglobulin levels in 1 month  Return in 1 month

## 2025-05-07 ENCOUNTER — RESULTS FOLLOW-UP (OUTPATIENT)
Age: 45
End: 2025-05-07

## 2025-05-07 DIAGNOSIS — M35.9 SYSTEMIC INVOLVEMENT OF CONNECTIVE TISSUE, UNSPECIFIED: Primary | ICD-10-CM

## 2025-05-07 RX ORDER — AZATHIOPRINE 50 MG/1
100 TABLET ORAL DAILY
Qty: 60 TABLET | Refills: 0 | Status: SHIPPED | OUTPATIENT
Start: 2025-05-07 | End: 2025-06-04 | Stop reason: SDUPTHER

## 2025-05-22 DIAGNOSIS — E03.9 ACQUIRED HYPOTHYROIDISM: ICD-10-CM

## 2025-05-23 LAB
A/G RATIO: 1.7 RATIO (ref 0.8–2.6)
ALBUMIN: 4.3 G/DL (ref 3.5–5.2)
ALP BLD-CCNC: 20 U/L (ref 23–144)
ALT SERPL-CCNC: 18 U/L (ref 0–60)
AST SERPL-CCNC: 24 U/L (ref 0–55)
BILIRUB SERPL-MCNC: 0.4 MG/DL (ref 0–1.2)
BILIRUBIN DIRECT: 0.1 MG/DL (ref 0–0.4)
BILIRUBIN, INDIRECT: 0.3 MG/DL (ref 0–1.2)
ESTRADIOL, SENSITIVE: <5 PG/ML
FOLLICLE STIMULATING HORMONE: 134 MIU/ML
GLOBULIN: 2.6 G/DL (ref 1.9–3.6)
LUTEINIZING HORMONE: 79.6 MIU/ML
PHOSPHORUS: 4 MG/DL (ref 2.5–4.5)
T3 FREE: 2.5 PG/ML (ref 2.3–4.2)
T4 FREE: 0.79 NG/DL (ref 0.8–1.8)
TOTAL PROTEIN: 6.9 G/DL (ref 6–8.3)
TSH ULTRASENSITIVE: 2.11 MCIU/ML (ref 0.4–4.5)

## 2025-05-23 RX ORDER — THYROID 60 MG/1
60 TABLET ORAL DAILY
Qty: 30 TABLET | Refills: 5 | Status: SHIPPED | OUTPATIENT
Start: 2025-05-23

## 2025-05-28 LAB
THYROGLOBULIN AB: 0.13
THYROID PEROXIDASE ANTIBODY: 10 EIA

## 2025-05-30 LAB — PYRIDOXAL PHOS SERPL-MCNC: 217.2 NG/ML (ref 2.1–21.7)

## 2025-05-31 ENCOUNTER — RESULTS FOLLOW-UP (OUTPATIENT)
Age: 45
End: 2025-05-31

## 2025-05-31 NOTE — RESULT ENCOUNTER NOTE
Kristin Zuniga  lab test(s) were abnormal.  REMIND PATIENT TO COMPLETE URINE TESTING.  Please contact patient and document response.

## 2025-06-02 NOTE — TELEPHONE ENCOUNTER
----- Message from Dr. Bg Munson MD sent at 5/31/2025  1:35 PM EDT -----  Kristin Zuniga  lab test(s) were abnormal.  REMIND PATIENT TO COMPLETE URINE TESTING.  Please contact patient and document response.

## 2025-06-03 DIAGNOSIS — M35.9 SYSTEMIC INVOLVEMENT OF CONNECTIVE TISSUE, UNSPECIFIED: ICD-10-CM

## 2025-06-03 RX ORDER — AZATHIOPRINE 50 MG/1
100 TABLET ORAL DAILY
Qty: 60 TABLET | Refills: 0 | OUTPATIENT
Start: 2025-06-03

## 2025-06-04 ENCOUNTER — OFFICE VISIT (OUTPATIENT)
Age: 45
End: 2025-06-04
Payer: COMMERCIAL

## 2025-06-04 VITALS
OXYGEN SATURATION: 100 % | DIASTOLIC BLOOD PRESSURE: 68 MMHG | BODY MASS INDEX: 26.94 KG/M2 | HEIGHT: 61 IN | HEART RATE: 71 BPM | SYSTOLIC BLOOD PRESSURE: 112 MMHG | WEIGHT: 142.7 LBS

## 2025-06-04 DIAGNOSIS — K13.79 MOUTH SORES: ICD-10-CM

## 2025-06-04 DIAGNOSIS — M54.50 CHRONIC MIDLINE LOW BACK PAIN WITHOUT SCIATICA: ICD-10-CM

## 2025-06-04 DIAGNOSIS — G89.29 CHRONIC MIDLINE LOW BACK PAIN WITHOUT SCIATICA: ICD-10-CM

## 2025-06-04 DIAGNOSIS — M65.4 DE QUERVAIN'S DISEASE (RADIAL STYLOID TENOSYNOVITIS): ICD-10-CM

## 2025-06-04 DIAGNOSIS — K12.1 ORAL ULCERATION: ICD-10-CM

## 2025-06-04 DIAGNOSIS — M35.9 SYSTEMIC INVOLVEMENT OF CONNECTIVE TISSUE, UNSPECIFIED: Primary | ICD-10-CM

## 2025-06-04 DIAGNOSIS — Z51.81 MEDICATION MONITORING ENCOUNTER: ICD-10-CM

## 2025-06-04 DIAGNOSIS — R76.8 SS-A ANTIBODY POSITIVE: ICD-10-CM

## 2025-06-04 DIAGNOSIS — R74.8 LOW SERUM ALKALINE PHOSPHATASE: ICD-10-CM

## 2025-06-04 PROCEDURE — 1036F TOBACCO NON-USER: CPT | Performed by: NURSE PRACTITIONER

## 2025-06-04 PROCEDURE — G8419 CALC BMI OUT NRM PARAM NOF/U: HCPCS | Performed by: NURSE PRACTITIONER

## 2025-06-04 PROCEDURE — 99214 OFFICE O/P EST MOD 30 MIN: CPT | Performed by: NURSE PRACTITIONER

## 2025-06-04 PROCEDURE — G8427 DOCREV CUR MEDS BY ELIG CLIN: HCPCS | Performed by: NURSE PRACTITIONER

## 2025-06-04 PROCEDURE — 99213 OFFICE O/P EST LOW 20 MIN: CPT | Performed by: NURSE PRACTITIONER

## 2025-06-04 RX ORDER — PREDNISONE 5 MG/1
TABLET ORAL
Qty: 40 TABLET | Refills: 0 | Status: SHIPPED | OUTPATIENT
Start: 2025-06-04 | End: 2025-06-20

## 2025-06-04 RX ORDER — AZATHIOPRINE 50 MG/1
150 TABLET ORAL DAILY
Qty: 90 TABLET | Refills: 0 | Status: SHIPPED | OUTPATIENT
Start: 2025-06-04

## 2025-06-04 ASSESSMENT — ENCOUNTER SYMPTOMS
GASTROINTESTINAL NEGATIVE: 1
RESPIRATORY NEGATIVE: 1
EYES NEGATIVE: 1

## 2025-06-13 LAB
ALBUMIN: 4.7 G/DL
ALP BLD-CCNC: 27 U/L
ALT SERPL-CCNC: 19 U/L
ANION GAP SERPL CALCULATED.3IONS-SCNC: 8 MMOL/L
AST SERPL-CCNC: 34 U/L
BASOPHILS ABSOLUTE: ABNORMAL
BASOPHILS RELATIVE PERCENT: ABNORMAL
BILIRUB SERPL-MCNC: 0.5 MG/DL (ref 0.1–1.4)
BUN BLDV-MCNC: 23 MG/DL
C-REACTIVE PROTEIN: <5
CALCIUM SERPL-MCNC: 9.5 MG/DL
CHLORIDE BLD-SCNC: 104 MMOL/L
CO2: 27 MMOL/L
CREAT SERPL-MCNC: 0.9 MG/DL
EOSINOPHILS ABSOLUTE: ABNORMAL
EOSINOPHILS RELATIVE PERCENT: ABNORMAL
FOLLICLE STIMULATING HORMONE: 65.6
GFR, ESTIMATED: 80
GLUCOSE BLD-MCNC: 98 MG/DL
HCT VFR BLD CALC: 33.8 % (ref 36–46)
HEMOGLOBIN: 11.4 G/DL (ref 12–16)
LUTEINIZING HORMONE: 74
LYMPHOCYTES ABSOLUTE: ABNORMAL
LYMPHOCYTES RELATIVE PERCENT: ABNORMAL
MCH RBC QN AUTO: ABNORMAL PG
MCHC RBC AUTO-ENTMCNC: ABNORMAL G/DL
MCV RBC AUTO: ABNORMAL FL
MONOCYTES ABSOLUTE: ABNORMAL
MONOCYTES RELATIVE PERCENT: ABNORMAL
NEUTROPHILS ABSOLUTE: ABNORMAL
NEUTROPHILS RELATIVE PERCENT: ABNORMAL
PHOSPHORUS: 4.5 MG/DL
PLATELET # BLD: 252 K/ΜL
PMV BLD AUTO: ABNORMAL FL
POTASSIUM SERPL-SCNC: 4.3 MMOL/L
RBC # BLD: ABNORMAL 10*6/UL
SED RATE, AUTOMATED: 26
SODIUM BLD-SCNC: 139 MMOL/L
T3 FREE: 3.34
T4 FREE: 0.69
TOTAL PROTEIN: 7.9 G/DL (ref 6.4–8.2)
TSH SERPL DL<=0.05 MIU/L-ACNC: 0.42 UIU/ML
WBC # BLD: 7.6 10^3/ML

## 2025-06-14 LAB
ESTRADIOL, SENSITIVE: 33 PG/ML
LUTEINIZING HORMONE: 74 MIU/ML

## 2025-06-16 LAB
THYROGLOBULIN AB: 0.2
THYROID PEROXIDASE ANTIBODY: 15 EIA

## 2025-06-17 ENCOUNTER — TELEPHONE (OUTPATIENT)
Age: 45
End: 2025-06-17

## 2025-06-17 LAB — CREATINE 24 HOUR URINE: 1.2

## 2025-06-17 NOTE — TELEPHONE ENCOUNTER
Patient states she has completed all labs that  has ordered and would like a phone call to discuss results. Please advise, labs done 06/13/2025,06/14/2025.    **patient can be reached at [phone].

## 2025-06-22 ENCOUNTER — RESULTS FOLLOW-UP (OUTPATIENT)
Age: 45
End: 2025-06-22

## 2025-06-27 DIAGNOSIS — M35.9 SYSTEMIC INVOLVEMENT OF CONNECTIVE TISSUE, UNSPECIFIED: ICD-10-CM

## 2025-06-30 RX ORDER — AZATHIOPRINE 50 MG/1
150 TABLET ORAL DAILY
Qty: 270 TABLET | Refills: 1 | Status: SHIPPED | OUTPATIENT
Start: 2025-06-30

## 2025-06-30 NOTE — TELEPHONE ENCOUNTER
Labs for Sed, CBC, CMP, and CRP from [facility] on 6/13/25.   DOLV: 6/4/25  DONV: 7/8/25  LAST LAB DRAW: 6/13/25  LAST TB TEST: n/a    Lab Results   Component Value Date     06/13/2025    K 4.3 06/13/2025     06/13/2025    CO2 27 06/13/2025    BUN 23 (H) 06/13/2025    CREATININE 0.90 06/13/2025    GLUCOSE 98 06/13/2025    CALCIUM 9.5 06/13/2025    BILITOT 0.5 06/13/2025    ALKPHOS 27 (L) 06/13/2025    AST 34 06/13/2025    ALT 19 06/13/2025    LABGLOM 80 06/13/2025    AGRATIO 1.7 05/23/2025    GLOB 2.6 05/23/2025       Recent Labs     06/13/25  0753   WBC 7.6   HGB 11.4*   HCT 33.8*          Lab Results   Component Value Date    SEDRATE 26 (H) 06/13/2025       Lab Results   Component Value Date    CRP <5.0 06/13/2025

## 2025-07-02 LAB
Lab: NORMAL
PERFORMING LAB: NORMAL
RESULT: NORMAL
TEST NAME: NORMAL

## 2025-07-02 RX ORDER — ESCITALOPRAM OXALATE 10 MG/1
TABLET ORAL
Qty: 45 TABLET | Refills: 2 | OUTPATIENT
Start: 2025-07-02

## 2025-07-06 ENCOUNTER — RESULTS FOLLOW-UP (OUTPATIENT)
Age: 45
End: 2025-07-06

## 2025-07-06 NOTE — RESULT ENCOUNTER NOTE
Kristin Zuniga  lab test(s) were abnormal.  I have given this patient referral to [facility] for genetic testing.  Please make sure this referral has been made.  Please contact patient and document response.

## 2025-07-07 RX ORDER — ESCITALOPRAM OXALATE 10 MG/1
TABLET ORAL
Qty: 45 TABLET | Refills: 2 | OUTPATIENT
Start: 2025-07-07

## 2025-07-08 ENCOUNTER — OFFICE VISIT (OUTPATIENT)
Age: 45
End: 2025-07-08
Payer: COMMERCIAL

## 2025-07-08 VITALS
HEIGHT: 61 IN | WEIGHT: 144.8 LBS | BODY MASS INDEX: 27.34 KG/M2 | OXYGEN SATURATION: 98 % | SYSTOLIC BLOOD PRESSURE: 108 MMHG | HEART RATE: 45 BPM | DIASTOLIC BLOOD PRESSURE: 70 MMHG

## 2025-07-08 DIAGNOSIS — K13.79 MOUTH SORES: ICD-10-CM

## 2025-07-08 DIAGNOSIS — Z51.81 MEDICATION MONITORING ENCOUNTER: ICD-10-CM

## 2025-07-08 DIAGNOSIS — M54.50 CHRONIC MIDLINE LOW BACK PAIN WITHOUT SCIATICA: ICD-10-CM

## 2025-07-08 DIAGNOSIS — G89.29 CHRONIC MIDLINE LOW BACK PAIN WITHOUT SCIATICA: ICD-10-CM

## 2025-07-08 DIAGNOSIS — M35.9 SYSTEMIC INVOLVEMENT OF CONNECTIVE TISSUE, UNSPECIFIED: Primary | ICD-10-CM

## 2025-07-08 DIAGNOSIS — M65.4 DE QUERVAIN'S DISEASE (RADIAL STYLOID TENOSYNOVITIS): ICD-10-CM

## 2025-07-08 DIAGNOSIS — K12.1 ORAL ULCERATION: ICD-10-CM

## 2025-07-08 PROCEDURE — 99214 OFFICE O/P EST MOD 30 MIN: CPT | Performed by: INTERNAL MEDICINE

## 2025-07-08 PROCEDURE — 1036F TOBACCO NON-USER: CPT | Performed by: INTERNAL MEDICINE

## 2025-07-08 PROCEDURE — 99213 OFFICE O/P EST LOW 20 MIN: CPT | Performed by: INTERNAL MEDICINE

## 2025-07-08 PROCEDURE — G8419 CALC BMI OUT NRM PARAM NOF/U: HCPCS | Performed by: INTERNAL MEDICINE

## 2025-07-08 PROCEDURE — G8427 DOCREV CUR MEDS BY ELIG CLIN: HCPCS | Performed by: INTERNAL MEDICINE

## 2025-07-08 RX ORDER — MULTIVIT-MIN/IRON FUM/FOLIC AC 19 MG-400
1 TABLET ORAL DAILY
COMMUNITY

## 2025-07-08 RX ORDER — AZATHIOPRINE 50 MG/1
100 TABLET ORAL DAILY
Qty: 60 TABLET | Refills: 1 | Status: SHIPPED | OUTPATIENT
Start: 2025-07-08

## 2025-07-08 RX ORDER — LEFLUNOMIDE 10 MG/1
10 TABLET ORAL DAILY
Qty: 30 TABLET | Refills: 0 | Status: SHIPPED | OUTPATIENT
Start: 2025-07-08

## 2025-07-08 ASSESSMENT — ENCOUNTER SYMPTOMS
GASTROINTESTINAL NEGATIVE: 1
EYES NEGATIVE: 1
RESPIRATORY NEGATIVE: 1

## 2025-07-08 NOTE — PROGRESS NOTES
she has never smoked. She has never used smokeless tobacco. She reports current drug use. Drug: Marijuana (Weed). She reports that she does not drink alcohol.    No Known Allergies    Current Outpatient Medications   Medication Instructions    azaTHIOprine (IMURAN) 150 mg, Oral, DAILY    busPIRone (BUSPAR) 15 mg, Oral, 2 times daily    escitalopram (LEXAPRO) 15 mg, Oral, DAILY    hydroxychloroquine (PLAQUENIL) 300 mg, Oral, DAILY    hydrOXYzine pamoate (VISTARIL) 25 mg, Oral, 3 TIMES DAILY PRN    lamoTRIgine (LAMICTAL) 50 mg, Oral, DAILY    Magic Mouthwash (MIRACLE MOUTHWASH) Shake Well; For Oral Use. Lidocaine Viscous 2%; 80mL, Diphenhydramine 12.5MG/5Ml; 80mL, ALUM & MAG HYDROXIDE-SIMETH 200-200-20 MG/5ML; 80mL.    NONFORMULARY Thyroid Replace of T3/T4 compound via [facility]    ondansetron (ZOFRAN-ODT) 4 mg, Oral, 3 TIMES DAILY PRN    thyroid (ARMOUR THYROID) 60 mg, Oral, DAILY       Objective     There were no vitals taken for this visit.    Physical Exam  Vitals reviewed.   Constitutional:       Appearance: Normal appearance. She is well-developed.   HENT:      Head: Normocephalic.      Nose: Nose normal.      Mouth/Throat:      Comments: Oral sore below the tongue  Eyes:      Pupils: Pupils are equal, round, and reactive to light.   Cardiovascular:      Rate and Rhythm: Normal rate and regular rhythm.      Heart sounds: No murmur heard.  Pulmonary:      Effort: Pulmonary effort is normal.      Breath sounds: Normal breath sounds.   Musculoskeletal:         General: Tenderness present. No swelling.      Cervical back: Normal range of motion and neck supple.   Skin:     General: Skin is warm and dry.      Findings: No rash.   Neurological:      Mental Status: She is alert and oriented to person, place, and time.   Psychiatric:         Thought Content: Thought content normal.          Upper extremities:    Shoulder, elbows, wrists - Non-tender.   HANDS/FINGERS - tender pip - left 3-4. + finkelstein testing

## 2025-07-17 NOTE — TELEPHONE ENCOUNTER
If willing to give it a try, please try the hydroxyzine again. Perhaps ask the pharmacy if they can do a partial fill. If unable to tolerate please call back and provide an update. There are some other options, including BuSpar.
Message received via [employer]:    Radha Restrepo  To   [facility] Clinical Support Pool  Sent   12/9/2020  2:27 PM    Afternoon! Thank you for seeing me today! After processing things, I realized that I have had vistaril in the past and didn't do well w it because it made me extremely tired and unable to keep up w every day duties. Perhaps that's changed and I'm willing to give it a try but I wanted to know if there are any other options for the anxiety. I did well w buspar but I'm not sure your take on it or if it would be effective. You're the expert so I'll leave that to you. Let me know when you get a chance.    Thanks,   [employer]
Responded to patient via gifteet
stable

## 2025-08-05 DIAGNOSIS — M35.9 SYSTEMIC INVOLVEMENT OF CONNECTIVE TISSUE, UNSPECIFIED: ICD-10-CM

## 2025-08-05 RX ORDER — LEFLUNOMIDE 10 MG/1
10 TABLET ORAL DAILY
Qty: 90 TABLET | Refills: 1 | OUTPATIENT
Start: 2025-08-05

## 2025-08-06 ENCOUNTER — OFFICE VISIT (OUTPATIENT)
Age: 45
End: 2025-08-06
Payer: COMMERCIAL

## 2025-08-06 VITALS
BODY MASS INDEX: 27.98 KG/M2 | SYSTOLIC BLOOD PRESSURE: 88 MMHG | DIASTOLIC BLOOD PRESSURE: 60 MMHG | HEART RATE: 64 BPM | HEIGHT: 61 IN | WEIGHT: 148.2 LBS

## 2025-08-06 DIAGNOSIS — E83.39 ALKALINE PHOSPHATASE DEFICIENCY: ICD-10-CM

## 2025-08-06 DIAGNOSIS — E03.9 ACQUIRED HYPOTHYROIDISM: Primary | ICD-10-CM

## 2025-08-06 PROCEDURE — G8419 CALC BMI OUT NRM PARAM NOF/U: HCPCS | Performed by: INTERNAL MEDICINE

## 2025-08-06 PROCEDURE — 1036F TOBACCO NON-USER: CPT | Performed by: INTERNAL MEDICINE

## 2025-08-06 PROCEDURE — 99214 OFFICE O/P EST MOD 30 MIN: CPT | Performed by: INTERNAL MEDICINE

## 2025-08-06 PROCEDURE — G8427 DOCREV CUR MEDS BY ELIG CLIN: HCPCS | Performed by: INTERNAL MEDICINE

## 2025-08-06 RX ORDER — THYROID 60 MG/1
60 TABLET ORAL DAILY
Qty: 90 TABLET | Refills: 3 | Status: SHIPPED | OUTPATIENT
Start: 2025-08-06

## 2025-08-06 ASSESSMENT — ENCOUNTER SYMPTOMS
GASTROINTESTINAL NEGATIVE: 1
RESPIRATORY NEGATIVE: 1

## 2025-08-11 ENCOUNTER — TELEMEDICINE (OUTPATIENT)
Dept: PSYCHIATRY | Age: 45
End: 2025-08-11
Payer: COMMERCIAL

## 2025-08-11 DIAGNOSIS — F34.0 CYCLOTHYMIA: Primary | ICD-10-CM

## 2025-08-11 DIAGNOSIS — F43.21 GRIEF: ICD-10-CM

## 2025-08-11 DIAGNOSIS — F41.1 GENERALIZED ANXIETY DISORDER: ICD-10-CM

## 2025-08-11 PROCEDURE — 99214 OFFICE O/P EST MOD 30 MIN: CPT | Performed by: NURSE PRACTITIONER

## 2025-08-11 PROCEDURE — G8427 DOCREV CUR MEDS BY ELIG CLIN: HCPCS | Performed by: NURSE PRACTITIONER

## 2025-08-11 PROCEDURE — 1036F TOBACCO NON-USER: CPT | Performed by: NURSE PRACTITIONER

## 2025-08-11 PROCEDURE — G8419 CALC BMI OUT NRM PARAM NOF/U: HCPCS | Performed by: NURSE PRACTITIONER

## 2025-08-11 RX ORDER — LAMOTRIGINE 25 MG/1
50 TABLET ORAL DAILY
Qty: 60 TABLET | Refills: 2 | Status: SHIPPED | OUTPATIENT
Start: 2025-08-11

## 2025-08-11 RX ORDER — ESCITALOPRAM OXALATE 20 MG/1
20 TABLET ORAL DAILY
Qty: 30 TABLET | Refills: 2 | Status: SHIPPED | OUTPATIENT
Start: 2025-08-11

## 2025-08-11 RX ORDER — LEVOMEFOLATE CALCIUM 15 MG
15 TABLET ORAL DAILY
Qty: 30 TABLET | Refills: 2 | Status: SHIPPED | OUTPATIENT
Start: 2025-08-11

## 2025-08-28 ENCOUNTER — HOSPITAL ENCOUNTER (OUTPATIENT)
Dept: GENERAL RADIOLOGY | Age: 45
Discharge: HOME | End: 2025-08-28

## 2025-08-28 DIAGNOSIS — Z00.6 EXAMINATION FOR NORMAL COMPARISON FOR CLINICAL RESEARCH: ICD-10-CM

## 2025-08-28 DIAGNOSIS — M54.50 CHRONIC MIDLINE LOW BACK PAIN WITHOUT SCIATICA: ICD-10-CM

## 2025-08-28 DIAGNOSIS — G89.29 CHRONIC MIDLINE LOW BACK PAIN WITHOUT SCIATICA: ICD-10-CM

## 2025-09-03 ENCOUNTER — PATIENT MESSAGE (OUTPATIENT)
Age: 45
End: 2025-09-03

## 2025-09-03 DIAGNOSIS — M35.9 SYSTEMIC INVOLVEMENT OF CONNECTIVE TISSUE, UNSPECIFIED: ICD-10-CM

## 2025-09-03 DIAGNOSIS — K13.79 MOUTH SORES: Primary | ICD-10-CM

## 2025-09-04 RX ORDER — PREDNISONE 5 MG/1
TABLET ORAL
Qty: 40 TABLET | Refills: 0 | Status: SHIPPED | OUTPATIENT
Start: 2025-09-04 | End: 2025-09-20